# Patient Record
Sex: FEMALE | Race: WHITE | NOT HISPANIC OR LATINO | Employment: UNEMPLOYED | URBAN - NONMETROPOLITAN AREA
[De-identification: names, ages, dates, MRNs, and addresses within clinical notes are randomized per-mention and may not be internally consistent; named-entity substitution may affect disease eponyms.]

---

## 2017-02-14 ENCOUNTER — APPOINTMENT (OUTPATIENT)
Dept: LAB | Facility: HOSPITAL | Age: 42
End: 2017-02-14

## 2017-02-14 ENCOUNTER — TRANSCRIBE ORDERS (OUTPATIENT)
Dept: ADMINISTRATIVE | Facility: HOSPITAL | Age: 42
End: 2017-02-14

## 2017-02-14 DIAGNOSIS — E78.1 PURE HYPERGLYCERIDEMIA: ICD-10-CM

## 2017-02-14 DIAGNOSIS — E05.00 TOXIC DIFFUSE GOITER WITHOUT MENTION OF THYROTOXIC CRISIS OR STORM: Primary | ICD-10-CM

## 2017-02-14 DIAGNOSIS — R53.81 MALAISE AND FATIGUE: ICD-10-CM

## 2017-02-14 DIAGNOSIS — R53.83 MALAISE AND FATIGUE: ICD-10-CM

## 2017-02-14 DIAGNOSIS — E05.00 GRAVES DISEASE: ICD-10-CM

## 2017-02-14 LAB
ARTICHOKE IGE QN: 156 MG/DL (ref 0–99)
CHOLEST SERPL-MCNC: 226 MG/DL (ref 130–200)
HDLC SERPL-MCNC: 39 MG/DL
LDLC/HDLC SERPL: 4.21 {RATIO}
T3FREE SERPL-MCNC: 3.62 PG/ML (ref 2.77–5.27)
T4 FREE SERPL-MCNC: 1.18 NG/DL (ref 0.78–2.19)
TRIGL SERPL-MCNC: 114 MG/DL (ref 0–149)
TSH SERPL DL<=0.05 MIU/L-ACNC: 33.2 MIU/ML (ref 0.47–4.68)

## 2017-02-14 PROCEDURE — 84439 ASSAY OF FREE THYROXINE: CPT | Performed by: NURSE PRACTITIONER

## 2017-02-14 PROCEDURE — 36415 COLL VENOUS BLD VENIPUNCTURE: CPT | Performed by: NURSE PRACTITIONER

## 2017-02-14 PROCEDURE — 84481 FREE ASSAY (FT-3): CPT | Performed by: NURSE PRACTITIONER

## 2017-02-14 PROCEDURE — 80061 LIPID PANEL: CPT | Performed by: NURSE PRACTITIONER

## 2017-02-14 PROCEDURE — 84443 ASSAY THYROID STIM HORMONE: CPT | Performed by: NURSE PRACTITIONER

## 2017-03-14 ENCOUNTER — TRANSCRIBE ORDERS (OUTPATIENT)
Dept: ADMINISTRATIVE | Facility: HOSPITAL | Age: 42
End: 2017-03-14

## 2017-03-14 ENCOUNTER — APPOINTMENT (OUTPATIENT)
Dept: LAB | Facility: HOSPITAL | Age: 42
End: 2017-03-14

## 2017-03-14 DIAGNOSIS — R70.0 ELEVATED ERYTHROCYTE SEDIMENTATION RATE: ICD-10-CM

## 2017-03-14 DIAGNOSIS — R79.82 ELEVATED C-REACTIVE PROTEIN (CRP): Primary | ICD-10-CM

## 2017-03-14 LAB
CRP SERPL-MCNC: 1.05 MG/DL (ref 0–0.99)
ERYTHROCYTE [SEDIMENTATION RATE] IN BLOOD: 10 MM/HR (ref 0–20)

## 2017-03-14 PROCEDURE — 85651 RBC SED RATE NONAUTOMATED: CPT | Performed by: PSYCHIATRY & NEUROLOGY

## 2017-03-14 PROCEDURE — 86140 C-REACTIVE PROTEIN: CPT | Performed by: PSYCHIATRY & NEUROLOGY

## 2017-03-14 PROCEDURE — 36415 COLL VENOUS BLD VENIPUNCTURE: CPT | Performed by: PSYCHIATRY & NEUROLOGY

## 2017-04-06 ENCOUNTER — TRANSCRIBE ORDERS (OUTPATIENT)
Dept: ADMINISTRATIVE | Facility: HOSPITAL | Age: 42
End: 2017-04-06

## 2017-04-06 ENCOUNTER — HOSPITAL ENCOUNTER (OUTPATIENT)
Dept: GENERAL RADIOLOGY | Facility: HOSPITAL | Age: 42
Discharge: HOME | End: 2017-04-06
Admitting: NURSE PRACTITIONER

## 2017-04-06 DIAGNOSIS — M54.2 NECK PAIN: ICD-10-CM

## 2017-04-06 DIAGNOSIS — M54.5 LOW BACK PAIN, UNSPECIFIED BACK PAIN LATERALITY, UNSPECIFIED CHRONICITY, WITH SCIATICA PRESENCE UNSPECIFIED: ICD-10-CM

## 2017-04-06 DIAGNOSIS — M25.561 RIGHT KNEE PAIN, UNSPECIFIED CHRONICITY: ICD-10-CM

## 2017-04-06 DIAGNOSIS — M54.2 NECK PAIN: Primary | ICD-10-CM

## 2017-04-06 PROCEDURE — 73564 X-RAY EXAM KNEE 4 OR MORE: CPT

## 2017-04-06 PROCEDURE — 72050 X-RAY EXAM NECK SPINE 4/5VWS: CPT

## 2017-04-06 PROCEDURE — 72110 X-RAY EXAM L-2 SPINE 4/>VWS: CPT

## 2017-04-24 ENCOUNTER — TRANSCRIBE ORDERS (OUTPATIENT)
Dept: ADMINISTRATIVE | Facility: HOSPITAL | Age: 42
End: 2017-04-24

## 2017-04-24 ENCOUNTER — APPOINTMENT (OUTPATIENT)
Dept: LAB | Facility: HOSPITAL | Age: 42
End: 2017-04-24

## 2017-04-24 DIAGNOSIS — E55.9 UNSPECIFIED VITAMIN D DEFICIENCY: ICD-10-CM

## 2017-04-24 DIAGNOSIS — E78.1 HIGH TRIGLYCERIDES: Primary | ICD-10-CM

## 2017-04-24 DIAGNOSIS — E03.9 UNSPECIFIED HYPOTHYROIDISM: ICD-10-CM

## 2017-04-24 LAB
ALBUMIN SERPL-MCNC: 3.3 G/DL (ref 3.5–5)
ALBUMIN/GLOB SERPL: 1 G/DL (ref 1.1–2.5)
ALP SERPL-CCNC: 52 U/L (ref 24–120)
ALT SERPL W P-5'-P-CCNC: 24 U/L (ref 0–54)
ANION GAP SERPL CALCULATED.3IONS-SCNC: 8 MMOL/L (ref 4–13)
ARTICHOKE IGE QN: 96 MG/DL (ref 0–99)
AST SERPL-CCNC: 23 U/L (ref 7–45)
BASOPHILS # BLD AUTO: 0.03 10*3/MM3 (ref 0–0.2)
BASOPHILS NFR BLD AUTO: 0.3 % (ref 0–2)
BILIRUB SERPL-MCNC: 0.3 MG/DL (ref 0.1–1)
BUN BLD-MCNC: 17 MG/DL (ref 5–21)
BUN/CREAT SERPL: 21.3 (ref 7–25)
CALCIUM SPEC-SCNC: 8.5 MG/DL (ref 8.4–10.4)
CHLORIDE SERPL-SCNC: 102 MMOL/L (ref 98–110)
CHOLEST SERPL-MCNC: 158 MG/DL (ref 130–200)
CO2 SERPL-SCNC: 27 MMOL/L (ref 24–31)
CREAT BLD-MCNC: 0.8 MG/DL (ref 0.5–1.4)
DEPRECATED RDW RBC AUTO: 42.4 FL (ref 40–54)
EOSINOPHIL # BLD AUTO: 0.15 10*3/MM3 (ref 0–0.7)
EOSINOPHIL NFR BLD AUTO: 1.3 % (ref 0–4)
ERYTHROCYTE [DISTWIDTH] IN BLOOD BY AUTOMATED COUNT: 13.2 % (ref 12–15)
GFR SERPL CREATININE-BSD FRML MDRD: 79 ML/MIN/1.73
GLOBULIN UR ELPH-MCNC: 3.3 GM/DL
GLUCOSE BLD-MCNC: 87 MG/DL (ref 70–100)
HCT VFR BLD AUTO: 37.7 % (ref 37–47)
HDLC SERPL-MCNC: 38 MG/DL
HGB BLD-MCNC: 12.3 G/DL (ref 12–16)
IMM GRANULOCYTES # BLD: 0.02 10*3/MM3 (ref 0–0.03)
IMM GRANULOCYTES NFR BLD: 0.2 % (ref 0–5)
LDLC/HDLC SERPL: 2.19 {RATIO}
LYMPHOCYTES # BLD AUTO: 5.17 10*3/MM3 (ref 0.72–4.86)
LYMPHOCYTES NFR BLD AUTO: 45.9 % (ref 15–45)
MAGNESIUM SERPL-MCNC: 1.7 MG/DL (ref 1.4–2.2)
MCH RBC QN AUTO: 28.7 PG (ref 28–32)
MCHC RBC AUTO-ENTMCNC: 32.6 G/DL (ref 33–36)
MCV RBC AUTO: 87.9 FL (ref 82–98)
MONOCYTES # BLD AUTO: 1.19 10*3/MM3 (ref 0.19–1.3)
MONOCYTES NFR BLD AUTO: 10.6 % (ref 4–12)
NEUTROPHILS # BLD AUTO: 4.71 10*3/MM3 (ref 1.87–8.4)
NEUTROPHILS NFR BLD AUTO: 41.7 % (ref 39–78)
PLATELET # BLD AUTO: 212 10*3/MM3 (ref 130–400)
PMV BLD AUTO: 11 FL (ref 6–12)
POTASSIUM BLD-SCNC: 3.8 MMOL/L (ref 3.5–5.3)
PROT SERPL-MCNC: 6.6 G/DL (ref 6.3–8.7)
RBC # BLD AUTO: 4.29 10*6/MM3 (ref 4.2–5.4)
SODIUM BLD-SCNC: 137 MMOL/L (ref 135–145)
T3FREE SERPL-MCNC: 4.17 PG/ML (ref 2.77–5.27)
T4 FREE SERPL-MCNC: 2.54 NG/DL (ref 0.78–2.19)
TRIGL SERPL-MCNC: 184 MG/DL (ref 0–149)
TSH SERPL DL<=0.05 MIU/L-ACNC: 0.62 MIU/ML (ref 0.47–4.68)
WBC NRBC COR # BLD: 11.27 10*3/MM3 (ref 4.8–10.8)

## 2017-04-24 PROCEDURE — 83735 ASSAY OF MAGNESIUM: CPT | Performed by: NURSE PRACTITIONER

## 2017-04-24 PROCEDURE — 84439 ASSAY OF FREE THYROXINE: CPT | Performed by: NURSE PRACTITIONER

## 2017-04-24 PROCEDURE — 80050 GENERAL HEALTH PANEL: CPT | Performed by: NURSE PRACTITIONER

## 2017-04-24 PROCEDURE — 80061 LIPID PANEL: CPT | Performed by: NURSE PRACTITIONER

## 2017-04-24 PROCEDURE — 84481 FREE ASSAY (FT-3): CPT | Performed by: NURSE PRACTITIONER

## 2017-04-24 PROCEDURE — 36415 COLL VENOUS BLD VENIPUNCTURE: CPT | Performed by: NURSE PRACTITIONER

## 2017-06-27 ENCOUNTER — APPOINTMENT (OUTPATIENT)
Dept: LAB | Facility: HOSPITAL | Age: 42
End: 2017-06-27

## 2017-06-27 ENCOUNTER — TRANSCRIBE ORDERS (OUTPATIENT)
Dept: LAB | Facility: HOSPITAL | Age: 42
End: 2017-06-27

## 2017-06-27 DIAGNOSIS — E55.9 VITAMIN D DEFICIENCY, UNSPECIFIED: ICD-10-CM

## 2017-06-27 DIAGNOSIS — E03.9 UNSPECIFIED HYPOTHYROIDISM: Primary | ICD-10-CM

## 2017-06-27 DIAGNOSIS — E78.1 PURE HYPERGLYCERIDEMIA: ICD-10-CM

## 2017-06-27 LAB
ALBUMIN SERPL-MCNC: 4.2 G/DL (ref 3.5–5)
ALBUMIN/GLOB SERPL: 1.3 G/DL (ref 1.1–2.5)
ALP SERPL-CCNC: 70 U/L (ref 24–120)
ALT SERPL W P-5'-P-CCNC: 34 U/L (ref 0–54)
ANION GAP SERPL CALCULATED.3IONS-SCNC: 10 MMOL/L (ref 4–13)
ARTICHOKE IGE QN: 103 MG/DL (ref 0–99)
AST SERPL-CCNC: 18 U/L (ref 7–45)
BASOPHILS # BLD AUTO: 0.04 10*3/MM3 (ref 0–0.2)
BASOPHILS NFR BLD AUTO: 0.5 % (ref 0–2)
BILIRUB SERPL-MCNC: 0.4 MG/DL (ref 0.1–1)
BUN BLD-MCNC: 15 MG/DL (ref 5–21)
BUN/CREAT SERPL: 18.8 (ref 7–25)
CALCIUM SPEC-SCNC: 9.1 MG/DL (ref 8.4–10.4)
CHLORIDE SERPL-SCNC: 105 MMOL/L (ref 98–110)
CHOLEST SERPL-MCNC: 192 MG/DL (ref 130–200)
CO2 SERPL-SCNC: 23 MMOL/L (ref 24–31)
CREAT BLD-MCNC: 0.8 MG/DL (ref 0.5–1.4)
DEPRECATED RDW RBC AUTO: 42.2 FL (ref 40–54)
EOSINOPHIL # BLD AUTO: 0.1 10*3/MM3 (ref 0–0.7)
EOSINOPHIL NFR BLD AUTO: 1.2 % (ref 0–4)
ERYTHROCYTE [DISTWIDTH] IN BLOOD BY AUTOMATED COUNT: 13.2 % (ref 12–15)
GFR SERPL CREATININE-BSD FRML MDRD: 79 ML/MIN/1.73
GLOBULIN UR ELPH-MCNC: 3.3 GM/DL
GLUCOSE BLD-MCNC: 93 MG/DL (ref 70–100)
HCT VFR BLD AUTO: 41.5 % (ref 37–47)
HDLC SERPL-MCNC: 33 MG/DL
HGB BLD-MCNC: 13.9 G/DL (ref 12–16)
IMM GRANULOCYTES # BLD: 0.01 10*3/MM3 (ref 0–0.03)
IMM GRANULOCYTES NFR BLD: 0.1 % (ref 0–5)
LDLC/HDLC SERPL: 3.31 {RATIO}
LYMPHOCYTES # BLD AUTO: 3.26 10*3/MM3 (ref 0.72–4.86)
LYMPHOCYTES NFR BLD AUTO: 37.8 % (ref 15–45)
MCH RBC QN AUTO: 29.2 PG (ref 28–32)
MCHC RBC AUTO-ENTMCNC: 33.5 G/DL (ref 33–36)
MCV RBC AUTO: 87.2 FL (ref 82–98)
MONOCYTES # BLD AUTO: 0.96 10*3/MM3 (ref 0.19–1.3)
MONOCYTES NFR BLD AUTO: 11.1 % (ref 4–12)
NEUTROPHILS # BLD AUTO: 4.26 10*3/MM3 (ref 1.87–8.4)
NEUTROPHILS NFR BLD AUTO: 49.3 % (ref 39–78)
PLATELET # BLD AUTO: 237 10*3/MM3 (ref 130–400)
PMV BLD AUTO: 11.3 FL (ref 6–12)
POTASSIUM BLD-SCNC: 3.9 MMOL/L (ref 3.5–5.3)
PROT SERPL-MCNC: 7.5 G/DL (ref 6.3–8.7)
RBC # BLD AUTO: 4.76 10*6/MM3 (ref 4.2–5.4)
SODIUM BLD-SCNC: 138 MMOL/L (ref 135–145)
T3FREE SERPL-MCNC: 3.8 PG/ML (ref 2.77–5.27)
T4 FREE SERPL-MCNC: 1.61 NG/DL (ref 0.78–2.19)
TRIGL SERPL-MCNC: 249 MG/DL (ref 0–149)
TSH SERPL DL<=0.05 MIU/L-ACNC: 1.13 MIU/ML (ref 0.47–4.68)
WBC NRBC COR # BLD: 8.63 10*3/MM3 (ref 4.8–10.8)

## 2017-06-27 PROCEDURE — 80061 LIPID PANEL: CPT | Performed by: NURSE PRACTITIONER

## 2017-06-27 PROCEDURE — 84439 ASSAY OF FREE THYROXINE: CPT | Performed by: NURSE PRACTITIONER

## 2017-06-27 PROCEDURE — 84481 FREE ASSAY (FT-3): CPT | Performed by: NURSE PRACTITIONER

## 2017-06-27 PROCEDURE — 82652 VIT D 1 25-DIHYDROXY: CPT | Performed by: NURSE PRACTITIONER

## 2017-06-27 PROCEDURE — 36415 COLL VENOUS BLD VENIPUNCTURE: CPT | Performed by: NURSE PRACTITIONER

## 2017-06-27 PROCEDURE — 80050 GENERAL HEALTH PANEL: CPT | Performed by: NURSE PRACTITIONER

## 2017-06-29 LAB — 1,25(OH)2D3 SERPL-MCNC: 35.6 PG/ML (ref 19.9–79.3)

## 2017-08-29 ENCOUNTER — HOSPITAL ENCOUNTER (OUTPATIENT)
Dept: GENERAL RADIOLOGY | Age: 42
Discharge: HOME | End: 2017-08-29
Payer: MEDICAID

## 2017-08-29 ENCOUNTER — HOSPITAL ENCOUNTER (OUTPATIENT)
Age: 42
Setting detail: OUTPATIENT SURGERY
Discharge: HOME | End: 2017-08-29
Attending: PHYSICAL MEDICINE & REHABILITATION | Admitting: PHYSICAL MEDICINE & REHABILITATION

## 2017-08-29 VITALS
BODY MASS INDEX: 32.14 KG/M2 | RESPIRATION RATE: 18 BRPM | SYSTOLIC BLOOD PRESSURE: 110 MMHG | HEIGHT: 66 IN | WEIGHT: 200 LBS | OXYGEN SATURATION: 96 % | HEART RATE: 83 BPM | DIASTOLIC BLOOD PRESSURE: 77 MMHG

## 2017-08-29 DIAGNOSIS — R52 PAIN: ICD-10-CM

## 2017-08-29 PROCEDURE — G0260 INJ FOR SACROILIAC JT ANESTH: HCPCS

## 2017-08-29 PROCEDURE — G8918 PT W/O PREOP ORDER IV AB PRO: HCPCS

## 2017-08-29 PROCEDURE — G8907 PT DOC NO EVENTS ON DISCHARG: HCPCS

## 2017-08-29 PROCEDURE — 3209999900 FLUORO FOR SURGICAL PROCEDURES

## 2017-08-29 RX ORDER — 0.9 % SODIUM CHLORIDE 0.9 %
VIAL (ML) INJECTION PRN
Status: DISCONTINUED | OUTPATIENT
Start: 2017-08-29 | End: 2017-08-29 | Stop reason: HOSPADM

## 2017-08-29 RX ORDER — LIDOCAINE HYDROCHLORIDE 10 MG/ML
INJECTION, SOLUTION EPIDURAL; INFILTRATION; INTRACAUDAL; PERINEURAL PRN
Status: DISCONTINUED | OUTPATIENT
Start: 2017-08-29 | End: 2017-08-29 | Stop reason: HOSPADM

## 2017-09-19 ENCOUNTER — OFFICE VISIT (OUTPATIENT)
Dept: OBSTETRICS AND GYNECOLOGY | Facility: CLINIC | Age: 42
End: 2017-09-19

## 2017-09-19 VITALS
HEIGHT: 66 IN | DIASTOLIC BLOOD PRESSURE: 70 MMHG | SYSTOLIC BLOOD PRESSURE: 110 MMHG | WEIGHT: 194 LBS | BODY MASS INDEX: 31.18 KG/M2

## 2017-09-19 DIAGNOSIS — Z01.419 VISIT FOR GYNECOLOGIC EXAMINATION: Primary | ICD-10-CM

## 2017-09-19 DIAGNOSIS — F17.200 SMOKER: ICD-10-CM

## 2017-09-19 DIAGNOSIS — F32.A ANXIETY AND DEPRESSION: ICD-10-CM

## 2017-09-19 DIAGNOSIS — Z12.39 SCREENING FOR BREAST CANCER: ICD-10-CM

## 2017-09-19 DIAGNOSIS — F41.9 ANXIETY AND DEPRESSION: ICD-10-CM

## 2017-09-19 PROCEDURE — 99406 BEHAV CHNG SMOKING 3-10 MIN: CPT | Performed by: NURSE PRACTITIONER

## 2017-09-19 PROCEDURE — 99396 PREV VISIT EST AGE 40-64: CPT | Performed by: NURSE PRACTITIONER

## 2017-09-19 PROCEDURE — G0123 SCREEN CERV/VAG THIN LAYER: HCPCS | Performed by: NURSE PRACTITIONER

## 2017-09-19 RX ORDER — DULOXETIN HYDROCHLORIDE 60 MG/1
60 CAPSULE, DELAYED RELEASE ORAL 2 TIMES DAILY
COMMUNITY
End: 2019-02-20

## 2017-09-19 RX ORDER — IBUPROFEN 600 MG/1
800 TABLET ORAL NIGHTLY
COMMUNITY
End: 2020-06-29

## 2017-09-19 RX ORDER — ARIPIPRAZOLE 5 MG/1
1 TABLET ORAL DAILY
COMMUNITY
Start: 2017-09-15 | End: 2019-02-20

## 2017-09-19 RX ORDER — LEVOTHYROXINE SODIUM 0.2 MG/1
1 TABLET ORAL DAILY
COMMUNITY
Start: 2017-09-16 | End: 2020-06-29 | Stop reason: DRUGHIGH

## 2017-09-19 RX ORDER — DEXTROAMPHETAMINE SACCHARATE, AMPHETAMINE ASPARTATE, DEXTROAMPHETAMINE SULFATE AND AMPHETAMINE SULFATE 5; 5; 5; 5 MG/1; MG/1; MG/1; MG/1
5 TABLET ORAL
COMMUNITY
End: 2019-02-20

## 2017-09-19 RX ORDER — TIZANIDINE 4 MG/1
4 TABLET ORAL AS NEEDED
COMMUNITY
End: 2017-10-20

## 2017-09-19 NOTE — PATIENT INSTRUCTIONS
Exercising to Lose Weight  Exercising can help you to lose weight. In order to lose weight through exercise, you need to do vigorous-intensity exercise. You can tell that you are exercising with vigorous intensity if you are breathing very hard and fast and cannot hold a conversation while exercising.  Moderate-intensity exercise helps to maintain your current weight. You can tell that you are exercising at a moderate level if you have a higher heart rate and faster breathing, but you are still able to hold a conversation.  HOW OFTEN SHOULD I EXERCISE?  Choose an activity that you enjoy and set realistic goals. Your health care provider can help you to make an activity plan that works for you. Exercise regularly as directed by your health care provider. This may include:  · Doing resistance training twice each week, such as:    Push-ups.    Sit-ups.    Lifting weights.    Using resistance bands.  · Doing a given intensity of exercise for a given amount of time. Choose from these options:    150 minutes of moderate-intensity exercise every week.    75 minutes of vigorous-intensity exercise every week.    A mix of moderate-intensity and vigorous-intensity exercise every week.  Children, pregnant women, people who are out of shape, people who are overweight, and older adults may need to consult a health care provider for individual recommendations. If you have any sort of medical condition, be sure to consult your health care provider before starting a new exercise program.  WHAT ARE SOME ACTIVITIES THAT CAN HELP ME TO LOSE WEIGHT?   · Walking at a rate of at least 4.5 miles an hour.  · Jogging or running at a rate of 5 miles per hour.  · Biking at a rate of at least 10 miles per hour.  · Lap swimming.  · Roller-skating or in-line skating.  · Cross-country skiing.  · Vigorous competitive sports, such as football, basketball, and soccer.  · Jumping rope.  · Aerobic dancing.  HOW CAN I BE MORE ACTIVE IN MY DAY-TO-DAY  ACTIVITIES?  · Use the stairs instead of the elevator.  · Take a walk during your lunch break.  · If you drive, park your car farther away from work or school.  · If you take public transportation, get off one stop early and walk the rest of the way.  · Make all of your phone calls while standing up and walking around.  · Get up, stretch, and walk around every 30 minutes throughout the day.  WHAT GUIDELINES SHOULD I FOLLOW WHILE EXERCISING?  · Do not exercise so much that you hurt yourself, feel dizzy, or get very short of breath.  · Consult your health care provider prior to starting a new exercise program.  · Wear comfortable clothes and shoes with good support.  · Drink plenty of water while you exercise to prevent dehydration or heat stroke. Body water is lost during exercise and must be replaced.  · Work out until you breathe faster and your heart beats faster.     This information is not intended to replace advice given to you by your health care provider. Make sure you discuss any questions you have with your health care provider.     Document Released: 01/20/2012 Document Revised: 01/08/2016 Document Reviewed: 05/21/2015  [employer] Interactive Patient Education ©2017 [employer]    Calorie Counting for Weight Loss  Calories are energy you get from the things you eat and drink. Your body uses this energy to keep you going throughout the day. The number of calories you eat affects your weight. When you eat more calories than your body needs, your body stores the extra calories as fat. When you eat fewer calories than your body needs, your body burns fat to get the energy it needs.  Calorie counting means keeping track of how many calories you eat and drink each day. If you make sure to eat fewer calories than your body needs, you should lose weight. In order for calorie counting to work, you will need to eat the number of calories that are right for you in a day to lose a healthy amount of weight per week. A  healthy amount of weight to lose per week is usually 1-2 lb (0.5-0.9 kg). A dietitian can determine how many calories you need in a day and give you suggestions on how to reach your calorie goal.   WHAT IS MY MY PLAN?  My goal is to have __________ calories per day.   If I have this many calories per day, I should lose around __________ pounds per week.  WHAT DO I NEED TO KNOW ABOUT CALORIE COUNTING?  In order to meet your daily calorie goal, you will need to:  · Find out how many calories are in each food you would like to eat. Try to do this before you eat.  · Decide how much of the food you can eat.  · Write down what you ate and how many calories it had. Doing this is called keeping a food log.  WHERE DO I FIND CALORIE INFORMATION?  The number of calories in a food can be found on a Nutrition Facts label. Note that all the information on a label is based on a specific serving of the food. If a food does not have a Nutrition Facts label, try to look up the calories online or ask your dietitian for help.  HOW DO I DECIDE HOW MUCH TO EAT?  To decide how much of the food you can eat, you will need to consider both the number of calories in one serving and the size of one serving. This information can be found on the Nutrition Facts label. If a food does not have a Nutrition Facts label, look up the information online or ask your dietitian for help.  Remember that calories are listed per serving. If you choose to have more than one serving of a food, you will have to multiply the calories per serving by the amount of servings you plan to eat. For example, the label on a package of bread might say that a serving size is 1 slice and that there are 90 calories in a serving. If you eat 1 slice, you will have eaten 90 calories. If you eat 2 slices, you will have eaten 180 calories.  HOW DO I KEEP A FOOD LOG?  After each meal, record the following information in your food log:  · What you ate.  · How much of it you  ate.  · How many calories it had.  · Then, add up your calories.  Keep your food log near you, such as in a small notebook in your pocket. Another option is to use a mobile josh or website. Some programs will calculate calories for you and show you how many calories you have left each time you add an item to the log.  WHAT ARE SOME CALORIE COUNTING TIPS?  · Use your calories on foods and drinks that will fill you up and not leave you hungry. Some examples of this include foods like nuts and nut butters, vegetables, lean proteins, and high-fiber foods (more than 5 g fiber per serving).  · Eat nutritious foods and avoid empty calories. Empty calories are calories you get from foods or beverages that do not have many nutrients, such as candy and soda. It is better to have a nutritious high-calorie food (such as an avocado) than a food with few nutrients (such as a bag of chips).  · Know how many calories are in the foods you eat most often. This way, you do not have to look up how many calories they have each time you eat them.  · Look out for foods that may seem like low-calorie foods but are really high-calorie foods, such as baked goods, soda, and fat-free candy.  · Pay attention to calories in drinks. Drinks such as sodas, specialty coffee drinks, alcohol, and juices have a lot of calories yet do not fill you up. Choose low-calorie drinks like water and diet drinks.  · Focus your calorie counting efforts on higher calorie items. Logging the calories in a garden salad that contains only vegetables is less important than calculating the calories in a milk shake.  · Find a way of tracking calories that works for you. Get creative. Most people who are successful find ways to keep track of how much they eat in a day, even if they do not count every calorie.  WHAT ARE SOME PORTION CONTROL TIPS?  · Know how many calories are in a serving. This will help you know how many servings of a certain food you can have.  · Use a  measuring cup to measure serving sizes. This is helpful when you start out. With time, you will be able to estimate serving sizes for some foods.  · Take some time to put servings of different foods on your favorite plates, bowls, and cups so you know what a serving looks like.  · Try not to eat straight from a bag or box. Doing this can lead to overeating. Put the amount you would like to eat in a cup or on a plate to make sure you are eating the right portion.  · Use smaller plates, glasses, and bowls to prevent overeating. This is a quick and easy way to practice portion control. If your plate is smaller, less food can fit on it.  · Try not to multitask while eating, such as watching TV or using your computer. If it is time to eat, sit down at a table and enjoy your food. Doing this will help you to start recognizing when you are full. It will also make you more aware of what and how much you are eating.  HOW CAN I CALORIE COUNT WHEN EATING OUT?  · Ask for smaller portion sizes or child-sized portions.  · Consider sharing an entree and sides instead of getting your own entree.  · If you get your own entree, eat only half. Ask for a box at the beginning of your meal and put the rest of your entree in it so you are not tempted to eat it.  · Look for the calories on the menu. If calories are listed, choose the lower calorie options.  · Choose dishes that include vegetables, fruits, whole grains, low-fat dairy products, and lean protein. Focusing on smart food choices from each of the 5 food groups can help you stay on track at restaurants.  · Choose items that are boiled, broiled, grilled, or steamed.  · Choose water, milk, unsweetened iced tea, or other drinks without added sugars. If you want an alcoholic beverage, choose a lower calorie option. For example, a regular austin can have up to 700 calories and a glass of wine has around 150.  · Stay away from items that are buttered, battered, fried, or served with  "cream sauce. Items labeled \"crispy\" are usually fried, unless stated otherwise.  · Ask for dressings, sauces, and syrups on the side. These are usually very high in calories, so do not eat much of them.  · Watch out for salads. Many people think salads are a healthy option, but this is often not the case. Many salads come with santana, fried chicken, lots of cheese, fried chips, and dressing. All of these items have a lot of calories. If you want a salad, choose a garden salad and ask for grilled meats or steak. Ask for the dressing on the side, or ask for olive oil and vinegar or lemon to use as dressing.  · Estimate how many servings of a food you are given. For example, a serving of cooked rice is ½ cup or about the size of half a tennis ball or one cupcake wrapper. Knowing serving sizes will help you be aware of how much food you are eating at restaurants. The list below tells you how big or small some common portion sizes are based on everyday objects.    1 oz--4 stacked dice.    3 oz--1 deck of cards.    1 tsp--1 dice.    1 Tbsp--½ a Ping-Pong ball.    2 Tbsp--1 Ping-Pong ball.    ½ cup--1 tennis ball or 1 cupcake wrapper.    1 cup--1 baseball.     This information is not intended to replace advice given to you by your health care provider. Make sure you discuss any questions you have with your health care provider.     Document Released: 12/18/2006 Document Revised: 01/08/2016 Document Reviewed: 10/23/2014  [employer] Interactive Patient Education ©2017 [employer]    "

## 2017-09-19 NOTE — PROGRESS NOTES
Subjective   Kimberly Morgan is a 42 y.o. female is here today as a self referral.    HPI Comments: I'm here for pap and physical. I also need orders for a mammogram.     Gynecologic Exam   The patient's pertinent negatives include no pelvic pain or vaginal discharge. Pertinent negatives include no abdominal pain, back pain, chills, constipation, diarrhea, flank pain, headaches, nausea, rash, sore throat or vomiting.       The following portions of the patient's history were reviewed and updated as appropriate: allergies, current medications, past family history, past social history, past surgical history and problem list.    Review of Systems   Constitutional: Positive for unexpected weight change (weight gain 65 lbs in a year). Negative for activity change, appetite change, chills and fatigue.   HENT: Negative for congestion, dental problem, ear pain, hearing loss, nosebleeds, rhinorrhea, sneezing, sore throat and voice change.    Eyes: Negative for discharge, redness, itching and visual disturbance.   Respiratory: Negative for apnea, cough, choking, shortness of breath and wheezing.    Cardiovascular: Negative for chest pain and palpitations.   Gastrointestinal: Negative for abdominal distention, abdominal pain, constipation, diarrhea, nausea and vomiting.   Endocrine: Negative for cold intolerance, heat intolerance and polyuria.   Genitourinary: Negative for difficulty urinating, dyspareunia, flank pain, genital sores, menstrual problem, pelvic pain, vaginal bleeding and vaginal discharge.   Musculoskeletal: Negative for arthralgias, back pain, myalgias and neck pain.   Skin: Negative for pallor and rash.   Allergic/Immunologic: Negative for environmental allergies and food allergies.   Neurological: Negative for dizziness, tremors, seizures, numbness and headaches.   Hematological: Negative for adenopathy. Does not bruise/bleed easily.   Psychiatric/Behavioral: Negative for agitation, decreased concentration,  sleep disturbance and suicidal ideas. The patient is not nervous/anxious.        Objective   Physical Exam   Constitutional: She is oriented to person, place, and time. She appears well-developed and well-nourished. No distress.   HENT:   Head: Normocephalic and atraumatic.   Right Ear: External ear normal.   Left Ear: External ear normal.   Nose: Nose normal.   Mouth/Throat: Oropharynx is clear and moist.   Eyes: EOM are normal. Pupils are equal, round, and reactive to light.   Neck: Normal range of motion. No thyromegaly present.   Cardiovascular: Normal rate, regular rhythm and normal heart sounds.    No murmur heard.  Pulmonary/Chest: Effort normal and breath sounds normal. No respiratory distress. She has no wheezes. Right breast exhibits no inverted nipple and no mass. Left breast exhibits no inverted nipple and no mass.   Abdominal: Soft. Bowel sounds are normal. There is no tenderness.   Genitourinary: Vagina normal and uterus normal. No breast tenderness. Pelvic exam was performed with patient supine. There is no rash or tenderness on the right labia. There is no rash or tenderness on the left labia. Cervix exhibits no motion tenderness. Right adnexum displays no mass and no tenderness. Left adnexum displays no mass and no tenderness. No bleeding in the vagina. No vaginal discharge found.   Genitourinary Comments: Bilateral tubes surgically removed  Urethra and urethral meatus normal  Bladder normal no prolapse  Perineum and rectum examined and intact without lesions   Musculoskeletal: Normal range of motion.   Lymphadenopathy:        Right: No inguinal adenopathy present.        Left: No inguinal adenopathy present.   Neurological: She is alert and oriented to person, place, and time. She has normal reflexes.   Skin: Skin is warm and dry.   Psychiatric: She has a normal mood and affect. Her behavior is normal. Judgment and thought content normal.   Nursing note and vitals reviewed.        Assessment/Plan    Kimberly was seen today for gynecologic exam.    Diagnoses and all orders for this visit:    Visit for gynecologic examination    Normal gyn exam. Betsey BURNS is her PCP. Laina BURNS follows her for her fibromyalgia.   -     Liquid-based Pap Smear, Screening    Screening for breast cancer  -     Mammo Screening Digital Tomosynthesis Bilateral With CAD; Future    Smoker   Pt states she has cut back on smoking but not quit yet.  The patient is counseled regarding smoking cessation. She is given information on the consequences of smoking and her health. She is instructed about the smoking cessation class offered by [facility]. We discussed this face to face for 3-5 minutes and she is given a handout regarding the class at [facility].    Anxiety and depression   [facility] manages her depression and anxiety    BMI 31.0-31.9,adult   Pt states she has gained 65 lbs in 1 year. She states she walks 3 times weekly and has started to eat gluten free for the second week now. Encouraged her to continue.

## 2017-09-22 LAB
GEN CATEG CVX/VAG CYTO-IMP: ABNORMAL
LAB AP CASE REPORT: ABNORMAL
LAB AP GYN ADDITIONAL INFORMATION: ABNORMAL
LAB AP GYN OTHER FINDINGS: ABNORMAL
Lab: ABNORMAL
PATH INTERP SPEC-IMP: ABNORMAL
STAT OF ADQ CVX/VAG CYTO-IMP: ABNORMAL

## 2017-09-25 ENCOUNTER — TELEPHONE (OUTPATIENT)
Dept: OBSTETRICS AND GYNECOLOGY | Facility: CLINIC | Age: 42
End: 2017-09-25

## 2017-09-28 ENCOUNTER — APPOINTMENT (OUTPATIENT)
Dept: MAMMOGRAPHY | Facility: HOSPITAL | Age: 42
End: 2017-09-28

## 2017-10-20 ENCOUNTER — HOSPITAL ENCOUNTER (OUTPATIENT)
Dept: MAMMOGRAPHY | Facility: HOSPITAL | Age: 42
Discharge: HOME | End: 2017-10-20
Admitting: NURSE PRACTITIONER

## 2017-10-20 ENCOUNTER — OFFICE VISIT (OUTPATIENT)
Dept: OBSTETRICS AND GYNECOLOGY | Facility: CLINIC | Age: 42
End: 2017-10-20

## 2017-10-20 VITALS
WEIGHT: 193 LBS | DIASTOLIC BLOOD PRESSURE: 82 MMHG | SYSTOLIC BLOOD PRESSURE: 128 MMHG | BODY MASS INDEX: 31.02 KG/M2 | HEIGHT: 66 IN

## 2017-10-20 DIAGNOSIS — N87.0 MILD DYSPLASIA OF CERVIX: Primary | ICD-10-CM

## 2017-10-20 DIAGNOSIS — Z12.39 SCREENING FOR BREAST CANCER: ICD-10-CM

## 2017-10-20 DIAGNOSIS — Z78.9 NON-SMOKER: ICD-10-CM

## 2017-10-20 LAB
B-HCG UR QL: NEGATIVE
INTERNAL NEGATIVE CONTROL: NEGATIVE
INTERNAL POSITIVE CONTROL: NEGATIVE
Lab: NORMAL

## 2017-10-20 PROCEDURE — 57455 BIOPSY OF CERVIX W/SCOPE: CPT | Performed by: OBSTETRICS & GYNECOLOGY

## 2017-10-20 PROCEDURE — 77063 BREAST TOMOSYNTHESIS BI: CPT

## 2017-10-20 PROCEDURE — 88305 TISSUE EXAM BY PATHOLOGIST: CPT | Performed by: OBSTETRICS & GYNECOLOGY

## 2017-10-20 PROCEDURE — G0202 SCR MAMMO BI INCL CAD: HCPCS

## 2017-10-20 RX ORDER — DEXTROAMPHETAMINE SACCHARATE, AMPHETAMINE ASPARTATE, DEXTROAMPHETAMINE SULFATE AND AMPHETAMINE SULFATE 2.5; 2.5; 2.5; 2.5 MG/1; MG/1; MG/1; MG/1
TABLET ORAL
COMMUNITY
Start: 2017-09-28 | End: 2017-10-20 | Stop reason: DRUGHIGH

## 2017-10-20 NOTE — PROGRESS NOTES
Colposcopy    Date of procedure:  10/20/2017    Risks and benefits discussed? yes  All questions answered? yes  Consents given by the patient  Written consent obtained? yes    Local anesthesia used:  Hurricaine spray    Pre-op indication: LGSIL - mild dysplasia  Procedure documentation:    The cervix was initially viewed colposcopically through a green filter.  The cervix was next bathed in acetic acid.   The findings were as follows:      The transformation zone was able to be seen adequately.    Acetowhite noted at 12 o'clock    Ectocervical biopsies were taken from  12 o'clock hemostasis achieved with  silver nitrate    An ECC was not performed.      Colposcopic Impression: 1. Adequate colposcopy  2. JAH I  3. 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  Specimens labelled and sent to pathology.      This note was electronically signed.    Ji Calles MD  October 20, 2017

## 2017-10-23 LAB
CYTO UR: NORMAL
LAB AP CASE REPORT: NORMAL
LAB AP CLINICAL INFORMATION: NORMAL
Lab: NORMAL
PATH REPORT.FINAL DX SPEC: NORMAL
PATH REPORT.GROSS SPEC: NORMAL

## 2017-10-24 ENCOUNTER — TELEPHONE (OUTPATIENT)
Dept: OBSTETRICS AND GYNECOLOGY | Facility: CLINIC | Age: 42
End: 2017-10-24

## 2017-10-24 NOTE — TELEPHONE ENCOUNTER
Spoke with pt. She was given pathology results that are the same as her pap smear results therefore all she needs to do is repeat her pap smear in one year.

## 2018-03-29 ENCOUNTER — TRANSCRIBE ORDERS (OUTPATIENT)
Dept: ADMINISTRATIVE | Facility: HOSPITAL | Age: 43
End: 2018-03-29

## 2018-03-29 DIAGNOSIS — M51.36 DEGENERATION, INTERVERTEBRAL DISC, LUMBAR: Primary | ICD-10-CM

## 2018-03-29 DIAGNOSIS — M51.37 DEGENERATION OF LUMBAR OR LUMBOSACRAL INTERVERTEBRAL DISC: ICD-10-CM

## 2018-04-12 ENCOUNTER — APPOINTMENT (OUTPATIENT)
Dept: MRI IMAGING | Facility: HOSPITAL | Age: 43
End: 2018-04-12
Attending: PAIN MEDICINE

## 2018-04-24 ENCOUNTER — HOSPITAL ENCOUNTER (OUTPATIENT)
Dept: MRI IMAGING | Facility: HOSPITAL | Age: 43
Discharge: HOME | End: 2018-04-24
Attending: PAIN MEDICINE | Admitting: PAIN MEDICINE

## 2018-04-24 ENCOUNTER — HOSPITAL ENCOUNTER (OUTPATIENT)
Dept: GENERAL RADIOLOGY | Facility: HOSPITAL | Age: 43
Discharge: HOME | End: 2018-04-24
Attending: PAIN MEDICINE

## 2018-04-24 ENCOUNTER — TRANSCRIBE ORDERS (OUTPATIENT)
Dept: ADMINISTRATIVE | Facility: HOSPITAL | Age: 43
End: 2018-04-24

## 2018-04-24 DIAGNOSIS — M51.36 DISC DEGENERATION, LUMBAR: Primary | ICD-10-CM

## 2018-04-24 DIAGNOSIS — M51.37 DEGENERATION OF LUMBAR OR LUMBOSACRAL INTERVERTEBRAL DISC: ICD-10-CM

## 2018-04-24 DIAGNOSIS — M51.36 DEGENERATION, INTERVERTEBRAL DISC, LUMBAR: ICD-10-CM

## 2018-04-24 PROCEDURE — 72110 X-RAY EXAM L-2 SPINE 4/>VWS: CPT

## 2018-04-24 PROCEDURE — 72148 MRI LUMBAR SPINE W/O DYE: CPT

## 2018-07-03 ENCOUNTER — TRANSCRIBE ORDERS (OUTPATIENT)
Dept: LAB | Facility: HOSPITAL | Age: 43
End: 2018-07-03

## 2018-07-03 ENCOUNTER — APPOINTMENT (OUTPATIENT)
Dept: LAB | Facility: HOSPITAL | Age: 43
End: 2018-07-03

## 2018-07-03 DIAGNOSIS — R53.83 MALAISE AND FATIGUE: Primary | ICD-10-CM

## 2018-07-03 DIAGNOSIS — R53.81 MALAISE AND FATIGUE: Primary | ICD-10-CM

## 2018-07-03 LAB
T3FREE SERPL-MCNC: 5.67 PG/ML (ref 2.77–5.27)
T4 FREE SERPL-MCNC: 2.61 NG/DL (ref 0.78–2.19)
TSH SERPL DL<=0.05 MIU/L-ACNC: 0.04 MIU/ML (ref 0.47–4.68)

## 2018-07-03 PROCEDURE — 84439 ASSAY OF FREE THYROXINE: CPT | Performed by: OTOLARYNGOLOGY

## 2018-07-03 PROCEDURE — 36415 COLL VENOUS BLD VENIPUNCTURE: CPT

## 2018-07-03 PROCEDURE — 84443 ASSAY THYROID STIM HORMONE: CPT | Performed by: OTOLARYNGOLOGY

## 2018-07-03 PROCEDURE — 84481 FREE ASSAY (FT-3): CPT | Performed by: OTOLARYNGOLOGY

## 2018-09-30 ENCOUNTER — OFFICE VISIT (OUTPATIENT)
Dept: URGENT CARE | Age: 43
End: 2018-09-30
Payer: MEDICAID

## 2018-09-30 VITALS
BODY MASS INDEX: 31.99 KG/M2 | HEIGHT: 65 IN | HEART RATE: 73 BPM | WEIGHT: 192 LBS | SYSTOLIC BLOOD PRESSURE: 102 MMHG | RESPIRATION RATE: 16 BRPM | OXYGEN SATURATION: 97 % | TEMPERATURE: 98.2 F | DIASTOLIC BLOOD PRESSURE: 67 MMHG

## 2018-09-30 DIAGNOSIS — K08.89 TOOTH ACHE: Primary | ICD-10-CM

## 2018-09-30 PROCEDURE — 4004F PT TOBACCO SCREEN RCVD TLK: CPT | Performed by: NURSE PRACTITIONER

## 2018-09-30 PROCEDURE — G8417 CALC BMI ABV UP PARAM F/U: HCPCS | Performed by: NURSE PRACTITIONER

## 2018-09-30 PROCEDURE — G8427 DOCREV CUR MEDS BY ELIG CLIN: HCPCS | Performed by: NURSE PRACTITIONER

## 2018-09-30 PROCEDURE — 99213 OFFICE O/P EST LOW 20 MIN: CPT | Performed by: NURSE PRACTITIONER

## 2018-09-30 RX ORDER — IBUPROFEN 200 MG
200 TABLET ORAL EVERY 6 HOURS PRN
COMMUNITY
End: 2020-08-18 | Stop reason: ALTCHOICE

## 2018-09-30 RX ORDER — AMOXICILLIN AND CLAVULANATE POTASSIUM 875; 125 MG/1; MG/1
1 TABLET, FILM COATED ORAL 2 TIMES DAILY
Qty: 20 TABLET | Refills: 0 | Status: SHIPPED | OUTPATIENT
Start: 2018-09-30 | End: 2018-10-10

## 2018-09-30 RX ORDER — LEVOTHYROXINE SODIUM 175 UG/1
TABLET ORAL
Refills: 6 | COMMUNITY
Start: 2018-09-10 | End: 2020-08-27 | Stop reason: ALTCHOICE

## 2018-09-30 RX ORDER — LEVOTHYROXINE SODIUM 0.2 MG/1
1 TABLET ORAL
COMMUNITY
Start: 2017-09-16 | End: 2018-09-30 | Stop reason: DRUGHIGH

## 2018-09-30 ASSESSMENT — ENCOUNTER SYMPTOMS
BLOOD IN STOOL: 0
EYE DISCHARGE: 0
CONSTIPATION: 0
SHORTNESS OF BREATH: 0
WHEEZING: 0
NAUSEA: 0
COUGH: 0
ABDOMINAL PAIN: 0
VOMITING: 0
EYE PAIN: 0
DIARRHEA: 0
SORE THROAT: 0
PHOTOPHOBIA: 0

## 2019-01-03 ENCOUNTER — HOSPITAL ENCOUNTER (OUTPATIENT)
Dept: PHYSICAL THERAPY | Facility: HOSPITAL | Age: 44
Discharge: HOME | End: 2019-01-03

## 2019-01-03 PROCEDURE — 97799 UNLISTED PHYSCL MED/REHAB PX: CPT

## 2019-02-19 DIAGNOSIS — E05.00 GRAVES' DISEASE: ICD-10-CM

## 2019-02-19 DIAGNOSIS — Z13.6 SCREENING, HEART DISEASE, ISCHEMIC: Primary | ICD-10-CM

## 2019-02-20 ENCOUNTER — OFFICE VISIT (OUTPATIENT)
Dept: FAMILY MEDICINE CLINIC | Facility: CLINIC | Age: 44
End: 2019-02-20

## 2019-02-20 ENCOUNTER — RESULTS ENCOUNTER (OUTPATIENT)
Dept: FAMILY MEDICINE CLINIC | Facility: CLINIC | Age: 44
End: 2019-02-20

## 2019-02-20 VITALS
TEMPERATURE: 98.6 F | RESPIRATION RATE: 18 BRPM | HEIGHT: 66 IN | HEART RATE: 93 BPM | DIASTOLIC BLOOD PRESSURE: 64 MMHG | WEIGHT: 193 LBS | BODY MASS INDEX: 31.02 KG/M2 | OXYGEN SATURATION: 99 % | SYSTOLIC BLOOD PRESSURE: 120 MMHG

## 2019-02-20 DIAGNOSIS — F41.9 ANXIETY: ICD-10-CM

## 2019-02-20 DIAGNOSIS — M79.7 FIBROMYALGIA, PRIMARY: ICD-10-CM

## 2019-02-20 DIAGNOSIS — E05.00 GRAVES' DISEASE: ICD-10-CM

## 2019-02-20 DIAGNOSIS — E89.0 POSTABLATIVE HYPOTHYROIDISM: ICD-10-CM

## 2019-02-20 DIAGNOSIS — Z13.6 SCREENING, HEART DISEASE, ISCHEMIC: ICD-10-CM

## 2019-02-20 PROBLEM — Z00.00 WELLNESS EXAMINATION: Status: ACTIVE | Noted: 2019-02-20

## 2019-02-20 PROBLEM — F98.8 ADD (ATTENTION DEFICIT DISORDER): Status: ACTIVE | Noted: 2019-02-20

## 2019-02-20 PROBLEM — Z72.0 TOBACCO USE: Status: ACTIVE | Noted: 2019-02-20

## 2019-02-20 PROCEDURE — 99203 OFFICE O/P NEW LOW 30 MIN: CPT | Performed by: FAMILY MEDICINE

## 2019-02-20 RX ORDER — LIOTHYRONINE SODIUM 5 UG/1
5 TABLET ORAL DAILY
COMMUNITY
End: 2020-12-23

## 2019-02-20 NOTE — PROGRESS NOTES
Subjective   Kimberly Morgan is a 43 y.o. female presenting with chief complaint of:   Chief Complaint   Patient presents with   • Fibromyalgia     New patient exam.   • Graves' Disease       History of Present Illness :  Alone.  Here for primarily an acute issue today; to establish care.   Has multiple chronic problems to consider that might have a bearing on today's issues; somewhat an interval appointment.       Chronic/acute problems reviewed today:   1. Anxiety: Chronic/variable:  On/off anxiety tolerated somewhat.  Rx helps and not interested in Rx change. Stress ongoing.      2. Postablative hypothyroidism: History of ablation tx for Graves diease/hyperthyroidism.  Humberto TAN. Working here now; would like to come here for labs/etc.     3. Fibromyalgia, primary      Has an/another acute issue today: none.    The following portions of the patient's history were reviewed and updated as appropriate: allergies, current medications, past family history, past medical history, past social history, past surgical history and problem list.      Current Outpatient Medications:   •  ibuprofen (ADVIL,MOTRIN) 600 MG tablet, Take 800 mg by mouth Every Night., Disp: , Rfl:   •  levothyroxine (SYNTHROID, LEVOTHROID) 200 MCG tablet, Take 1 tablet by mouth Daily., Disp: , Rfl:   •  liothyronine (CYTOMEL) 5 MCG tablet, Take 5 mcg by mouth Daily., Disp: , Rfl:     No problems with medications.  Refills if needed done    No Known Allergies    Review of Systems  GENERAL:  Active home/work. Sleep is ok. No fever now.  SKIN: No rash/skin lesion of concern:   ENDO:  No syncope, near or diaphoretic sweaty spells.  HEENT: No recent head injury; occ/usual headache.  No vision change.  No hearing loss.  Ears without pain/drainage.  No sore throat.  No nasal/sinus congestion/drainage. No epistaxis.  CHEST: No chest wall tenderness or mass. No significant cough,  without wheeze.  No SOB; no hemoptysis.  CV: No exertional chest pain,  "palpitations, ankle edema.  GI: No heartburn, dysphagia.  No abdominal pain, diarrhea, constipation.  No rectal bleeding, or melena.    :  Voids without dysuria, or  incontinence to completion.  ORTHO: No painful/swollen joints but various on /off sore.  No sore neck or back.  No acute neck or back pain without recent injury.  NEURO: No dizziness, weakness of extremities.  No numbness/paresthesias.   PSYCH: No memory loss.  Mood good; occ anxious, depressed but/and not suicidal.  Tries to tolerate stress .   Screening:  [place]:   Mammogram: 10.20.17  Bone density: NA  Low dose CT chest: NA  GI: NA  Prostate: NA  Usual lab order  ?     Lab Results:  Results for orders placed or performed in visit on 07/03/18   T3, Free   Result Value Ref Range    T3, Free 5.67 (H) 2.77 - 5.27 pg/mL   T4, Free   Result Value Ref Range    Free T4 2.61 (H) 0.78 - 2.19 ng/dL   TSH   Result Value Ref Range    TSH 0.039 (L) 0.470 - 4.680 mIU/mL     THYROID:  Lab Results - Last 18 Months   Lab Units 07/03/18  1322   TSH mIU/mL 0.039*   T3 FREE pg/mL 5.67*       Objective   /64 (BP Location: Left arm, Patient Position: Sitting, Cuff Size: Adult)   Pulse 93   Temp 98.6 °F (37 °C) (Oral)   Resp 18   Ht 167.6 cm (66\")   Wt 87.5 kg (193 lb)   SpO2 99%   BMI 31.15 kg/m²   Body mass index is 31.15 kg/m².    Physical Exam  GENERAL:  Well nourished/developed in no acute distress.   SKIN: Turgor excellent, without wound, rash, lesion.  HEENT: Normal cephalic without trauma.  Pupils equal round reactive to light. Extraocular motions full without nystagmus.   External canals nonobstructive nontender without reddness. Tymphatic membranes simeon with brooklynn structures intact.   Oral cavity without growths, exudates, and moist.  Posterior pharynx without mass, obstruction, redness.  No thyromegaly, mass, tenderness, lymphadenopathy and supple.  CV: Regular rhythm.  No murmur, gallop,  edema. Posterior pulses intact.  No carotid bruits.  CHEST: " No chest wall tenderness or mass.   LUNGS: Symmetric motion with clear to auscultation.   ABD: Soft, nontender without mass.   ORTHO: Symmetric extremities without swelling/point tenderness.  Full gross range of motion.  NEURO: CN 2-12 grossly intact.  Symmetric facies and UE/LE. 4/5 strength throughout. 2/4 x bicep knee equal reflexes.  Nonfocal use extremities. Speech clear.  Intact light touch with monofilament, vibratory sensation with tuning fork; equal toes/distal feet    PSYCH: Oriented x 3.  Pleasant calm, well kept.  Purposeful/directed conservation with intact short/long gross memory.     Assessment/Plan     1. Anxiety    2. Postablative hypothyroidism    3. Fibromyalgia, primary        Rx: reviewed/changes:  No orders of the defined types were placed in this encounter.      LAB/Testing/Referrals: reviewed/orders:   Today:   No orders of the defined types were placed in this encounter.    Chronic/recurrent labs above or change to:   above     Discussions:   Thyroid adjustment to Rx  Smoking cessation  Body mass index is 31.15 kg/m².  Patient's Body mass index is 31.15 kg/m². BMI is above normal parameters. Recommendations include: exercise counseling, nutrition counseling and as able.    Tobacco use reviewed:    Smoker (uses cigrettes) I advised Kimberly of the risks of continuing to use tobacco, and I provided her with tobacco cessation educational materials in the After Visit Summary.     During this visit, I spent 5 minutes counseling the patient regarding tobacco cessation; usual handout.     Patient Instructions   Steps to Quit tobacco     Smoking or other ways of using tobacco can be harmful to your health and can affect almost every organ in your body.  Tobacco puts you, and those around you, at risk for developing many serious chronic diseases. Quitting tobacco is difficult, but it is one of the best things that you can do for your health. It is never too late to quit.    WHAT ARE THE BENEFITS OF  "QUITTING TOBACCO?  When you quit smoking, you lower your risk of developing serious diseases and conditions, such as:  · Lung cancer or lung disease, such as COPD.  · Heart disease.  · Stroke.  · Heart attack.  · Infertility.  · Osteoporosis and bone fractures.  · Cancer of the mouth  Additionally, symptoms such as coughing, wheezing, and shortness of breath may get better when you quit. You may also find that you get sick less often because your body is stronger at fighting off colds and infections. If you are female/pregnant, quitting smoking can help to reduce your chances of having a baby of low birth weight.    HOW DO I GET READY TO QUIT?  When you decide to quit tobacco, create a plan to make sure that you are successful. Before you quit:  · Pick a date to start the process. Set a date within the next two weeks to give you time to prepare.  · Write down the reasons why you are quitting. Keep this list in places where you will see it often, such as on your bathroom mirror or in your car or wallet.  · Identify the people, places, things, and activities that make you want to smoke (triggers) and avoid them.  ·  If you decide to quit \"cold turkey\" make sure to take these actions:  ¨ Throw away all cigarettes/tobacco at home, at work, and in your car.  ¨ Throw away smoking accessories, such as ashtrays and lighters.  ¨ Clean your car and make sure to empty the ashtray.  ¨ Clean your home, including curtains and carpets.  · If you decide to quit by smoking while using nicotene products  ¨ Get your nicotene patches, gum (or alternative) together and ready for the process.   ¨ As you start the patch; 3-4 days later start slowly weaning the cigarette; every time you want a cigarette chew nicotene gum instead.    ¨ Slowly over several weeks wean the cigarettes away; then begin to wean away the nicotene patch which starts a 21 mg, and then comes in 14 mg, and 7 mg strengths.   ¨ Every time you want a cigarette; chew a " "nicotene gum.   ¨ If this takes 6m; dont stop.   · Tell your family, friends, and coworkers that you are quitting. Support from your loved ones can make quitting easier.  · Call the [place] TO[place]AC[place] QUITLINE 3-310-IVAS-YES (1-756.978.2618) or go to their web site www.idph.[facility].[place]/smokefree/sf_quit htm  · We stand ready to help you with medications that you might be able to take that could make your quitting a lot easier and more likely successful.   · Consider a return/repeat visit to go over all of this and be successful at quitting.     WHAT STRATEGIES CAN I USE TO QUIT TOBACCO?   Talk with us about different strategies to quit tobacco. Some strategies include:  · Quitting tobacco altogether instead of gradually lessening how much you smoke over a period of time. Research shows that quitting \"cold turkey\" is more successful than gradually quitting.  But if you chose the slower approach above while smoking; that is ok.   · Attending in-person counseling to help you build problem-solving skills. You are more likely to have success in quitting if you attend several counseling sessions. Even short sessions of 10 minutes can be effective.  · Finding resources and support systems that can help you to quit tobacco and remain smoke-free after you quit. These resources are most helpful when you use them often. They can include:  ¨ Online chats with a counselor.  ¨ Telephone quitlines.  ¨ Printed self-help materials.  ¨ Support groups or group counseling.  ¨ Text messaging programs.  ¨ Mobile phone applications.  · Taking medicines to help you quit tobacco. (If you are pregnant or breastfeeding, talk with your health care provider first.) Some medicines contain nicotine and some do not. Both types of medicines help with cravings, but the medicines that include nicotine help to relieve withdrawal symptoms. Your health care provider may recommend:  ¨ Nicotine patches, gum, or lozenges.  ¨ Nicotine inhalers or " sprays.  ¨ Non-nicotine medicine that is taken by mouth.  Talk with us about combining strategies, such as taking medicines while you are also receiving in-person counseling. Using these two strategies together makes you more likely to succeed in quitting than if you used either strategy on its own.  If you are pregnant or breastfeeding, talk with us about finding counseling or other support strategies to quit smoking. Do not take medicine to help you quit smoking unless told to do so by us or another health care provider.     WHAT THINGS CAN I DO TO MAKE IT EASIER TO QUIT?  Quitting tobacco might feel overwhelming at first, but there is a lot that you can do to make it easier. Take these important actions:  · Reach out to your family and friends and ask that they support and encourage you during this time. Call telephone quitlines, reach out to support groups, or work with a counselor for support.  · Ask people who use tobacco to avoid using it around you.  · Avoid places that trigger you to use tobacco, such as bars, parties, or smoke-break areas at work.  · Spend time around people who do not use tobacco.  · Lessen stress in your life, because stress can be a tobacco trigger for some people. To lessen stress, try:  ¨ Exercising regularly.  ¨ Deep-breathing exercises.  ¨ Yoga.  ¨ Meditating.  ¨ Performing a body scan. This involves closing your eyes, scanning your body from head to toe, and noticing which parts of your body are particularly tense. Purposefully relax the muscles in those areas.  · Download or purchase mobile phone or tablet apps (applications) that can help you stick to your quit plan by providing reminders, tips, and encouragement. There are many free apps, such as QuitGuide from the CDC (Centers for Disease Control and Prevention). You can find other support for quitting tobacco (tobacco cessation) through smokefree.gov and other websites.    HOW WILL I FEEL WHEN I QUIT  TOBACCO?  Within the  first 24 hours of quitting tobacco, you may start to feel some withdrawal symptoms. These symptoms are usually most noticeable 2-3 days after quitting, but they usually do not last beyond 2-3 weeks. Changes or symptoms that you might experience include:  · Mood swings.  · Restlessness, anxiety, or irritation.  · Difficulty concentrating.  · Dizziness.  · Strong cravings for sugary foods in addition to nicotine.  · Mild weight gain.  · Constipation.  · Nausea.  · Coughing or a sore throat.  · Changes in how your medicines work in your body.  · A depressed mood.  · Difficulty sleeping (insomnia).  After the first 2-3 weeks of quitting, you may start to notice more positive results, such as:  · Improved sense of smell and taste.  · Decreased coughing and sore throat.  · Slower heart rate.  · Lower blood pressure.  · Clearer skin.  · The ability to breathe more easily.  · Fewer sick days.  Quitting tobacco is very challenging for most people. Do not get discouraged if you are not successful the first time. Some people need to make many attempts to quit before they achieve long-term success. Do your best to stick to your quit plan, and talk with us if you have questions, problems, or concerns.                Follow up: Return for Dr Mcdowell-, 6 m;.  No future appointments.

## 2019-02-20 NOTE — PATIENT INSTRUCTIONS
"Steps to Quit tobacco     Smoking or other ways of using tobacco can be harmful to your health and can affect almost every organ in your body.  Tobacco puts you, and those around you, at risk for developing many serious chronic diseases. Quitting tobacco is difficult, but it is one of the best things that you can do for your health. It is never too late to quit.    WHAT ARE THE BENEFITS OF QUITTING TOBACCO?  When you quit smoking, you lower your risk of developing serious diseases and conditions, such as:  · Lung cancer or lung disease, such as COPD.  · Heart disease.  · Stroke.  · Heart attack.  · Infertility.  · Osteoporosis and bone fractures.  · Cancer of the mouth  Additionally, symptoms such as coughing, wheezing, and shortness of breath may get better when you quit. You may also find that you get sick less often because your body is stronger at fighting off colds and infections. If you are female/pregnant, quitting smoking can help to reduce your chances of having a baby of low birth weight.    HOW DO I GET READY TO QUIT?  When you decide to quit tobacco, create a plan to make sure that you are successful. Before you quit:  · Pick a date to start the process. Set a date within the next two weeks to give you time to prepare.  · Write down the reasons why you are quitting. Keep this list in places where you will see it often, such as on your bathroom mirror or in your car or wallet.  · Identify the people, places, things, and activities that make you want to smoke (triggers) and avoid them.  ·  If you decide to quit \"cold turkey\" make sure to take these actions:  ¨ Throw away all cigarettes/tobacco at home, at work, and in your car.  ¨ Throw away smoking accessories, such as ashtrays and lighters.  ¨ Clean your car and make sure to empty the ashtray.  ¨ Clean your home, including curtains and carpets.  · If you decide to quit by smoking while using nicotene products  ¨ Get your nicotene patches, gum (or " "alternative) together and ready for the process.   ¨ As you start the patch; 3-4 days later start slowly weaning the cigarette; every time you want a cigarette chew nicotene gum instead.    ¨ Slowly over several weeks wean the cigarettes away; then begin to wean away the nicotene patch which starts a 21 mg, and then comes in 14 mg, and 7 mg strengths.   ¨ Every time you want a cigarette; chew a nicotene gum.   ¨ If this takes 6m; dont stop.   · Tell your family, friends, and coworkers that you are quitting. Support from your loved ones can make quitting easier.  · Call the [place] QU[place] 9-929-VZRR-YES (1-709.439.2386) or go to their web site www.idph.[facility].[email or web address]/smokefree/sf_quit htm  · We stand ready to help you with medications that you might be able to take that could make your quitting a lot easier and more likely successful.   · Consider a return/repeat visit to go over all of this and be successful at quitting.     WHAT STRATEGIES CAN I USE TO QUIT TOBACCO?   Talk with us about different strategies to quit tobacco. Some strategies include:  · Quitting tobacco altogether instead of gradually lessening how much you smoke over a period of time. Research shows that quitting \"cold turkey\" is more successful than gradually quitting.  But if you chose the slower approach above while smoking; that is ok.   · Attending in-person counseling to help you build problem-solving skills. You are more likely to have success in quitting if you attend several counseling sessions. Even short sessions of 10 minutes can be effective.  · Finding resources and support systems that can help you to quit tobacco and remain smoke-free after you quit. These resources are most helpful when you use them often. They can include:  ¨ Online chats with a counselor.  ¨ Telephone quitlines.  ¨ Printed self-help materials.  ¨ Support groups or group counseling.  ¨ Text messaging programs.  ¨ Mobile phone applications.  · Taking " medicines to help you quit tobacco. (If you are pregnant or breastfeeding, talk with your health care provider first.) Some medicines contain nicotine and some do not. Both types of medicines help with cravings, but the medicines that include nicotine help to relieve withdrawal symptoms. Your health care provider may recommend:  ¨ Nicotine patches, gum, or lozenges.  ¨ Nicotine inhalers or sprays.  ¨ Non-nicotine medicine that is taken by mouth.  Talk with us about combining strategies, such as taking medicines while you are also receiving in-person counseling. Using these two strategies together makes you more likely to succeed in quitting than if you used either strategy on its own.  If you are pregnant or breastfeeding, talk with us about finding counseling or other support strategies to quit smoking. Do not take medicine to help you quit smoking unless told to do so by us or another health care provider.     WHAT THINGS CAN I DO TO MAKE IT EASIER TO QUIT?  Quitting tobacco might feel overwhelming at first, but there is a lot that you can do to make it easier. Take these important actions:  · Reach out to your family and friends and ask that they support and encourage you during this time. Call telephone quitlines, reach out to support groups, or work with a counselor for support.  · Ask people who use tobacco to avoid using it around you.  · Avoid places that trigger you to use tobacco, such as bars, parties, or smoke-break areas at work.  · Spend time around people who do not use tobacco.  · Lessen stress in your life, because stress can be a tobacco trigger for some people. To lessen stress, try:  ¨ Exercising regularly.  ¨ Deep-breathing exercises.  ¨ Yoga.  ¨ Meditating.  ¨ Performing a body scan. This involves closing your eyes, scanning your body from head to toe, and noticing which parts of your body are particularly tense. Purposefully relax the muscles in those areas.  · Download or purchase mobile  phone or tablet apps (applications) that can help you stick to your quit plan by providing reminders, tips, and encouragement. There are many free apps, such as QuitGuide from the CDC (Centers for Disease Control and Prevention). You can find other support for quitting tobacco (tobacco cessation) through smokefree.gov and other websites.    HOW WILL I FEEL WHEN I QUIT  TOBACCO?  Within the first 24 hours of quitting tobacco, you may start to feel some withdrawal symptoms. These symptoms are usually most noticeable 2-3 days after quitting, but they usually do not last beyond 2-3 weeks. Changes or symptoms that you might experience include:  · Mood swings.  · Restlessness, anxiety, or irritation.  · Difficulty concentrating.  · Dizziness.  · Strong cravings for sugary foods in addition to nicotine.  · Mild weight gain.  · Constipation.  · Nausea.  · Coughing or a sore throat.  · Changes in how your medicines work in your body.  · A depressed mood.  · Difficulty sleeping (insomnia).  After the first 2-3 weeks of quitting, you may start to notice more positive results, such as:  · Improved sense of smell and taste.  · Decreased coughing and sore throat.  · Slower heart rate.  · Lower blood pressure.  · Clearer skin.  · The ability to breathe more easily.  · Fewer sick days.  Quitting tobacco is very challenging for most people. Do not get discouraged if you are not successful the first time. Some people need to make many attempts to quit before they achieve long-term success. Do your best to stick to your quit plan, and talk with us if you have questions, problems, or concerns.

## 2019-02-21 PROBLEM — Z01.89 LABORATORY TEST: Status: ACTIVE | Noted: 2019-02-21

## 2019-02-21 LAB
ALBUMIN SERPL-MCNC: 3.7 G/DL (ref 3.5–5)
ALBUMIN/GLOB SERPL: 1.3 G/DL (ref 1.1–2.5)
ALP SERPL-CCNC: 68 U/L (ref 24–120)
ALT SERPL-CCNC: 27 U/L (ref 0–54)
AST SERPL-CCNC: 16 U/L (ref 7–45)
BASOPHILS # BLD AUTO: 0.04 10*3/MM3 (ref 0–0.2)
BASOPHILS NFR BLD AUTO: 0.6 % (ref 0–2)
BILIRUB SERPL-MCNC: 0.3 MG/DL (ref 0.1–1)
BUN SERPL-MCNC: 17 MG/DL (ref 5–21)
BUN/CREAT SERPL: 27.4 (ref 7–25)
CALCIUM SERPL-MCNC: 8.9 MG/DL (ref 8.4–10.4)
CHLORIDE SERPL-SCNC: 103 MMOL/L (ref 98–110)
CHOLEST SERPL-MCNC: 150 MG/DL (ref 130–200)
CHOLEST/HDLC SERPL: 4.69 {RATIO}
CO2 SERPL-SCNC: 26 MMOL/L (ref 24–31)
CREAT SERPL-MCNC: 0.62 MG/DL (ref 0.5–1.4)
EOSINOPHIL # BLD AUTO: 0.08 10*3/MM3 (ref 0–0.7)
EOSINOPHIL NFR BLD AUTO: 1.2 % (ref 0–4)
ERYTHROCYTE [DISTWIDTH] IN BLOOD BY AUTOMATED COUNT: 13 % (ref 12–15)
GLOBULIN SER CALC-MCNC: 2.9 GM/DL
GLUCOSE SERPL-MCNC: 91 MG/DL (ref 70–100)
HCT VFR BLD AUTO: 40.7 % (ref 37–47)
HDLC SERPL-MCNC: 32 MG/DL
HGB BLD-MCNC: 12.9 G/DL (ref 12–16)
IMM GRANULOCYTES # BLD AUTO: 0.01 10*3/MM3 (ref 0–0.05)
IMM GRANULOCYTES NFR BLD AUTO: 0.2 % (ref 0–5)
LDLC SERPL CALC-MCNC: 94 MG/DL (ref 0–99)
LYMPHOCYTES # BLD AUTO: 3 10*3/MM3 (ref 0.72–4.86)
LYMPHOCYTES NFR BLD AUTO: 46.7 % (ref 15–45)
MCH RBC QN AUTO: 29.1 PG (ref 28–32)
MCHC RBC AUTO-ENTMCNC: 31.7 G/DL (ref 33–36)
MCV RBC AUTO: 91.7 FL (ref 82–98)
MONOCYTES # BLD AUTO: 0.58 10*3/MM3 (ref 0.19–1.3)
MONOCYTES NFR BLD AUTO: 9 % (ref 4–12)
NEUTROPHILS # BLD AUTO: 2.71 10*3/MM3 (ref 1.87–8.4)
NEUTROPHILS NFR BLD AUTO: 42.3 % (ref 39–78)
NRBC BLD AUTO-RTO: 0 /100 WBC (ref 0–0)
PLATELET # BLD AUTO: 212 10*3/MM3 (ref 130–400)
POTASSIUM SERPL-SCNC: 3.9 MMOL/L (ref 3.5–5.3)
PROT SERPL-MCNC: 6.6 G/DL (ref 6.3–8.7)
RBC # BLD AUTO: 4.44 10*6/MM3 (ref 4.2–5.4)
SODIUM SERPL-SCNC: 137 MMOL/L (ref 135–145)
T3FREE SERPL-MCNC: 4.1 PG/ML (ref 2–4.4)
T4 FREE SERPL-MCNC: 2.5 NG/DL (ref 0.78–2.19)
TRIGL SERPL-MCNC: 122 MG/DL (ref 0–149)
TSH SERPL DL<=0.005 MIU/L-ACNC: 0.08 MIU/ML (ref 0.47–4.68)
VLDLC SERPL CALC-MCNC: 24.4 MG/DL
WBC # BLD AUTO: 6.42 10*3/MM3 (ref 4.8–10.8)

## 2019-06-25 NOTE — PROGRESS NOTES
Subjective:      Patient ID: Yoselyn Carpenter is a 37 y.o. female. HPI   Patient comes to clinic with chief complaint of toothache   Review of Systems   Constitutional: Negative. Negative for activity change, appetite change, chills, fatigue and fever. HENT: Positive for dental problem. Negative for congestion, ear pain and sore throat. Eyes: Negative for photophobia, pain, discharge and visual disturbance. Respiratory: Negative for cough, shortness of breath and wheezing. Cardiovascular: Negative for chest pain, palpitations and leg swelling. Gastrointestinal: Negative for abdominal pain, blood in stool, constipation, diarrhea, nausea and vomiting. Genitourinary: Negative for dysuria. Musculoskeletal: Negative. Negative for arthralgias and myalgias. Skin: Negative for rash. Neurological: Negative for dizziness, syncope, weakness, numbness and headaches. Psychiatric/Behavioral: Negative. Objective:   Physical Exam   Constitutional: She is oriented to person, place, and time. She appears well-developed. HENT:   asesment of oral mucosa reveals some white patches above second to last molar, no obvious redness or edema, pain with any touching of teeth with tongue blade   Pulmonary/Chest: Effort normal.   Musculoskeletal: Normal range of motion. Neurological: She is alert and oriented to person, place, and time. Assessment:        Diagnosis Orders   1.  Tooth ache  amoxicillin-clavulanate (AUGMENTIN) 875-125 MG per tablet           Plan:      Current Outpatient Rx   Medication Sig Dispense Refill    levothyroxine (SYNTHROID) 175 MCG tablet TAKE 1 TABLET ON AN EMPTY STOMACH IN THE MORNING FOR 30 DAYS  6    ibuprofen (ADVIL;MOTRIN) 200 MG tablet Take 200 mg by mouth every 6 hours as needed for Pain      amoxicillin-clavulanate (AUGMENTIN) 875-125 MG per tablet Take 1 tablet by mouth 2 times daily for 10 days 20 tablet 0    DULoxetine (CYMBALTA) 60 MG capsule Take 60 mg by mouth 2
.

## 2019-09-03 ENCOUNTER — TRANSCRIBE ORDERS (OUTPATIENT)
Dept: ADMINISTRATIVE | Facility: HOSPITAL | Age: 44
End: 2019-09-03

## 2019-09-03 DIAGNOSIS — M54.5 LOW BACK PAIN, UNSPECIFIED BACK PAIN LATERALITY, UNSPECIFIED CHRONICITY, WITH SCIATICA PRESENCE UNSPECIFIED: Primary | ICD-10-CM

## 2019-09-06 ENCOUNTER — HOSPITAL ENCOUNTER (OUTPATIENT)
Dept: MRI IMAGING | Facility: HOSPITAL | Age: 44
Discharge: HOME | End: 2019-09-06
Admitting: NURSE PRACTITIONER

## 2019-09-06 DIAGNOSIS — M54.5 LOW BACK PAIN, UNSPECIFIED BACK PAIN LATERALITY, UNSPECIFIED CHRONICITY, WITH SCIATICA PRESENCE UNSPECIFIED: ICD-10-CM

## 2019-09-06 PROCEDURE — 72148 MRI LUMBAR SPINE W/O DYE: CPT

## 2019-09-17 ENCOUNTER — HOSPITAL ENCOUNTER (OUTPATIENT)
Dept: GENERAL RADIOLOGY | Age: 44
Discharge: HOME | End: 2019-09-17
Payer: MEDICAID

## 2019-09-17 ENCOUNTER — HOSPITAL ENCOUNTER (OUTPATIENT)
Age: 44
Setting detail: OUTPATIENT SURGERY
Discharge: HOME | End: 2019-09-17
Attending: PHYSICAL MEDICINE & REHABILITATION | Admitting: PHYSICAL MEDICINE & REHABILITATION

## 2019-09-17 VITALS
DIASTOLIC BLOOD PRESSURE: 78 MMHG | BODY MASS INDEX: 30.99 KG/M2 | SYSTOLIC BLOOD PRESSURE: 117 MMHG | HEART RATE: 81 BPM | RESPIRATION RATE: 16 BRPM | HEIGHT: 65 IN | OXYGEN SATURATION: 98 % | WEIGHT: 186 LBS

## 2019-09-17 DIAGNOSIS — R52 PAIN: ICD-10-CM

## 2019-09-17 PROCEDURE — G8907 PT DOC NO EVENTS ON DISCHARG: HCPCS

## 2019-09-17 PROCEDURE — G0260 INJ FOR SACROILIAC JT ANESTH: HCPCS

## 2019-09-17 PROCEDURE — G8918 PT W/O PREOP ORDER IV AB PRO: HCPCS

## 2019-09-17 PROCEDURE — 3209999900 FLUORO FOR SURGICAL PROCEDURES

## 2019-09-17 RX ORDER — LIDOCAINE HYDROCHLORIDE 10 MG/ML
INJECTION, SOLUTION INFILTRATION; PERINEURAL PRN
Status: DISCONTINUED | OUTPATIENT
Start: 2019-09-17 | End: 2019-09-17 | Stop reason: ALTCHOICE

## 2019-09-17 RX ORDER — 0.9 % SODIUM CHLORIDE 0.9 %
VIAL (ML) INJECTION PRN
Status: DISCONTINUED | OUTPATIENT
Start: 2019-09-17 | End: 2019-09-17 | Stop reason: ALTCHOICE

## 2019-09-17 ASSESSMENT — PAIN SCALES - GENERAL: PAINLEVEL_OUTOF10: 0

## 2019-10-12 ENCOUNTER — OFFICE VISIT (OUTPATIENT)
Dept: RETAIL CLINIC | Facility: CLINIC | Age: 44
End: 2019-10-12

## 2019-10-12 VITALS
RESPIRATION RATE: 18 BRPM | BODY MASS INDEX: 29.21 KG/M2 | TEMPERATURE: 98.5 F | DIASTOLIC BLOOD PRESSURE: 64 MMHG | HEART RATE: 93 BPM | OXYGEN SATURATION: 98 % | SYSTOLIC BLOOD PRESSURE: 104 MMHG | WEIGHT: 181 LBS

## 2019-10-12 DIAGNOSIS — R30.0 DYSURIA: ICD-10-CM

## 2019-10-12 DIAGNOSIS — N30.01 ACUTE CYSTITIS WITH HEMATURIA: Primary | ICD-10-CM

## 2019-10-12 LAB
BILIRUB BLD-MCNC: NEGATIVE MG/DL
CLARITY, POC: ABNORMAL
COLOR UR: ABNORMAL
GLUCOSE UR STRIP-MCNC: NEGATIVE MG/DL
KETONES UR QL: NEGATIVE
LEUKOCYTE EST, POC: ABNORMAL
NITRITE UR-MCNC: POSITIVE MG/ML
PH UR: 6 [PH] (ref 5–8)
PROT UR STRIP-MCNC: NEGATIVE MG/DL
RBC # UR STRIP: ABNORMAL /UL
SP GR UR: 1.02 (ref 1–1.03)
UROBILINOGEN UR QL: NORMAL

## 2019-10-12 PROCEDURE — 99213 OFFICE O/P EST LOW 20 MIN: CPT | Performed by: NURSE PRACTITIONER

## 2019-10-12 RX ORDER — PHENAZOPYRIDINE HYDROCHLORIDE 200 MG/1
200 TABLET, FILM COATED ORAL 3 TIMES DAILY PRN
Qty: 6 TABLET | Refills: 0 | Status: SHIPPED | OUTPATIENT
Start: 2019-10-12 | End: 2020-09-04

## 2019-10-12 RX ORDER — SULFAMETHOXAZOLE AND TRIMETHOPRIM 800; 160 MG/1; MG/1
1 TABLET ORAL 2 TIMES DAILY
Qty: 10 TABLET | Refills: 0 | Status: SHIPPED | OUTPATIENT
Start: 2019-10-12 | End: 2019-10-17

## 2019-10-12 NOTE — PROGRESS NOTES
Chief Complaint   Patient presents with   • Urinary Tract Infection     Subjective   Kimberly Morgan is a 44 y.o. female who presents to the clinic today with complaints of:  Urinary Tract Infection    This is a new problem. Episode onset: two weeks ago. The problem has been waxing and waning. The quality of the pain is described as burning. Maximum temperature: low grade a few days ago. The fever has been present for less than 1 day. Associated symptoms include frequency and urgency. Pertinent negatives include no chills, discharge, flank pain, hematuria, nausea or vomiting. She has tried increased fluids for the symptoms. Her past medical history is significant for kidney stones and a urological procedure. There is no history of a single kidney or urinary stasis.     Current Outpatient Medications:   •  ibuprofen (ADVIL,MOTRIN) 600 MG tablet, Take 800 mg by mouth Every Night., Disp: , Rfl:   •  levothyroxine (SYNTHROID, LEVOTHROID) 200 MCG tablet, Take 1 tablet by mouth Daily., Disp: , Rfl:   •  liothyronine (CYTOMEL) 5 MCG tablet, Take 5 mcg by mouth Daily., Disp: , Rfl:     Allergies:  Patient has no known allergies.    Past Medical History:   Diagnosis Date   • ADD (attention deficit disorder)    • Anxiety    • Depression    • Disease of thyroid gland    • Fibromyalgia    • Fibromyalgia, primary      Past Surgical History:   Procedure Laterality Date   • ADENOIDECTOMY     • CARPAL TUNNEL RELEASE Left    •  SECTION     • CHOLECYSTECTOMY     • DILATATION AND CURETTAGE     • KIDNEY STONE SURGERY     • SALPINGECTOMY Bilateral    • TONSILLECTOMY     • WISDOM TOOTH EXTRACTION       Family History   Problem Relation Age of Onset   • Stroke Father    • Thyroid disease Father    • Hypertension Brother    • Ovarian cancer Maternal Grandmother    • Colon cancer Maternal Grandmother    • Deep vein thrombosis Maternal Grandmother    • Diabetes Maternal Grandmother    • Breast cancer Paternal Aunt    • Thyroid  disease Mother    • Uterine cancer Neg Hx      Social History     Tobacco Use   • Smoking status: Current Every Day Smoker     Packs/day: 0.25     Types: Cigarettes   • Smokeless tobacco: Never Used   Substance Use Topics   • Alcohol use: Yes     Comment: rarely   • Drug use: No       Review of Systems  Review of Systems   Constitutional: Positive for fever (low grade a few days ago, none since). Negative for chills.   Gastrointestinal: Negative for abdominal pain, diarrhea, nausea and vomiting.   Genitourinary: Positive for dysuria, frequency and urgency. Negative for flank pain, genital sores, hematuria, vaginal bleeding, vaginal discharge and vaginal pain.   Musculoskeletal: Positive for arthralgias (history of fibromyalgia) and myalgias (history of fibromyalgia).     Objective   /64   Pulse 93   Temp 98.5 °F (36.9 °C)   Resp 18   Wt 82.1 kg (181 lb)   LMP 09/24/2019 (Within Days)   SpO2 98%   BMI 29.21 kg/m²     Physical Exam   Constitutional: She is oriented to person, place, and time. She appears well-developed and well-nourished. No distress.   Cardiovascular: Normal rate, regular rhythm and normal heart sounds.   Pulmonary/Chest: Effort normal and breath sounds normal. No respiratory distress.   Abdominal: Soft. Normal appearance and bowel sounds are normal. There is tenderness in the suprapubic area. There is no rigidity, no rebound, no guarding and no CVA tenderness.   Neurological: She is alert and oriented to person, place, and time.   Skin: Skin is warm and dry.   Psychiatric: She has a normal mood and affect. Her behavior is normal.   Vitals reviewed.      Assessment/Plan     Kimberly was seen today for urinary tract infection.    Diagnoses and all orders for this visit:    Acute cystitis with hematuria  -     Urine Culture - Urine, Urine, Clean Catch    Dysuria  -     POC Urinalysis Dipstick, Automated    Other orders  -     sulfamethoxazole-trimethoprim (BACTRIM DS,SEPTRA DS) 800-160 MG  per tablet; Take 1 tablet by mouth 2 (Two) Times a Day for 5 days.  -     phenazopyridine (PYRIDIUM) 200 MG tablet; Take 1 tablet by mouth 3 (Three) Times a Day As Needed for bladder spasms.    POC Urinalysis Dipstick, Automated   Order: [id number]   Status:  Final result   Visible to patient:  No (Not Released)   Next appt:  None   Dx:  Dysuria   Component  Ref Range & Units 16:03   Color  Yellow, Straw, Dark Yellow, Kyung Dark Yellow    Clarity, UA  Clear Cloudy Abnormal     Specific Gravity   1.005 - 1.030 1.025    pH, Urine  5.0 - 8.0 6.0    Leukocytes  Negative Moderate (2+) Abnormal     Nitrite, UA  Negative Positive Abnormal     Protein, POC  Negative mg/dL Negative    Glucose, UA  Negative, 1000 mg/dL (3+) mg/dL Negative    Ketones, UA  Negative Negative    Urobilinogen, UA  Normal Normal    Bilirubin  Negative Negative    Blood, UA  Negative Small Abnormal     Resulting Agency [facility]         Specimen Collected: 10/12/19 16:03 Last Resulted: 10/12/19 16:07             A urine culture is being completed. We will call you with results in a few days.  If you have not heard from us after three days please call for results.     Please plan to have repeat urinalysis completed by your primary care provider after completion of antibiotic.     You have been diagnosed with a UTI or bladder infection.You have been counseled with the results of your urinalysis.  An antibiotic is prescribed for you today that needs to be taken until gone, whether or not you feel better. It is recommended you take a probiotic when taking an antibiotic. Probiotics are found over the counter in pill form and in some yogurt brands, both are recommended.    Increase fluid intake such as water and low sugar cranberry juice. Avoid caffeine as it irritates the bladder wall. Take pyridium as prescribed if needed for bladder pain. This medication will turn your urine orange or red.  Take Tylenol for fever/pain.     For  recurrent infections it is best to avoid bathing in a tub, always wipe front to back, urinate before and after intercourse, avoid tight fitting pants, and wear cotton underwear.    Go to your primary care provider, urgent care center, or emergency room if no improvement or you have increase in symptoms such as fever, nausea, vomiting, flank pain or other concerns.

## 2019-10-12 NOTE — PATIENT INSTRUCTIONS
A urine culture is being completed. We will call you with results in a few days.  If you have not heard from us after three days please call for results.     Increase fluid intake.     Please plan to have repeat urinalysis completed by your primary care provider after completion of antibiotic.     You have been diagnosed with a UTI or bladder infection.You have been counseled with the results of your urinalysis.  An antibiotic is prescribed for you today that needs to be taken until gone, whether or not you feel better. It is recommended you take a probiotic when taking an antibiotic. Probiotics are found over the counter in pill form and in some yogurt brands, both are recommended.    Increase fluid intake such as water and low sugar cranberry juice. Avoid caffeine as it irritates the bladder wall. Take pyridium as prescribed if needed for bladder pain. This medication will turn your urine orange or red.  Take Tylenol for fever/pain.     For recurrent infections it is best to avoid bathing in a tub, always wipe front to back, urinate before and after intercourse, avoid tight fitting pants, and wear cotton underwear.    Go to your primary care provider, urgent care center, or emergency room if no improvement or you have increase in symptoms such as fever, nausea, vomiting, flank pain or other concerns.

## 2019-10-17 ENCOUNTER — TELEPHONE (OUTPATIENT)
Dept: RETAIL CLINIC | Facility: CLINIC | Age: 44
End: 2019-10-17

## 2019-10-17 LAB
BACTERIA UR CULT: ABNORMAL
BACTERIA UR CULT: ABNORMAL
OTHER ANTIBIOTIC SUSC ISLT: ABNORMAL

## 2019-10-18 NOTE — TELEPHONE ENCOUNTER
Patient notified of culture results. She completed her antibiotic. She reports her symptoms have resolved. I have encouraged her to drink plenty of fluids, always wipe from front to back and to try to void before and after intercourse. Follow up with PCP with any new concerns. She verbalized understanding and denied having questions.

## 2020-06-29 ENCOUNTER — APPOINTMENT (OUTPATIENT)
Dept: CT IMAGING | Facility: HOSPITAL | Age: 45
End: 2020-06-29

## 2020-06-29 ENCOUNTER — HOSPITAL ENCOUNTER (EMERGENCY)
Facility: HOSPITAL | Age: 45
Discharge: HOME | End: 2020-06-29
Admitting: EMERGENCY MEDICINE

## 2020-06-29 ENCOUNTER — OFFICE VISIT (OUTPATIENT)
Dept: OBSTETRICS AND GYNECOLOGY | Facility: CLINIC | Age: 45
End: 2020-06-29

## 2020-06-29 VITALS
BODY MASS INDEX: 29.16 KG/M2 | RESPIRATION RATE: 14 BRPM | SYSTOLIC BLOOD PRESSURE: 124 MMHG | HEIGHT: 65 IN | WEIGHT: 175 LBS | OXYGEN SATURATION: 98 % | HEART RATE: 68 BPM | TEMPERATURE: 98.4 F | DIASTOLIC BLOOD PRESSURE: 74 MMHG

## 2020-06-29 VITALS
SYSTOLIC BLOOD PRESSURE: 108 MMHG | WEIGHT: 173 LBS | BODY MASS INDEX: 28.82 KG/M2 | HEIGHT: 65 IN | DIASTOLIC BLOOD PRESSURE: 74 MMHG

## 2020-06-29 DIAGNOSIS — K59.00 CONSTIPATION, UNSPECIFIED CONSTIPATION TYPE: ICD-10-CM

## 2020-06-29 DIAGNOSIS — N83.202 CYST OF LEFT OVARY: ICD-10-CM

## 2020-06-29 DIAGNOSIS — R10.9 FLANK PAIN: Primary | ICD-10-CM

## 2020-06-29 DIAGNOSIS — N39.0 URINARY TRACT INFECTION WITH HEMATURIA, SITE UNSPECIFIED: Primary | ICD-10-CM

## 2020-06-29 DIAGNOSIS — R31.9 URINARY TRACT INFECTION WITH HEMATURIA, SITE UNSPECIFIED: Primary | ICD-10-CM

## 2020-06-29 LAB
ALBUMIN SERPL-MCNC: 4.1 G/DL (ref 3.5–5.2)
ALBUMIN/GLOB SERPL: 1.2 G/DL
ALP SERPL-CCNC: 82 U/L (ref 39–117)
ALT SERPL W P-5'-P-CCNC: 17 U/L (ref 1–33)
ANION GAP SERPL CALCULATED.3IONS-SCNC: 13 MMOL/L (ref 5–15)
APTT PPP: 28.6 SECONDS (ref 24.1–35)
AST SERPL-CCNC: 19 U/L (ref 1–32)
B-HCG UR QL: NEGATIVE
BACTERIA UR QL AUTO: ABNORMAL /HPF
BASOPHILS # BLD AUTO: 0.04 10*3/MM3 (ref 0–0.2)
BASOPHILS NFR BLD AUTO: 0.5 % (ref 0–1.5)
BILIRUB BLD-MCNC: ABNORMAL MG/DL
BILIRUB SERPL-MCNC: <0.2 MG/DL (ref 0.2–1.2)
BILIRUB UR QL STRIP: ABNORMAL
BUN SERPL-MCNC: 19 MG/DL (ref 6–20)
BUN/CREAT SERPL: 22.4 (ref 7–25)
CALCIUM SPEC-SCNC: 9.6 MG/DL (ref 8.6–10.5)
CHLORIDE SERPL-SCNC: 102 MMOL/L (ref 98–107)
CLARITY UR: ABNORMAL
CLARITY, POC: ABNORMAL
CO2 SERPL-SCNC: 24 MMOL/L (ref 22–29)
COLOR UR: ABNORMAL
COLOR UR: ABNORMAL
CREAT SERPL-MCNC: 0.85 MG/DL (ref 0.57–1)
DEPRECATED RDW RBC AUTO: 41.1 FL (ref 37–54)
EOSINOPHIL # BLD AUTO: 0.13 10*3/MM3 (ref 0–0.4)
EOSINOPHIL NFR BLD AUTO: 1.5 % (ref 0.3–6.2)
ERYTHROCYTE [DISTWIDTH] IN BLOOD BY AUTOMATED COUNT: 12.9 % (ref 12.3–15.4)
GFR SERPL CREATININE-BSD FRML MDRD: 73 ML/MIN/1.73
GLOBULIN UR ELPH-MCNC: 3.4 GM/DL
GLUCOSE SERPL-MCNC: 143 MG/DL (ref 65–99)
GLUCOSE UR STRIP-MCNC: NEGATIVE MG/DL
GLUCOSE UR STRIP-MCNC: NEGATIVE MG/DL
HCT VFR BLD AUTO: 45.7 % (ref 34–46.6)
HGB BLD-MCNC: 14.9 G/DL (ref 12–15.9)
HGB UR QL STRIP.AUTO: ABNORMAL
HYALINE CASTS UR QL AUTO: ABNORMAL /LPF
IMM GRANULOCYTES # BLD AUTO: 0.02 10*3/MM3 (ref 0–0.05)
IMM GRANULOCYTES NFR BLD AUTO: 0.2 % (ref 0–0.5)
INR PPP: 0.9 (ref 0.91–1.09)
INTERNAL NEGATIVE CONTROL: NEGATIVE
INTERNAL POSITIVE CONTROL: POSITIVE
KETONES UR QL STRIP: ABNORMAL
KETONES UR QL: ABNORMAL
LEUKOCYTE EST, POC: NEGATIVE
LEUKOCYTE ESTERASE UR QL STRIP.AUTO: ABNORMAL
LIPASE SERPL-CCNC: 31 U/L (ref 13–60)
LYMPHOCYTES # BLD AUTO: 4.18 10*3/MM3 (ref 0.7–3.1)
LYMPHOCYTES NFR BLD AUTO: 47.1 % (ref 19.6–45.3)
Lab: NORMAL
MCH RBC QN AUTO: 28.5 PG (ref 26.6–33)
MCHC RBC AUTO-ENTMCNC: 32.6 G/DL (ref 31.5–35.7)
MCV RBC AUTO: 87.5 FL (ref 79–97)
MONOCYTES # BLD AUTO: 0.54 10*3/MM3 (ref 0.1–0.9)
MONOCYTES NFR BLD AUTO: 6.1 % (ref 5–12)
NEUTROPHILS NFR BLD AUTO: 3.97 10*3/MM3 (ref 1.7–7)
NEUTROPHILS NFR BLD AUTO: 44.6 % (ref 42.7–76)
NITRITE UR QL STRIP: POSITIVE
NITRITE UR-MCNC: NEGATIVE MG/ML
NRBC BLD AUTO-RTO: 0 /100 WBC (ref 0–0.2)
PH UR STRIP.AUTO: <=5 [PH] (ref 5–8)
PH UR: 5 [PH] (ref 5–8)
PLATELET # BLD AUTO: 255 10*3/MM3 (ref 140–450)
PMV BLD AUTO: 11.2 FL (ref 6–12)
POTASSIUM SERPL-SCNC: 3.4 MMOL/L (ref 3.5–5.2)
PROT SERPL-MCNC: 7.5 G/DL (ref 6–8.5)
PROT UR QL STRIP: ABNORMAL
PROT UR STRIP-MCNC: ABNORMAL MG/DL
PROTHROMBIN TIME: 11.8 SECONDS (ref 11.9–14.6)
RBC # BLD AUTO: 5.22 10*6/MM3 (ref 3.77–5.28)
RBC # UR STRIP: ABNORMAL /UL
RBC # UR: ABNORMAL /HPF
REF LAB TEST METHOD: ABNORMAL
SODIUM SERPL-SCNC: 139 MMOL/L (ref 136–145)
SP GR UR STRIP: >1.03 (ref 1–1.03)
SP GR UR: 1.01 (ref 1–1.03)
SQUAMOUS #/AREA URNS HPF: ABNORMAL /HPF
UROBILINOGEN UR QL STRIP: ABNORMAL
UROBILINOGEN UR QL: NORMAL
WBC # BLD AUTO: 8.88 10*3/MM3 (ref 3.4–10.8)
WBC UR QL AUTO: ABNORMAL /HPF

## 2020-06-29 PROCEDURE — 85610 PROTHROMBIN TIME: CPT | Performed by: NURSE PRACTITIONER

## 2020-06-29 PROCEDURE — 99284 EMERGENCY DEPT VISIT MOD MDM: CPT

## 2020-06-29 PROCEDURE — 81025 URINE PREGNANCY TEST: CPT | Performed by: NURSE PRACTITIONER

## 2020-06-29 PROCEDURE — 25010000002 HYDROMORPHONE PER 4 MG: Performed by: NURSE PRACTITIONER

## 2020-06-29 PROCEDURE — 25010000002 ONDANSETRON PER 1 MG: Performed by: NURSE PRACTITIONER

## 2020-06-29 PROCEDURE — 80053 COMPREHEN METABOLIC PANEL: CPT | Performed by: NURSE PRACTITIONER

## 2020-06-29 PROCEDURE — 87186 SC STD MICRODIL/AGAR DIL: CPT | Performed by: NURSE PRACTITIONER

## 2020-06-29 PROCEDURE — 81001 URINALYSIS AUTO W/SCOPE: CPT | Performed by: NURSE PRACTITIONER

## 2020-06-29 PROCEDURE — 96374 THER/PROPH/DIAG INJ IV PUSH: CPT

## 2020-06-29 PROCEDURE — 85025 COMPLETE CBC W/AUTO DIFF WBC: CPT | Performed by: NURSE PRACTITIONER

## 2020-06-29 PROCEDURE — 85730 THROMBOPLASTIN TIME PARTIAL: CPT | Performed by: NURSE PRACTITIONER

## 2020-06-29 PROCEDURE — 87077 CULTURE AEROBIC IDENTIFY: CPT | Performed by: NURSE PRACTITIONER

## 2020-06-29 PROCEDURE — 74176 CT ABD & PELVIS W/O CONTRAST: CPT

## 2020-06-29 PROCEDURE — 99213 OFFICE O/P EST LOW 20 MIN: CPT | Performed by: OBSTETRICS & GYNECOLOGY

## 2020-06-29 PROCEDURE — 83690 ASSAY OF LIPASE: CPT | Performed by: NURSE PRACTITIONER

## 2020-06-29 PROCEDURE — 96375 TX/PRO/DX INJ NEW DRUG ADDON: CPT

## 2020-06-29 PROCEDURE — 87086 URINE CULTURE/COLONY COUNT: CPT | Performed by: NURSE PRACTITIONER

## 2020-06-29 RX ORDER — ATOMOXETINE 100 MG/1
CAPSULE ORAL
COMMUNITY
Start: 2020-06-01 | End: 2020-11-04

## 2020-06-29 RX ORDER — PAROXETINE HYDROCHLORIDE 20 MG/1
TABLET, FILM COATED ORAL
COMMUNITY
Start: 2020-06-24 | End: 2020-11-04

## 2020-06-29 RX ORDER — OMEPRAZOLE 20 MG/1
TABLET, DELAYED RELEASE ORAL
COMMUNITY
End: 2020-11-04 | Stop reason: SDUPTHER

## 2020-06-29 RX ORDER — SODIUM CHLORIDE 0.9 % (FLUSH) 0.9 %
10 SYRINGE (ML) INJECTION AS NEEDED
Status: DISCONTINUED | OUTPATIENT
Start: 2020-06-29 | End: 2020-06-29 | Stop reason: HOSPADM

## 2020-06-29 RX ORDER — LEVOTHYROXINE SODIUM 175 UG/1
TABLET ORAL
COMMUNITY
End: 2020-11-04

## 2020-06-29 RX ORDER — KETOROLAC TROMETHAMINE 10 MG/1
TABLET, FILM COATED ORAL
COMMUNITY
Start: 2020-06-08 | End: 2020-11-04

## 2020-06-29 RX ORDER — ONDANSETRON 2 MG/ML
4 INJECTION INTRAMUSCULAR; INTRAVENOUS ONCE
Status: COMPLETED | OUTPATIENT
Start: 2020-06-29 | End: 2020-06-29

## 2020-06-29 RX ORDER — PREGABALIN 50 MG/1
50 CAPSULE ORAL 3 TIMES DAILY
COMMUNITY
Start: 2020-06-23 | End: 2020-11-04

## 2020-06-29 RX ORDER — NITROFURANTOIN 25; 75 MG/1; MG/1
100 CAPSULE ORAL 2 TIMES DAILY
Qty: 14 CAPSULE | Refills: 0 | Status: SHIPPED | OUTPATIENT
Start: 2020-06-29 | End: 2020-07-06

## 2020-06-29 RX ORDER — TIZANIDINE 4 MG/1
TABLET ORAL
COMMUNITY
Start: 2020-06-15 | End: 2020-11-04

## 2020-06-29 RX ORDER — METHOCARBAMOL 750 MG/1
TABLET ORAL
COMMUNITY
Start: 2020-05-24 | End: 2020-11-04 | Stop reason: SDUPTHER

## 2020-06-29 RX ORDER — BUPROPION HYDROCHLORIDE 150 MG/1
TABLET ORAL
COMMUNITY
Start: 2020-04-25 | End: 2020-11-04

## 2020-06-29 RX ORDER — CLONAZEPAM 0.5 MG/1
0.5 TABLET ORAL 2 TIMES DAILY PRN
COMMUNITY
Start: 2020-05-05 | End: 2020-11-04 | Stop reason: SDUPTHER

## 2020-06-29 RX ORDER — HYDROMORPHONE HYDROCHLORIDE 1 MG/ML
0.5 INJECTION, SOLUTION INTRAMUSCULAR; INTRAVENOUS; SUBCUTANEOUS ONCE
Status: COMPLETED | OUTPATIENT
Start: 2020-06-29 | End: 2020-06-29

## 2020-06-29 RX ADMIN — ONDANSETRON HYDROCHLORIDE 4 MG: 2 SOLUTION INTRAMUSCULAR; INTRAVENOUS at 16:35

## 2020-06-29 RX ADMIN — SODIUM CHLORIDE 1000 ML: 9 INJECTION, SOLUTION INTRAVENOUS at 16:38

## 2020-06-29 RX ADMIN — HYDROMORPHONE HYDROCHLORIDE 0.5 MG: 1 INJECTION, SOLUTION INTRAMUSCULAR; INTRAVENOUS; SUBCUTANEOUS at 16:35

## 2020-06-29 NOTE — PROGRESS NOTES
"Kimberly Morgan is a 44 y.o. female worked into the office today for evaluation of flank pain.  Last night she had the acute onset of right mid back pain that has been slowly progressing around the right flank.  Her pain is worsening today and associated with urinary frequency.  She denies fevers or chills, but has had difficulty with urination over the past couple of weeks..  She was seen in the emergency room in [place] overnight and has brought records with her.  There she was given some pain medicine which provided temporary mild relief.    Visit Vitals  /74 (BP Location: Left arm, Patient Position: Sitting)   Ht 165.1 cm (65\")   Wt 78.5 kg (173 lb)   LMP 06/01/2020 (Approximate)   BMI 28.79 kg/m²     Pleasant female appears to be in moderate pain  Mood and affect normal  Breathing unlabored  There is some right flank tenderness but no lower abdominal tenderness to palpation    I have reviewed records from the emergency room last night.  White blood cell count is 13 and hemoglobin is normal.  Metabolic panel and urinalysis were normal.  A CT scan showed no acute findings and no pelvic abnormalities.    A dipstick urinalysis in the office today shows 5-10 RBCs, negative leukocytes, and negative nitrites    Assessment: Right flank pain    I suspect that she has a kidney stone although she has a history of pyelonephritis.  I have offered a trial of outpatient management, but she feels that her pain level is too severe.  Therefore I referred her to the emergency room for further evaluation.      "

## 2020-07-01 LAB — BACTERIA SPEC AEROBE CULT: ABNORMAL

## 2020-08-17 PROBLEM — E89.0 POSTABLATIVE HYPOTHYROIDISM: Status: ACTIVE | Noted: 2020-08-17

## 2020-08-17 PROBLEM — F41.9 ANXIETY: Status: ACTIVE | Noted: 2020-08-17

## 2020-08-17 PROBLEM — Z72.0 TOBACCO ABUSE: Status: ACTIVE | Noted: 2020-08-17

## 2020-08-18 ENCOUNTER — OFFICE VISIT (OUTPATIENT)
Dept: INTERNAL MEDICINE | Age: 45
End: 2020-08-18
Payer: MEDICAID

## 2020-08-18 VITALS
BODY MASS INDEX: 28.76 KG/M2 | WEIGHT: 172.6 LBS | HEART RATE: 104 BPM | DIASTOLIC BLOOD PRESSURE: 60 MMHG | HEIGHT: 65 IN | SYSTOLIC BLOOD PRESSURE: 106 MMHG | OXYGEN SATURATION: 96 %

## 2020-08-18 PROCEDURE — G8427 DOCREV CUR MEDS BY ELIG CLIN: HCPCS | Performed by: INTERNAL MEDICINE

## 2020-08-18 PROCEDURE — 99213 OFFICE O/P EST LOW 20 MIN: CPT | Performed by: INTERNAL MEDICINE

## 2020-08-18 PROCEDURE — 96160 PT-FOCUSED HLTH RISK ASSMT: CPT | Performed by: INTERNAL MEDICINE

## 2020-08-18 PROCEDURE — G8431 POS CLIN DEPRES SCRN F/U DOC: HCPCS | Performed by: INTERNAL MEDICINE

## 2020-08-18 PROCEDURE — G8419 CALC BMI OUT NRM PARAM NOF/U: HCPCS | Performed by: INTERNAL MEDICINE

## 2020-08-18 PROCEDURE — 4004F PT TOBACCO SCREEN RCVD TLK: CPT | Performed by: INTERNAL MEDICINE

## 2020-08-18 RX ORDER — CLONAZEPAM 0.5 MG/1
0.5 TABLET ORAL NIGHTLY
COMMUNITY
End: 2020-09-03 | Stop reason: SDUPTHER

## 2020-08-18 RX ORDER — HYDROXYZINE PAMOATE 25 MG/1
25 CAPSULE ORAL 3 TIMES DAILY PRN
Qty: 30 CAPSULE | Refills: 3 | Status: SHIPPED | OUTPATIENT
Start: 2020-08-18 | End: 2020-09-17

## 2020-08-18 RX ORDER — ATOMOXETINE 100 MG/1
100 CAPSULE ORAL DAILY
COMMUNITY

## 2020-08-18 RX ORDER — LIOTHYRONINE SODIUM 5 UG/1
5 TABLET ORAL DAILY
COMMUNITY

## 2020-08-18 RX ORDER — PREGABALIN 75 MG/1
75 CAPSULE ORAL 3 TIMES DAILY
COMMUNITY

## 2020-08-18 RX ORDER — PAROXETINE HYDROCHLORIDE 20 MG/1
10 TABLET, FILM COATED ORAL EVERY MORNING
COMMUNITY
End: 2020-08-19 | Stop reason: SDUPTHER

## 2020-08-18 RX ORDER — CYCLOBENZAPRINE HCL 10 MG
10 TABLET ORAL 3 TIMES DAILY PRN
COMMUNITY

## 2020-08-18 SDOH — HEALTH STABILITY: MENTAL HEALTH: HOW OFTEN DO YOU HAVE A DRINK CONTAINING ALCOHOL?: MONTHLY OR LESS

## 2020-08-18 ASSESSMENT — PATIENT HEALTH QUESTIONNAIRE - PHQ9
SUM OF ALL RESPONSES TO PHQ QUESTIONS 1-9: 21
7. TROUBLE CONCENTRATING ON THINGS, SUCH AS READING THE NEWSPAPER OR WATCHING TELEVISION: 3
SUM OF ALL RESPONSES TO PHQ9 QUESTIONS 1 & 2: 3
10. IF YOU CHECKED OFF ANY PROBLEMS, HOW DIFFICULT HAVE THESE PROBLEMS MADE IT FOR YOU TO DO YOUR WORK, TAKE CARE OF THINGS AT HOME, OR GET ALONG WITH OTHER PEOPLE: 0
SUM OF ALL RESPONSES TO PHQ9 QUESTIONS 1 & 2: 6
6. FEELING BAD ABOUT YOURSELF - OR THAT YOU ARE A FAILURE OR HAVE LET YOURSELF OR YOUR FAMILY DOWN: 3
1. LITTLE INTEREST OR PLEASURE IN DOING THINGS: 3
7. TROUBLE CONCENTRATING ON THINGS, SUCH AS READING THE NEWSPAPER OR WATCHING TELEVISION: 1
3. TROUBLE FALLING OR STAYING ASLEEP: 3
8. MOVING OR SPEAKING SO SLOWLY THAT OTHER PEOPLE COULD HAVE NOTICED. OR THE OPPOSITE, BEING SO FIGETY OR RESTLESS THAT YOU HAVE BEEN MOVING AROUND A LOT MORE THAN USUAL: 0
4. FEELING TIRED OR HAVING LITTLE ENERGY: 3
3. TROUBLE FALLING OR STAYING ASLEEP: 0
SUM OF ALL RESPONSES TO PHQ QUESTIONS 1-9: 11
9. THOUGHTS THAT YOU WOULD BE BETTER OFF DEAD, OR OF HURTING YOURSELF: 0
2. FEELING DOWN, DEPRESSED OR HOPELESS: 1
6. FEELING BAD ABOUT YOURSELF - OR THAT YOU ARE A FAILURE OR HAVE LET YOURSELF OR YOUR FAMILY DOWN: 3
10. IF YOU CHECKED OFF ANY PROBLEMS, HOW DIFFICULT HAVE THESE PROBLEMS MADE IT FOR YOU TO DO YOUR WORK, TAKE CARE OF THINGS AT HOME, OR GET ALONG WITH OTHER PEOPLE: 1
1. LITTLE INTEREST OR PLEASURE IN DOING THINGS: 2
8. MOVING OR SPEAKING SO SLOWLY THAT OTHER PEOPLE COULD HAVE NOTICED. OR THE OPPOSITE, BEING SO FIGETY OR RESTLESS THAT YOU HAVE BEEN MOVING AROUND A LOT MORE THAN USUAL: 0
4. FEELING TIRED OR HAVING LITTLE ENERGY: 1
9. THOUGHTS THAT YOU WOULD BE BETTER OFF DEAD, OR OF HURTING YOURSELF: 0
2. FEELING DOWN, DEPRESSED OR HOPELESS: 3
5. POOR APPETITE OR OVEREATING: 3
SUM OF ALL RESPONSES TO PHQ QUESTIONS 1-9: 11
5. POOR APPETITE OR OVEREATING: 3
SUM OF ALL RESPONSES TO PHQ QUESTIONS 1-9: 21

## 2020-08-18 ASSESSMENT — ENCOUNTER SYMPTOMS
DIARRHEA: 0
CONSTIPATION: 0
ABDOMINAL PAIN: 0
SINUS PAIN: 0
CHEST TIGHTNESS: 0
SHORTNESS OF BREATH: 0
WHEEZING: 0
VOMITING: 0
BLOOD IN STOOL: 0
COUGH: 0
TROUBLE SWALLOWING: 0
BACK PAIN: 0
RHINORRHEA: 0

## 2020-08-18 NOTE — PROGRESS NOTES
On the basis of positive PHQ-9 screening (PHQ-9 Total Score: 21), the following plan was implemented: medication prescribed: Paxil- 10 - patient will call for any significant medication side effects or worsening symptoms of depression. Patient will follow-up in 3 month(s) with PCP.      [facility]  [address] [place] [address], Juancho Blackman  [address] [address]  Phone: [phone]  Fax: [phone]    Visit Date:  2020    SUBJECTIVE:     Alisha Hillman is a 39 y.o. female presents today in the office for:    Chief Complaint   Patient presents with    [facility]    Fatigue       HPI  71-year-old female presents as a new patient visit  Long history of ADHD was followed by a nurse practitioner in [facility] purchase is switching to  she was previously on methylphenidate and she is currently on Strattera for ADHD  Patient has a long history of fibromyalgia managed by chronic pain  Anxiety and depression, she is a single mother with girls very petrified about her current pandemic and she worries a lot  She is intolerant to the facemask and she has lost her job at [employer]  She offers complaints of fatigue denies any myalgias has sleep problems but is able to sleep with her Klonopin she denies any chest pain shortness of breath moves her bowels once a week gets her normal always has been she denies any blood in her stools no weaknesses of her arms or legs no headaches or blurred vision        Past Medical History:   Diagnosis Date    Depression     Fibromyalgia     Hx of Graves' disease     Thyroid disease        Past Surgical History:   Procedure Laterality Date    CARPAL TUNNEL RELEASE       SECTION      CHOLECYSTECTOMY      DILATION AND CURETTAGE OF UTERUS      x 2    HC INJECTION PROCEDURE FOR SACROILIAC JOINT Right 2017    SACROILIAC JOINT INJECTION performed by Alaina Saleh at [address] [place] [address] Right 2019    SACROILIAC JOINT INJECTION performed by Abhinav Garibay at [address]  tube removal    TONSILLECTOMY      WISDOM TOOTH EXTRACTION         Social History     Socioeconomic History    Marital status:      Spouse name: Not on file    Number of children: Not on file    Years of education: Not on file    Highest education level: Not on file   Occupational History    Not on file   Social Needs    Financial resource strain: Not on file    Food insecurity     Worry: Not on file     Inability: Not on file    Transportation needs     Medical: Not on file     Non-medical: Not on file   Tobacco Use    Smoking status: Current Every Day Smoker     Packs/day: 0.50     Types: Cigarettes     Start date: 1992    Smokeless tobacco: Never Used   Substance and Sexual Activity    Alcohol use: Yes     Frequency: Monthly or less     Binge frequency: Less than monthly     Comment: rarely    Drug use: No    Sexual activity: Not on file   Lifestyle    Physical activity     Days per week: Not on file     Minutes per session: Not on file    Stress: Not on file   Relationships    Social connections     Talks on phone: Not on file     Gets together: Not on file     Attends Jainism service: Not on file     Active member of club or organization: Not on file     Attends meetings of clubs or organizations: Not on file     Relationship status: Not on file    Intimate partner violence     Fear of current or ex partner: Not on file     Emotionally abused: Not on file     Physically abused: Not on file     Forced sexual activity: Not on file   Other Topics Concern    Not on file   Social History Narrative    Not on file       Family History   Problem Relation Age of Onset    Other Father         Heart stents       No Known Allergies    Current Outpatient Medications   Medication Sig Dispense Refill    atomoxetine (STRATTERA) 100 MG capsule Take 100 mg by mouth daily      Naproxen Sodium (ALEVE PO) Take by mouth      liothyronine (CYTOMEL) 5 MCG tablet Take 5 mcg by mouth daily      PARoxetine (PAXIL) 20 MG tablet Take 10 mg by mouth every morning      pregabalin (LYRICA) 75 MG capsule Take 75 mg by mouth 3 times daily.  cyclobenzaprine (FLEXERIL) 10 MG tablet Take 10 mg by mouth 3 times daily as needed      clonazePAM (KLONOPIN) 0.5 MG tablet Take 0.5 mg by mouth nightly.  hydrOXYzine (VISTARIL) 25 MG capsule Take 1 capsule by mouth 3 times daily as needed for Anxiety 30 capsule 3    levothyroxine (SYNTHROID) 175 MCG tablet TAKE 1 TABLET ON AN EMPTY STOMACH IN THE MORNING FOR 30 DAYS  6     No current facility-administered medications for this visit. Patient Active Problem List   Diagnosis    Postablative hypothyroidism    Anxiety    Tobacco abuse             Review of Systems:   Review of Systems   Constitutional: Positive for fatigue. Negative for activity change, chills and fever. HENT: Negative for hearing loss, postnasal drip, rhinorrhea, sinus pain and trouble swallowing. Eyes: Negative for visual disturbance. Respiratory: Negative for cough, chest tightness, shortness of breath and wheezing. Cardiovascular: Negative for chest pain, palpitations and leg swelling. Gastrointestinal: Negative for abdominal pain, blood in stool, constipation, diarrhea and vomiting. Endocrine: Negative for cold intolerance. Genitourinary: Negative for frequency and urgency. Musculoskeletal: Negative for arthralgias, back pain and myalgias. Allergic/Immunologic: Negative for environmental allergies. Neurological: Negative for light-headedness, numbness and headaches. Psychiatric/Behavioral: Positive for decreased concentration and sleep disturbance. The patient is hyperactive. OBJECTIVE:     Physical Exam:    Physical Exam  Vitals signs and nursing note reviewed. Constitutional:       General: She is not in acute distress.      Appearance: She is obese.   HENT:      Head: Normocephalic. Right Ear: External ear normal.      Left Ear: External ear normal.      Nose: Nose normal.   Eyes:      General: No scleral icterus. Conjunctiva/sclera: Conjunctivae normal.      Pupils: Pupils are equal, round, and reactive to light. Neck:      Musculoskeletal: Normal range of motion and neck supple. Thyroid: No thyromegaly. Vascular: No JVD. Cardiovascular:      Rate and Rhythm: Normal rate and regular rhythm. Pulses: Normal pulses. Heart sounds: Normal heart sounds, S1 normal and S2 normal. No murmur. Pulmonary:      Effort: Pulmonary effort is normal. No respiratory distress. Breath sounds: Normal breath sounds and air entry. No wheezing or rales. Abdominal:      General: Bowel sounds are normal. There is no distension. Palpations: Abdomen is soft. Tenderness: There is no abdominal tenderness. Musculoskeletal: Normal range of motion. General: No deformity. Right lower leg: No edema. Left lower leg: No edema. Lymphadenopathy:      Cervical: No cervical adenopathy. Skin:     General: Skin is warm and dry. Findings: No rash. Neurological:      General: No focal deficit present. Mental Status: She is alert and oriented to person, place, and time. Cranial Nerves: Cranial nerves are intact. No cranial nerve deficit. Sensory: Sensation is intact. Motor: Motor function is intact. Coordination: Coordination is intact. Gait: Gait is intact. Deep Tendon Reflexes: Reflexes normal.   Psychiatric:         Attention and Perception: Attention normal.         Mood and Affect: Mood is anxious. Speech: Speech normal.         Behavior: Behavior normal.         Thought Content:  Thought content normal.         Judgment: Judgment normal.       Hospital Outpatient Visit on 10/20/2014   Component Date Value Ref Range Status    WBC 10/20/2014 6.94  4.8 - 10.8 TH/MM3 Final  RBC 10/20/2014 4.28  4.2 - 5.4 MIL/uL Final    Hemoglobin 10/20/2014 13.4  12.0 - 16.0 G/DL Final    Hematocrit 10/20/2014 39.8  37 - 47 % Final    MCV 10/20/2014 93.0  81 - 99 FL Final    MCH 10/20/2014 31.3* 27 - 31 PG Final    MCHC 10/20/2014 33.7  33 - 37 G/DL Final    RDW 10/20/2014 12.8  11.0 - 14.0 % Final    Platelets 97/35/2134 196  130 - 400 TH/MM3 Final    MPV 10/20/2014 10.9  10.2 - 13.2 FL Final    Neutrophils Absolute 10/20/2014 2.81  1.5 - 7.5 TH/MM3 Final    Lymphocytes 10/20/2014 3.32  1.1 - 4.5 TH/MM3 Final    Monocytes 10/20/2014 0.67  0.0 - 0.9 TH/MM3 Final    Eosinophils % 10/20/2014 0.10  0.0 - 0.60 TH/MM3 Final    Basophils 10/20/2014 0.04  0.0 - 0.20 TH/MM3 Final    Neutrophils % 10/20/2014 40.5* 50 - 70 % Final    Lymphocytes 10/20/2014 47.8* 20 - 40 % Final    Monocytes % 10/20/2014 9.7  0 - 10 % Final    Eosinophils % 10/20/2014 1.4  1 - 5 % Final    Basophils % 10/20/2014 0.6  0 - 1 % Final    Protime 10/20/2014 12.1  11.6 - 14.2 SEC Final    INR 10/20/2014 0.92  0.87 - 1.14 Final    Comment: INR  < or = 1.3  Normal  INR = 2.0 - 3.0  Therapeutic  INR = 2.5 - 3.5  Therapeutic for patients with mechanical                   prosthetic heart valve  MI prophylaxis  & MI prophylaxisINR  > or = 3.5  Abnormal/Elevated  INR  > or = 5.0  Critical (requires immediate physician                   notification)      PTT 10/20/2014 29.2  20.4 - 37.1 SEC Final    D-Dimer, Quant 10/20/2014 0.28  0 - 0.48 UG/FEUs Final    Comment: Interpretation:     D-DIMER < 0.48 UG/FEUs     Thrombosis or Disseminated                             Intravascular Coagulation is                             unlikely. D-DIMER >OR = 0.48 UG/FEUs Thrombosis or Disseminated                             Intravascular Coagulation cannot                             be ruled out.       Calcium 10/20/2014 9.4  8.6 - 10.0 MG/DL Final    Glucose 10/20/2014 93  74 - 109 MG/DL Final    Comment: NO REFERENCE interaction performed with straight Lyrica Wellbutrin and paroxetine, SSRIs increase levels of Strattera which could be worsening her anxiety disorder since patient needs Strattera for concentration no dose adjustments will be made however dose of Paxil will be reduced and Wellbutrin will not be restarted she will continue to obtain her Lyrica from her pain management we will be prescribing her Strattera and Klonopin expectations were set very early about fatigue and fibromyalgia patient assumed that Strattera would reduce to reduce her fatigue however it is part of her the spectrum of chronic fatigue syndrome/fibromyalgia    Patient was advised to increase physical activity, consume a low calorie diet attempt to lose weight   For anxiety disorder she was advised psychotherapy states that she has a friend she talks to and she would be comfortable with that advised that she needs cognitive behavioral therapy she will think about this generalized anxiety disorder  No flowsheet data found.   Interpretation of ELIESER-7 score: 14 = moderate anxiety    PLAN:        Medications Ordered:  Orders Placed This Encounter   Medications    hydrOXYzine (VISTARIL) 25 MG capsule     Sig: Take 1 capsule by mouth 3 times daily as needed for Anxiety     Dispense:  30 capsule     Refill:  3       Other Orders Placed:  Orders Placed This Encounter   Procedures    T4, Free     Standing Status:   Future     Standing Expiration Date:   11/18/2020    TSH without Reflex     Standing Status:   Future     Standing Expiration Date:   11/18/2020    Vitamin D 25 Hydroxy     Standing Status:   Future     Standing Expiration Date:   11/18/2020    Comprehensive Metabolic Panel     Standing Status:   Future     Standing Expiration Date:   11/18/2020    Lipid Panel     Standing Status:   Future     Standing Expiration Date:   11/18/2020     Order Specific Question:   Is Patient Fasting?/# of Hours     Answer:   yes    CBC Auto Differential Standing Status:   Future     Standing Expiration Date:   11/18/2020    Hepatitis C Antibody     Standing Status:   Future     Standing Expiration Date:   11/18/2020    HIV Screen     Standing Status:   Future     Standing Expiration Date:   11/18/2020   [address] [facility], [facility]al [facility], [place]     Standing Status:   Future     Standing Expiration Date:   11/18/2020     Referral Priority:   Routine     Referral Type:   Eval and Treat     Referral Reason:   Specialty Services Required     Referred to Provider:   Lillian Mccall     Requested Specialty:   Licensed Clinical      Number of Visits Requested:   1       Return in about 3 months (around 11/18/2020).            Electronically signed by Abrahan Beaver MD on 8/18/2020 at 9:03 AM

## 2020-08-19 ENCOUNTER — TELEPHONE (OUTPATIENT)
Dept: INTERNAL MEDICINE | Age: 45
End: 2020-08-19

## 2020-08-19 RX ORDER — PAROXETINE 10 MG/1
10 TABLET, FILM COATED ORAL EVERY MORNING
Qty: 30 TABLET | Refills: 5 | Status: SHIPPED | OUTPATIENT
Start: 2020-08-19 | End: 2020-09-18

## 2020-08-19 NOTE — TELEPHONE ENCOUNTER
Called the pt and let her know that the 10mg Paxil has been sent in and that she can use the Vistaril as needed. She voice understood.

## 2020-08-19 NOTE — TELEPHONE ENCOUNTER
Patient called she said that she is confused about her medications. She thought that you where going to change her Paxil, but when she went to the pharmacy the hydroxyzine is what was changed.

## 2020-08-20 ENCOUNTER — TELEPHONE (OUTPATIENT)
Dept: INTERNAL MEDICINE | Age: 45
End: 2020-08-20

## 2020-08-20 RX ORDER — OMEPRAZOLE 10 MG/1
10 CAPSULE, DELAYED RELEASE ORAL DAILY
Qty: 30 CAPSULE | Refills: 3 | Status: SHIPPED | OUTPATIENT
Start: 2020-08-20

## 2020-08-20 NOTE — TELEPHONE ENCOUNTER
Pt called she wanted to know what she can take for acid reflux she said that she gets burning all in her chest and she belches up the taste of medication. She has tried alkaosesor and it is not helping.

## 2020-08-20 NOTE — TELEPHONE ENCOUNTER
Sent in the prilosec to the pharmacy, pt wants to know if there is one of her medications that can cause the acid reflux she said she I afraid to take them that the acid reflux will happen again.

## 2020-08-25 PROBLEM — N39.0 UTI (URINARY TRACT INFECTION): Status: ACTIVE | Noted: 2020-08-25

## 2020-08-25 NOTE — PROGRESS NOTES
[facility] INTERNAL MEDICINE  [address] [facility] [address]  [address] [address]  Phone: [phone]  Fax: [phone]    Visit Date:  2020    SUBJECTIVE:     Lulú Unger is a 39 y.o. female presents today in the office for:    Chief Complaint   Patient presents with    Urinary Tract Infection     also wants to change ADHD med       HPI  79-year-old female presents as UCV  Called yesterday states she has dysuria, arrived today stating she would like her ADHD medications adjusted  Long history of ADHD was followed by a nurse practitioner in [facility] purchase is switching to us she was previously on methylphenidate and she is currently on Strattera for ADHD, want to switch to Vyvance  Patient has a long history of fibromyalgia managed by chronic pain  Anxiety and depression, she is a single mother with girls very petrified about her current pandemic and she worries a lot  She is intolerant to the facemask and she has lost her job at [employer]  She offers complaints of fatigue denies any myalgias has sleep problems but is able to sleep with her Klonopin she denies any chest pain shortness of breath moves her bowels once a week gets her normal always has been she denies any blood in her stools no weaknesses of her arms or legs no headaches or blurred vision     Past Medical History:   Diagnosis Date    Depression     Fibromyalgia     Hx of Graves' disease     Thyroid disease        Past Surgical History:   Procedure Laterality Date    CARPAL TUNNEL RELEASE       SECTION      CHOLECYSTECTOMY      DILATION AND CURETTAGE OF UTERUS      x 2    HC INJECTION PROCEDURE FOR SACROILIAC JOINT Right 2017    SACROILIAC JOINT INJECTION performed by Jarocho Mendosa at [address] Right 2019    SACROILIAC JOINT INJECTION performed by Berl Sever at [address] tube removal    TONSILLECTOMY  WISDOM TOOTH EXTRACTION         Social History     Socioeconomic History    Marital status:      Spouse name: Not on file    Number of children: Not on file    Years of education: Not on file    Highest education level: Not on file   Occupational History    Not on file   Social Needs    Financial resource strain: Not on file    Food insecurity     Worry: Not on file     Inability: Not on file    Transportation needs     Medical: Not on file     Non-medical: Not on file   Tobacco Use    Smoking status: Current Every Day Smoker     Packs/day: 0.50     Types: Cigarettes     Start date: 12    Smokeless tobacco: Never Used   Substance and Sexual Activity    Alcohol use: Yes     Frequency: Monthly or less     Binge frequency: Less than monthly     Comment: rarely    Drug use: No    Sexual activity: Not on file   Lifestyle    Physical activity     Days per week: Not on file     Minutes per session: Not on file    Stress: Not on file   Relationships    Social connections     Talks on phone: Not on file     Gets together: Not on file     Attends Oriental orthodox service: Not on file     Active member of club or organization: Not on file     Attends meetings of clubs or organizations: Not on file     Relationship status: Not on file    Intimate partner violence     Fear of current or ex partner: Not on file     Emotionally abused: Not on file     Physically abused: Not on file     Forced sexual activity: Not on file   Other Topics Concern    Not on file   Social History Narrative    Not on file       Family History   Problem Relation Age of Onset    Other Father         Heart stents       No Known Allergies    Current Outpatient Medications   Medication Sig Dispense Refill    amphetamine-dextroamphetamine (ADDERALL, 10MG,) 10 MG tablet Take 1 tablet by mouth daily for 30 days.  30 tablet 0    omeprazole (PRILOSEC) 10 MG delayed release capsule Take 1 capsule by mouth daily 30 capsule 3    PARoxetine (PAXIL) 10 MG tablet Take 1 tablet by mouth every morning 30 tablet 5    atomoxetine (STRATTERA) 100 MG capsule Take 100 mg by mouth daily      Naproxen Sodium (ALEVE PO) Take by mouth      liothyronine (CYTOMEL) 5 MCG tablet Take 5 mcg by mouth daily      pregabalin (LYRICA) 75 MG capsule Take 75 mg by mouth 3 times daily.  cyclobenzaprine (FLEXERIL) 10 MG tablet Take 10 mg by mouth 3 times daily as needed      clonazePAM (KLONOPIN) 0.5 MG tablet Take 0.5 mg by mouth nightly.  hydrOXYzine (VISTARIL) 25 MG capsule Take 1 capsule by mouth 3 times daily as needed for Anxiety 30 capsule 3    levothyroxine (SYNTHROID) 175 MCG tablet TAKE 1 TABLET ON AN EMPTY STOMACH IN THE MORNING FOR 30 DAYS  6     No current facility-administered medications for this visit. Patient Active Problem List   Diagnosis    Postablative hypothyroidism    Anxiety    Tobacco abuse    UTI (urinary tract infection)    Attention deficit hyperactivity disorder (ADHD)             Review of Systems:   Review of Systems   Genitourinary: Positive for dysuria. Psychiatric/Behavioral: Positive for decreased concentration. OBJECTIVE:     Physical Exam:    Physical Exam  Constitutional:       General: She is not in acute distress. Eyes:      Conjunctiva/sclera: Conjunctivae normal.      Pupils: Pupils are equal, round, and reactive to light. Cardiovascular:      Rate and Rhythm: Normal rate and regular rhythm. Heart sounds: Normal heart sounds. Pulmonary:      Effort: Pulmonary effort is normal.      Breath sounds: Normal breath sounds. Abdominal:      General: Abdomen is flat. Bowel sounds are normal.      Palpations: Abdomen is soft. Neurological:      General: No focal deficit present. Mental Status: She is alert.        Hospital Outpatient Visit on 10/20/2014   Component Date Value Ref Range Status    WBC 10/20/2014 6.94  4.8 - 10.8 TH/MM3 Final    RBC 10/20/2014 4.28  4.2 - 5.4 MIL/uL Final    Hemoglobin 10/20/2014 13.4  12.0 - 16.0 G/DL Final    Hematocrit 10/20/2014 39.8  37 - 47 % Final    MCV 10/20/2014 93.0  81 - 99 FL Final    MCH 10/20/2014 31.3* 27 - 31 PG Final    MCHC 10/20/2014 33.7  33 - 37 G/DL Final    RDW 10/20/2014 12.8  11.0 - 14.0 % Final    Platelets 99/03/0876 196  130 - 400 TH/MM3 Final    MPV 10/20/2014 10.9  10.2 - 13.2 FL Final    Neutrophils Absolute 10/20/2014 2.81  1.5 - 7.5 TH/MM3 Final    Lymphocytes 10/20/2014 3.32  1.1 - 4.5 TH/MM3 Final    Monocytes 10/20/2014 0.67  0.0 - 0.9 TH/MM3 Final    Eosinophils % 10/20/2014 0.10  0.0 - 0.60 TH/MM3 Final    Basophils 10/20/2014 0.04  0.0 - 0.20 TH/MM3 Final    Neutrophils % 10/20/2014 40.5* 50 - 70 % Final    Lymphocytes 10/20/2014 47.8* 20 - 40 % Final    Monocytes % 10/20/2014 9.7  0 - 10 % Final    Eosinophils % 10/20/2014 1.4  1 - 5 % Final    Basophils % 10/20/2014 0.6  0 - 1 % Final    Protime 10/20/2014 12.1  11.6 - 14.2 SEC Final    INR 10/20/2014 0.92  0.87 - 1.14 Final    Comment: INR  < or = 1.3  Normal  INR = 2.0 - 3.0  Therapeutic  INR = 2.5 - 3.5  Therapeutic for patients with mechanical                   prosthetic heart valve  MI prophylaxis  & MI prophylaxisINR  > or = 3.5  Abnormal/Elevated  INR  > or = 5.0  Critical (requires immediate physician                   notification)      PTT 10/20/2014 29.2  20.4 - 37.1 SEC Final    D-Dimer, Quant 10/20/2014 0.28  0 - 0.48 UG/FEUs Final    Comment: Interpretation:     D-DIMER < 0.48 UG/FEUs     Thrombosis or Disseminated                             Intravascular Coagulation is                             unlikely. D-DIMER >OR = 0.48 UG/FEUs Thrombosis or Disseminated                             Intravascular Coagulation cannot                             be ruled out.  Calcium 10/20/2014 9.4  8.6 - 10.0 MG/DL Final    Glucose 10/20/2014 93  74 - 109 MG/DL Final    Comment: NO REFERENCE RANGE CAN BE GIVEN.   THE RESULT OF A RANDOM  PLASMA/SERUM GLUCOSE DEPENDS ON THE TIMING AND CONTENTS OF  THE LAST FOOD INTAKE.  CREATININE 10/20/2014 0.6  0.50 - 0.90 MG/DL Final    Gfr Calculated 10/20/2014 118  59 - 300 mL/min Final    BUN 10/20/2014 11  6 - 20 MG/DL Final    Total Protein 10/20/2014 7.5  6.6 - 8.7 G/DL Final    Alb 10/20/2014 4.4  3.5 - 5.2 G/DL Final    Total Bilirubin 10/20/2014 0.3  0.3 - 1.2 MG/DL Final    Alkaline Phosphatase 10/20/2014 70  35 - 104 U/L Final    AST 10/20/2014 20  5 - 32 U/L Final    Sodium 10/20/2014 138  136 - 145 mmol/L Final    Potassium 10/20/2014 4.3  3.5 - 5.0 mmol/L Final    Chloride 10/20/2014 98  98 - 111 mmol/L Final    CO2 10/20/2014 28  22 - 29 mmol/L Final    ALT 10/20/2014 16  5 - 33 U/L Final    Anion Gap 10/20/2014 12  7 - 19 mmol/L Final    POC Troponin I 10/20/2014 0.00* 0.000 - 0.08 NG/ML Final    Comment: Due to methodology differences, Troponin iSTAT results must  not be compared to Troponin T results.  POC Troponin I 10/20/2014 0.00* 0.000 - 0.08 NG/ML Final    Comment: Due to methodology differences, Troponin iSTAT results must  not be compared to Troponin T results.  HCG(Serum) Pregnancy Test 10/20/2014 NEGATIVE   Final       ASSESSMENT:      Diagnosis Orders   1. Attention deficit hyperactivity disorder (ADHD), unspecified ADHD type   She will be started on Ritalin  D/c straterra amphetamine-dextroamphetamine (ADDERALL, 10MG,) 10 MG tablet   2. Urinary tract infection without hematuria, site unspecified   Advised to submiturine        PLAN:        Medications Ordered:  Orders Placed This Encounter   Medications    amphetamine-dextroamphetamine (ADDERALL, 10MG,) 10 MG tablet     Sig: Take 1 tablet by mouth daily for 30 days. Dispense:  30 tablet     Refill:  0       Other Orders Placed:  No orders of the defined types were placed in this encounter. Return in about 3 months (around 11/26/2020).            Electronically signed by Iza Singh MD on 8/26/2020 at 9:21 AM

## 2020-08-26 ENCOUNTER — OFFICE VISIT (OUTPATIENT)
Dept: INTERNAL MEDICINE | Age: 45
End: 2020-08-26
Payer: MEDICAID

## 2020-08-26 VITALS
BODY MASS INDEX: 28.82 KG/M2 | SYSTOLIC BLOOD PRESSURE: 138 MMHG | WEIGHT: 173 LBS | OXYGEN SATURATION: 97 % | HEART RATE: 85 BPM | DIASTOLIC BLOOD PRESSURE: 80 MMHG | HEIGHT: 65 IN

## 2020-08-26 DIAGNOSIS — M79.7 FIBROMYALGIA: ICD-10-CM

## 2020-08-26 DIAGNOSIS — Z13.220 SCREENING FOR LIPID DISORDERS: ICD-10-CM

## 2020-08-26 DIAGNOSIS — Z11.4 SCREENING FOR HIV WITHOUT PRESENCE OF RISK FACTORS: ICD-10-CM

## 2020-08-26 DIAGNOSIS — N39.0 URINARY TRACT INFECTION WITHOUT HEMATURIA, SITE UNSPECIFIED: ICD-10-CM

## 2020-08-26 DIAGNOSIS — E55.9 HYPOVITAMINOSIS D: ICD-10-CM

## 2020-08-26 DIAGNOSIS — E89.0 POSTABLATIVE HYPOTHYROIDISM: ICD-10-CM

## 2020-08-26 DIAGNOSIS — Z11.59 ENCOUNTER FOR HEPATITIS C SCREENING TEST FOR LOW RISK PATIENT: ICD-10-CM

## 2020-08-26 DIAGNOSIS — F41.9 ANXIETY: ICD-10-CM

## 2020-08-26 PROBLEM — F90.9 ATTENTION DEFICIT HYPERACTIVITY DISORDER (ADHD): Status: ACTIVE | Noted: 2020-08-26

## 2020-08-26 LAB
ALBUMIN SERPL-MCNC: 4.1 G/DL (ref 3.5–5.2)
ALP BLD-CCNC: 72 U/L (ref 35–104)
ALT SERPL-CCNC: 10 U/L (ref 5–33)
ANION GAP SERPL CALCULATED.3IONS-SCNC: 15 MMOL/L (ref 7–19)
AST SERPL-CCNC: 11 U/L (ref 5–32)
BASOPHILS ABSOLUTE: 0.1 K/UL (ref 0–0.2)
BASOPHILS RELATIVE PERCENT: 0.5 % (ref 0–1)
BILIRUB SERPL-MCNC: 0.3 MG/DL (ref 0.2–1.2)
BILIRUBIN URINE: NEGATIVE
BLOOD, URINE: ABNORMAL
BUN BLDV-MCNC: 13 MG/DL (ref 6–20)
CALCIUM SERPL-MCNC: 9.3 MG/DL (ref 8.6–10)
CHLORIDE BLD-SCNC: 103 MMOL/L (ref 98–111)
CHOLESTEROL, TOTAL: 211 MG/DL (ref 160–199)
CLARITY: CLEAR
CO2: 22 MMOL/L (ref 22–29)
COLOR: YELLOW
CREAT SERPL-MCNC: 0.6 MG/DL (ref 0.5–0.9)
EOSINOPHILS ABSOLUTE: 0.1 K/UL (ref 0–0.6)
EOSINOPHILS RELATIVE PERCENT: 1 % (ref 0–5)
GFR AFRICAN AMERICAN: >59
GFR NON-AFRICAN AMERICAN: >60
GLUCOSE BLD-MCNC: 97 MG/DL (ref 74–109)
GLUCOSE URINE: NEGATIVE MG/DL
HCT VFR BLD CALC: 43.4 % (ref 37–47)
HDLC SERPL-MCNC: 36 MG/DL (ref 65–121)
HEMOGLOBIN: 14.2 G/DL (ref 12–16)
HEPATITIS C ANTIBODY INTERPRETATION: NORMAL
HIV-1 P24 AG: NORMAL
IMMATURE GRANULOCYTES #: 0 K/UL
KETONES, URINE: NEGATIVE MG/DL
LDL CHOLESTEROL CALCULATED: 117 MG/DL
LEUKOCYTE ESTERASE, URINE: NEGATIVE
LYMPHOCYTES ABSOLUTE: 3.5 K/UL (ref 1.1–4.5)
LYMPHOCYTES RELATIVE PERCENT: 36.3 % (ref 20–40)
MCH RBC QN AUTO: 29.5 PG (ref 27–31)
MCHC RBC AUTO-ENTMCNC: 32.7 G/DL (ref 33–37)
MCV RBC AUTO: 90 FL (ref 81–99)
MONOCYTES ABSOLUTE: 0.9 K/UL (ref 0–0.9)
MONOCYTES RELATIVE PERCENT: 9.3 % (ref 0–10)
NEUTROPHILS ABSOLUTE: 5.1 K/UL (ref 1.5–7.5)
NEUTROPHILS RELATIVE PERCENT: 52.7 % (ref 50–65)
NITRITE, URINE: NEGATIVE
PDW BLD-RTO: 13 % (ref 11.5–14.5)
PH UA: 6 (ref 5–8)
PLATELET # BLD: 243 K/UL (ref 130–400)
PMV BLD AUTO: 11.4 FL (ref 9.4–12.3)
POTASSIUM SERPL-SCNC: 3.8 MMOL/L (ref 3.5–5)
PROTEIN UA: NEGATIVE MG/DL
RAPID HIV 1&2: NORMAL
RBC # BLD: 4.82 M/UL (ref 4.2–5.4)
SODIUM BLD-SCNC: 140 MMOL/L (ref 136–145)
SPECIFIC GRAVITY UA: 1.02 (ref 1–1.03)
T4 FREE: 1.37 NG/DL (ref 0.93–1.7)
TOTAL PROTEIN: 7.4 G/DL (ref 6.6–8.7)
TRIGL SERPL-MCNC: 289 MG/DL (ref 0–149)
TSH SERPL DL<=0.05 MIU/L-ACNC: 0.05 UIU/ML (ref 0.27–4.2)
UROBILINOGEN, URINE: 0.2 E.U./DL
VITAMIN D 25-HYDROXY: 95.8 NG/ML
WBC # BLD: 9.6 K/UL (ref 4.8–10.8)

## 2020-08-26 PROCEDURE — 99213 OFFICE O/P EST LOW 20 MIN: CPT | Performed by: INTERNAL MEDICINE

## 2020-08-26 RX ORDER — DEXTROAMPHETAMINE SACCHARATE, AMPHETAMINE ASPARTATE, DEXTROAMPHETAMINE SULFATE AND AMPHETAMINE SULFATE 2.5; 2.5; 2.5; 2.5 MG/1; MG/1; MG/1; MG/1
10 TABLET ORAL DAILY
Qty: 30 TABLET | Refills: 0 | Status: SHIPPED | OUTPATIENT
Start: 2020-08-26 | End: 2020-08-26 | Stop reason: ALTCHOICE

## 2020-08-27 ENCOUNTER — TELEPHONE (OUTPATIENT)
Dept: INTERNAL MEDICINE | Age: 45
End: 2020-08-27

## 2020-08-27 RX ORDER — LEVOTHYROXINE SODIUM 137 UG/1
137 TABLET ORAL DAILY
Qty: 90 TABLET | Refills: 1 | Status: SHIPPED | OUTPATIENT
Start: 2020-08-27 | End: 2020-11-25

## 2020-08-27 NOTE — TELEPHONE ENCOUNTER
Pt called back she is agreeable to the medication change, she did ask on her urine what might be the cause of the sudden urgency and when she needs to go she has to force to urinate. I asked her if she had seen a urologist before and she said she had but not for this it was for her kidney stones.

## 2020-08-28 LAB — URINE CULTURE, ROUTINE: NORMAL

## 2020-09-03 RX ORDER — CLONAZEPAM 0.5 MG/1
0.5 TABLET ORAL NIGHTLY PRN
Qty: 30 TABLET | Refills: 0 | Status: SHIPPED | OUTPATIENT
Start: 2020-09-03 | End: 2020-10-08 | Stop reason: SDUPTHER

## 2020-09-03 NOTE — TELEPHONE ENCOUNTER
Yeni Eckert called to request a refill on her medication. Last office visit : 8/26/2020   Next office visit : 11/17/2020     Last UDS: No results found for: Alyssa Wilson, LABBENZ, BUPRENUR, COCAIMETSCRU, GABAPENTIN, MDMA, METAMPU, OPIATESCREENURINE, OXTCOSU, PHENCYCLIDINESCREENURINE, PROPOXYPHENE, THCSCREENUR, TRICYUR    Last Fito: 08/26/2020  Medication Contract: 08/18/2020     Requested Prescriptions     Pending Prescriptions Disp Refills    clonazePAM (KLONOPIN) 0.5 MG tablet 60 tablet 0     Sig: Take 1 tablet by mouth 2 times daily for 30 days. Please approve or refuse this medication.    Radha Zaragoza

## 2020-09-04 ENCOUNTER — HOSPITAL ENCOUNTER (EMERGENCY)
Facility: HOSPITAL | Age: 45
Discharge: HOME | End: 2020-09-04
Admitting: INTERNAL MEDICINE

## 2020-09-04 VITALS
HEIGHT: 65 IN | OXYGEN SATURATION: 98 % | BODY MASS INDEX: 29.32 KG/M2 | TEMPERATURE: 97.8 F | RESPIRATION RATE: 18 BRPM | SYSTOLIC BLOOD PRESSURE: 111 MMHG | DIASTOLIC BLOOD PRESSURE: 64 MMHG | WEIGHT: 176 LBS | HEART RATE: 86 BPM

## 2020-09-04 DIAGNOSIS — L03.115 CELLULITIS OF RIGHT LOWER EXTREMITY: Primary | ICD-10-CM

## 2020-09-04 PROCEDURE — 96372 THER/PROPH/DIAG INJ SC/IM: CPT

## 2020-09-04 PROCEDURE — 99283 EMERGENCY DEPT VISIT LOW MDM: CPT

## 2020-09-04 RX ORDER — CLINDAMYCIN PHOSPHATE 150 MG/ML
600 INJECTION, SOLUTION INTRAVENOUS ONCE
Status: COMPLETED | OUTPATIENT
Start: 2020-09-04 | End: 2020-09-04

## 2020-09-04 RX ADMIN — CLINDAMYCIN PHOSPHATE 600 MG: 150 INJECTION, SOLUTION INTRAVENOUS at 22:46

## 2020-09-05 NOTE — ED PROVIDER NOTES
Subjective   45 yof presents with c/o red raised area to the right upper inner thigh.  She states she believes it is either a spider bite or an infected hair. She denies fever.  She states she was started on bactrim two days ago.  She is concerned the area may not be improving. Small red raised area is present to the right upper inner thigh.  There is a small amount of redness around the area.  There is no fluctuance or drainage.           Review of Systems   Constitutional: Negative for activity change, appetite change, fatigue and fever.   HENT: Negative for congestion, ear pain, facial swelling and sore throat.    Eyes: Negative for discharge and visual disturbance.   Respiratory: Negative for apnea, chest tightness, shortness of breath, wheezing and stridor.    Cardiovascular: Negative for chest pain and palpitations.   Gastrointestinal: Negative for abdominal distention, abdominal pain, diarrhea, nausea and vomiting.   Genitourinary: Negative for difficulty urinating and dysuria.   Musculoskeletal: Negative for arthralgias and myalgias.   Skin: Negative for rash and wound.   Neurological: Negative for dizziness and seizures.   Psychiatric/Behavioral: Negative for agitation and confusion.       Past Medical History:   Diagnosis Date   • ADD (attention deficit disorder)    • Anxiety    • Depression    • Disease of thyroid gland    • Fibromyalgia    • Fibromyalgia, primary        No Known Allergies    Past Surgical History:   Procedure Laterality Date   • ADENOIDECTOMY     • CARPAL TUNNEL RELEASE Bilateral    •  SECTION     • CHOLECYSTECTOMY     • DILATATION AND CURETTAGE     • KIDNEY STONE SURGERY     • SALPINGECTOMY Bilateral    • TONSILLECTOMY     • WISDOM TOOTH EXTRACTION         Family History   Problem Relation Age of Onset   • Stroke Father    • Thyroid disease Father    • Hypertension Brother    • Ovarian cancer Maternal Grandmother    • Colon cancer Maternal Grandmother    • Deep vein thrombosis  Maternal Grandmother    • Diabetes Maternal Grandmother    • Breast cancer Paternal Aunt    • Thyroid disease Mother    • Uterine cancer Neg Hx        Social History     Socioeconomic History   • Marital status: Single     Spouse name: Not on file   • Number of children: Not on file   • Years of education: Not on file   • Highest education level: Not on file   Tobacco Use   • Smoking status: Current Every Day Smoker     Packs/day: 0.25     Types: Cigarettes   • Smokeless tobacco: Never Used   Substance and Sexual Activity   • Alcohol use: Yes     Comment: rarely   • Drug use: No   • Sexual activity: Yes     Partners: Male     Birth control/protection: Surgical           Objective   Physical Exam   Constitutional: She is oriented to person, place, and time. She appears well-developed.   HENT:   Head: Normocephalic.   Eyes: Pupils are equal, round, and reactive to light. EOM are normal.   Neck: Normal range of motion. Neck supple.   Cardiovascular: Normal rate and regular rhythm.   No murmur heard.  Pulmonary/Chest: Effort normal and breath sounds normal.   Abdominal: Soft. Bowel sounds are normal.   Musculoskeletal: Normal range of motion.   Neurological: She is alert and oriented to person, place, and time.   Skin: Skin is warm and dry.        Small red raised area present to the right upper inner thigh.  There is a small amount of redness around the area.  There is no fluctuance or drainage.    Psychiatric: She has a normal mood and affect.   Nursing note and vitals reviewed.      Procedures           ED Course  ED Course as of Sep 05 0004   Fri Sep 04, 2020   2239 I discussed the patient's discharge instructions with her. She voiced understanding of instructions.     [KS]      ED Course User Index  [KS] Dewayne Hansen APRN                                           [facility]  Number of Diagnoses or Management Options  Cellulitis of right lower extremity: minor  Risk of Complications, Morbidity, and/or  Mortality  Presenting problems: minimal  Diagnostic procedures: minimal  Management options: minimal    Patient Progress  Patient progress: stable      Final diagnoses:   Cellulitis of right lower extremity            Shoulders, Dewayne Posey, APRN  09/05/20 0004

## 2020-09-05 NOTE — DISCHARGE INSTRUCTIONS
Drink plenty of fluid.  Tylenol or motrin as needed for pain/fever. Continue bactrim. Follow up with PCP -call for appointment. Return to ED if condition does not improve or worsens

## 2020-09-14 ENCOUNTER — VIRTUAL VISIT (OUTPATIENT)
Dept: PSYCHOLOGY | Age: 45
End: 2020-09-14
Payer: MEDICAID

## 2020-09-14 PROCEDURE — 98968 PH1 ASSMT&MGMT NQHP 21-30: CPT | Performed by: SOCIAL WORKER

## 2020-09-15 ASSESSMENT — ANXIETY QUESTIONNAIRES
4. TROUBLE RELAXING: 3-NEARLY EVERY DAY
2. NOT BEING ABLE TO STOP OR CONTROL WORRYING: 3-NEARLY EVERY DAY
6. BECOMING EASILY ANNOYED OR IRRITABLE: 1-SEVERAL DAYS
5. BEING SO RESTLESS THAT IT IS HARD TO SIT STILL: 3-NEARLY EVERY DAY
3. WORRYING TOO MUCH ABOUT DIFFERENT THINGS: 3-NEARLY EVERY DAY
1. FEELING NERVOUS, ANXIOUS, OR ON EDGE: 3-NEARLY EVERY DAY
7. FEELING AFRAID AS IF SOMETHING AWFUL MIGHT HAPPEN: 3-NEARLY EVERY DAY
GAD7 TOTAL SCORE: 19

## 2020-09-15 ASSESSMENT — PATIENT HEALTH QUESTIONNAIRE - PHQ9
1. LITTLE INTEREST OR PLEASURE IN DOING THINGS: 0
3. TROUBLE FALLING OR STAYING ASLEEP: 0
7. TROUBLE CONCENTRATING ON THINGS, SUCH AS READING THE NEWSPAPER OR WATCHING TELEVISION: 0
9. THOUGHTS THAT YOU WOULD BE BETTER OFF DEAD, OR OF HURTING YOURSELF: 0
2. FEELING DOWN, DEPRESSED OR HOPELESS: 1
4. FEELING TIRED OR HAVING LITTLE ENERGY: 3
SUM OF ALL RESPONSES TO PHQ9 QUESTIONS 1 & 2: 1
SUM OF ALL RESPONSES TO PHQ QUESTIONS 1-9: 11
8. MOVING OR SPEAKING SO SLOWLY THAT OTHER PEOPLE COULD HAVE NOTICED. OR THE OPPOSITE, BEING SO FIGETY OR RESTLESS THAT YOU HAVE BEEN MOVING AROUND A LOT MORE THAN USUAL: 1
SUM OF ALL RESPONSES TO PHQ QUESTIONS 1-9: 11
10. IF YOU CHECKED OFF ANY PROBLEMS, HOW DIFFICULT HAVE THESE PROBLEMS MADE IT FOR YOU TO DO YOUR WORK, TAKE CARE OF THINGS AT HOME, OR GET ALONG WITH OTHER PEOPLE: 2
5. POOR APPETITE OR OVEREATING: 3
6. FEELING BAD ABOUT YOURSELF - OR THAT YOU ARE A FAILURE OR HAVE LET YOURSELF OR YOUR FAMILY DOWN: 3

## 2020-09-15 ASSESSMENT — COLUMBIA-SUICIDE SEVERITY RATING SCALE - C-SSRS
6. HAVE YOU EVER DONE ANYTHING, STARTED TO DO ANYTHING, OR PREPARED TO DO ANYTHING TO END YOUR LIFE?: NO
1. WITHIN THE PAST MONTH, HAVE YOU WISHED YOU WERE DEAD OR WISHED YOU COULD GO TO SLEEP AND NOT WAKE UP?: NO
2. HAVE YOU ACTUALLY HAD ANY THOUGHTS OF KILLING YOURSELF?: NO

## 2020-09-15 NOTE — PATIENT INSTRUCTIONS
Recommendations to patient:      1. Practice new coping, stress management, relaxation skills at least                   two times a day for at least 10-30 minutes. 2. Find at least one positive outlet per day that makes you feel better. 3. Talk things over with a good friend. Practice letting things go. 4. Stop, breathe, reset. \"I am ok. \"     Scheduled follow up appointment. Call for a sooner appointment if needed or if you need to change or cancel you appointment. Janell [phone]      call crisis line [phone] after hours if needed or go to closest ED                                                Depression Cycle         Thoughts        -There is no point in doing anything        -I dont have the energy        -I dont feel like it        -I will probably fail        -I need to rest more        -Ill do it later                          Depressive Symptoms     Behaviors  -Tired/Overwhelmed      -Stay in Bed  -Bored        -Avoid People  -Depressed       -Avoid Fun Activities  -Feeling Worthless      -Avoid Work  -Apathetic/Discouraged     -Guilty                                          Consequences of Behaviors      -Isolated from friends and family      -Decreased number of rewarding experiences      -Decreased sense of accomplishment and pleasure      -Discouraged      -Sink deeper and deeper into a state of paralysis      -Decreased productivity convinces you that you are inadequate      The Stress Response and How It Can Affect You   The stress response, or fight or flight response is the emergency reaction system of the body. It is there to keep you safe in emergencies. The stress response includes physical and thought responses to your perception of various situations. When the stress response is turned on, your body may release substances like adrenaline and cortisol.  Your organs are programmed to respond in certain ways to situations that are viewed as challenging or threatening. The stress response can work against you. You can turn it on when you dont really need it and, as a result, perceive something as an emergency when its really not. It can turn on when you are just thinking about past or future events. Harmless, chronic conditions can be intensified by the stress response activating too often, with too much intensity, or for too long. Stress responses can be different for different individuals. Below is a list of some common stress related responses people have. ([place] the responses you have had in the last 2 weeks.)     Physical Responses   Muscle aches   Insomnia   ? Heart rate   Headache   Weight gain   Nausea   Constipation   Dry mouth   Muscle twitching  Weight loss   Low energy   Weakness   Tight chest   Diarrhea   Dizziness   Trembling   Stomach cramps  Chills    Hot flashes   Sweating   Pounding heart  Choking feeling  Chest pain   Leg cramps   Numb hands/feet Dry throat   Appetite change  Face flushing   ? Blood pressure  Light-headedness  Feeling faint       Troubleswallowing   Rash ? Urination   Neck pain     Tingling hands/feet     Emotional and Thought Responses   Restlessness   Agitation   Insecurity            Worthlessness   Anxiety   Stress   Depression            Hopelessness   Guilt    Defensiveness  Anger           Racing thoughts   Nightmares   Intense thinking  Sensitivity          Expecting the worst   Numbness   Lack of motivation  Mood swings             Forgetfulness   ? Concentration  Rigidity              Preoccupation  Intolerance     Behavioral Responses   Avoidance   Withdrawal   Neglect   ? Alcohol use    Smoking   ? Eating   Arguing       Poor appearance   ? Spending   Poor hygiene   ? Eating  Seeking reassurance   Nail biting   Skin picking   ? Talking        ? Body checking   Sexual problems  Foot tapping  Fidgeting Rapid walking    ? Exercise   Teeth clenching           Multitasking  Aggressive speaking       ?  Fun activities ? Sleeping      ? Relaxing activities     Seeking information     The parasympathetic nervous system in your body is designed to turn on your bodys relaxation response. Your behaviors and thinking can keep your bodys natural relaxation response from operating at its best.   Getting your body to relax on a daily basis for at least brief periods can help decrease unpleasant stress responses. Learning to relax your body, through specific breathing and relaxation exercises as well as by minimizing stressful thinking, can help your bodys natural relaxation system be more effective. STRESS MANAGEMENT STRATEGIES    1. Recognize Stress:  Learning to recognize when your body is reacting to stress and identifying our stressors are the first steps in managing stress. 2. Take a Break:  A change of pace, no matter how short, gives us a new outlook on old problems. Take a vacation 20 minutes a day - enjoy a change from the daily routine. 3. Learn to Relax:  Under stress, the muscles in our bodies stay tight. One of the most effective ways to combat tensions is deep muscle relaxation. Other techniques that produce muscle and mental relaxation are yoga, prayer, and deep breathing. 4. Be Nutritionally Aware:  Good nutrition is vital to optimum health, and is especially critical when we are under unusual stress, or going through a major life change. 5. Exercise Regularly:  Just like nutrition, exercise is imperative for maintaining good fitness. Whatever you enjoy - swimming, walking, jogging, aerobic exercise - will help you let off steam and work out stress. 6. Plan your Work:  Tension and anxiety really build up when our work seems endless. Plan your work to use time and energy more efficiently. Take one thing at a time. 7. Talk it Over: This may be the most important thing you can do for yourself if you cant get a handle on things. Find a good listener.   Just as a pressure relief valve allows steam to flow out of a pressure cooker and keeps it from blowing up, so talking allows stress to flow out of the body and keeps us from blowing up. 8. Accept What You Cannot Change:  If the problem is beyond your control at this time, try your best to accept it until you can change it. It beats spinning your wheels and getting nowhere. 9. Evaluate Your Perceptions:  What we think is sometimes what we feel. If we constantly think unrealistic or alarming thoughts about ourselves or other folks, then our stress level is increased. 10. Relax Unrealistic Standards:  When we set unrealistic standards for ourselves, we usually can never reach them. If we do, we burn out quickly. Set reasonable goals and standards. 11. Reward Yourself:  Find ways to reward yourself when youve completed a minor or major task. We cannot always depend on others to recognize us, so we must develop our own reward system. 12. Become Assertive: Take steps to solve problems instead of feeling helpless. Distinguishing assertiveness (respecting others rights and your rights) from aggressiveness and passivity can do much to resolve internal stress. 13. Rediscover Humor:  Learn to laugh at yourself and your situation! 14. Increase Pleasurable Activities:  Take time to participate in fun, pleasurable, activities on a regular basis.

## 2020-09-15 NOTE — PROGRESS NOTES
Behavioral Health Consultation  Ivory Arshad, [address] [place] [address] Consultant  9/15/2020  6:59 AM      TELEHEALTH EVALUATION -- Audio (During CFQWV-11 public health emergency)    Patient being evaluated by a phone visit encounter to address concerns as mentioned above. A caregiver was present when appropriate. Due to this being a TeleHealth encounter (During DRVYS-41 public health emergency), evaluation of the following organ systems was limited: Vitals/Constitutional/EENT/Resp/CV/GI//MS/Neuro/Skin/Heme-Lymph-Imm. Pursuant to the emergency declaration under the [facility], [facility] authority and the [employer] Act, this Virtual Visit was conducted with patient's (and/or legal guardian's) consent, to reduce the patient's risk of exposure to COVID-19 and provide necessary medical care. The patient (and/or legal guardian) has also been advised to contact this office for worsening conditions or problems, and seek emergency medical treatment and/or call 911 if deemed necessary. Patient identification was verified at the start of the visit: Yes     Services were provided through phone to substitute for in-person clinic visit. Patient and provider were located at their individual homes/office. Note is for 9/14/2020       . Did not have access to Epic at the time of service. 7:30am-8:19am    Time spent with Patient:  45 minutes  This is patient's first  [facility] appointment.     Reason for Consult:    Chief Complaint   Patient presents with    Anxiety    Depression    Stress     Referring Provider: Juliana Rob MD  [id number] [address]    Pt provided informed consent for the behavioral health program. Discussed with patient model of service to include the limits of confidentiality (i.e. abuse reporting, suicide intervention, etc.) and short-term intervention focused approach. Advised patient/parent to guard AVS and file at home to protect private information. Advised patient/parent to only hand in excuse if needed and not AVS.  Pt indicated understanding. Feedback given to PCP. S:  Patient reports problems with feeling anxious all the time. On a scale from 0-100, always off the charts, my hands are always doing something and I am pacing a lot. I always worry, doubt myself all the time, always worry that something will happen, I am scared to lose everything and I have always been made fun of since I was little, that was and is hard on me. I have two children that I am raising myself, own a trailer, and work at a coffee shop. I struggled with the mask, because I am claustrophobic, but I am managing. I have a new boyfriend and he just moved in. He has two kids and they come visit which is good for my daughter with all this COVID. 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 I finished school and even went to college but because of the disorder I was not able to get my associates in nursing and had to give that up. It makes me depressed. I saw two counselors and a Psychiatrist a couple of years ago. Was diagnosed with ADHD at age 12, anxiety and depression. The ADHD diagnoses is questionable in view of the processing disorder and anxiety causing related symptoms. Is taking Adderall. She wonders if this should be upped. Advised that a stimulant can exacerbate anxiety. O:  MSE:    Mood    Anxious  Depressed  Worthless  Low self-esteem  Irritability  [place]  Panic attacks  Affect    labile affect  Appetite Fair  Sleep disturbance No, good with medication  Fatigue Yes, Thyroid medication  Loss of pleasure Yes  Attention/Concentration    Fair  Morbid ideation No  Suicide Assessment    No current or recent Si. Never had thought of harming others. Assessed.    Patient has recurrent, passive SI, no plan or intent, easily dismissed. The last time several months and a few years ago. Patient agreed to access services should thoughts return or worsen. Agreed to call crisis line [phone] after hours if needed or go to closest ED. History:    Medications:   Current Outpatient Medications   Medication Sig Dispense Refill    clonazePAM (KLONOPIN) 0.5 MG tablet Take 1 tablet by mouth nightly as needed for Anxiety for up to 30 days. 30 tablet 0    levothyroxine (SYNTHROID) 137 MCG tablet Take 1 tablet by mouth daily 90 tablet 1    omeprazole (PRILOSEC) 10 MG delayed release capsule Take 1 capsule by mouth daily 30 capsule 3    PARoxetine (PAXIL) 10 MG tablet Take 1 tablet by mouth every morning 30 tablet 5    atomoxetine (STRATTERA) 100 MG capsule Take 100 mg by mouth daily      Naproxen Sodium (ALEVE PO) Take by mouth      liothyronine (CYTOMEL) 5 MCG tablet Take 5 mcg by mouth daily      pregabalin (LYRICA) 75 MG capsule Take 75 mg by mouth 3 times daily.  cyclobenzaprine (FLEXERIL) 10 MG tablet Take 10 mg by mouth 3 times daily as needed      hydrOXYzine (VISTARIL) 25 MG capsule Take 1 capsule by mouth 3 times daily as needed for Anxiety 30 capsule 3     No current facility-administered medications for this visit. Social History:   Social History     Socioeconomic History    Marital status:      Spouse name: Not on file    Number of children: Not on file    Years of education: Not on file    Highest education level: Not on file   Occupational History    Not on file   Social Needs    Financial resource strain: Not on file    Food insecurity     Worry: Not on file     Inability: Not on file   [employer] needs     Medical: Not on file     Non-medical: Not on file   Tobacco Use    Smoking status: Current Every Day Smoker     Packs/day: 0.50     Types: Cigarettes     Start date: 1992    Smokeless tobacco: Never Used   Substance and Sexual Activity    Alcohol use:  Yes Frequency: Monthly or less     Binge frequency: Less than monthly     Comment: rarely    Drug use: No    Sexual activity: Not on file   Lifestyle    Physical activity     Days per week: Not on file     Minutes per session: Not on file    Stress: Not on file   Relationships    Social connections     Talks on phone: Not on file     Gets together: Not on file     Attends Yazdanism service: Not on file     Active member of club or organization: Not on file     Attends meetings of clubs or organizations: Not on file     Relationship status: Not on file    Intimate partner violence     Fear of current or ex partner: Not on file     Emotionally abused: Not on file     Physically abused: Not on file     Forced sexual activity: Not on file   Other Topics Concern    Not on file   Social History Narrative    Not on file       TOBACCO:   reports that she has been smoking cigarettes. She started smoking about 28 years ago. She has been smoking about 0.50 packs per day. She has never used smokeless tobacco.  ETOH:   reports current alcohol use. Family History:   Family History   Problem Relation Age of Onset    Other Father         Heart stents         A:  Patient presents for consult due to problems with depression, anxiety, multiple stressors, chronic health issues, Thyroid medication, audio processing sensory disorder. Continued consultation is clinically/medically necessary to support in learning new skills and build confidence to deal better with these issues. Patient response to consults, finds new strategies helpful.      PHQ Scores 9/15/2020 8/18/2020 8/18/2020   PHQ2 Score 1 3 6   PHQ9 Score 11 11 21     Interpretation of Total Score Depression Severity: 1-4 = Minimal depression, 5-9 = Mild depression, 10-14 = Moderate depression, 15-19 = Moderately severe depression, 20-27 = Severe depression    ELIESER-7  Feeling nervous, anxious, or on edge: 3-Nearly every day  Not able to stop or control worrying: 3-Nearly every day  Worrying too much about different things: 3-Nearly every day  Trouble relaxing: 3-Nearly every day  Being so restless that it's hard to sit still: 3-Nearly every day  Becoming easily annoyed or irritable: 1-Several days  Feeling afraid as if something awful might happen: 3-Nearly every day  ELIESER-7 Total Score: 19    ELIESER-7 score interpretation:  0-4 Subclinical, 5-9 Mild, 10-14 Moderate, 15-21 Severe    Diagnosis:    1. ELIESER (generalized anxiety disorder)    2. Dysthymia    3. Situational stress          Diagnosis Date    Depression     Fibromyalgia     Hx of Graves' disease     Thyroid disease          Plan:  Pt interventions:  Discussed and set plan for behavioral activation, Trained in relaxation strategies, Discussed self-care (sleep, nutrition, rewarding activities, social support, exercise) and [place]-setting to identify pt's primary goals for [facility] visit / overall health. Affirmation and Normalization. Education about effects of these kind of stressors. Grief support. Pt Behavioral Change Plan:    See patient instructions.

## 2020-09-24 PROBLEM — N39.0 UTI (URINARY TRACT INFECTION): Status: RESOLVED | Noted: 2020-08-25 | Resolved: 2020-09-24

## 2020-10-06 RX ORDER — DEXTROAMPHETAMINE SACCHARATE, AMPHETAMINE ASPARTATE, DEXTROAMPHETAMINE SULFATE AND AMPHETAMINE SULFATE 2.5; 2.5; 2.5; 2.5 MG/1; MG/1; MG/1; MG/1
10 TABLET ORAL DAILY
Qty: 30 TABLET | Refills: 0 | Status: SHIPPED | OUTPATIENT
Start: 2020-10-06 | End: 2020-11-02

## 2020-10-06 NOTE — TELEPHONE ENCOUNTER
Ale Flores called to request a refill on her medication. Last office visit : 8/26/2020   Next office visit : 11/17/2020     Last UDS: No results found for: Pop Chi, LABBENZ, BUPRENUR, COCAIMETSCRU, GABAPENTIN, MDMA, METAMPU, OPIATESCREENURINE, OXTCOSU, PHENCYCLIDINESCREENURINE, PROPOXYPHENE, THCSCREENUR, TRICYUR    Last Fito: 08/26/2020  Medication Contract: 08/18/2020     Requested Prescriptions     Pending Prescriptions Disp Refills    amphetamine-dextroamphetamine (ADDERALL, 10MG,) 10 MG tablet 30 tablet 0     Sig: Take 1 tablet by mouth daily for 30 days. Please approve or refuse this medication.    Karen Quintana

## 2020-10-08 RX ORDER — CLONAZEPAM 0.5 MG/1
0.5 TABLET ORAL NIGHTLY PRN
Qty: 30 TABLET | Refills: 0 | Status: SHIPPED | OUTPATIENT
Start: 2020-10-08 | End: 2020-11-07

## 2020-10-08 NOTE — TELEPHONE ENCOUNTER
Ruth [place] called to request a refill on her medication. Last office visit : 8/26/2020   Next office visit : 11/17/2020     Last UDS: No results found for: Terryl Bety, LABBENZ, BUPRENUR, COCAIMETSCRU, GABAPENTIN, MDMA, METAMPU, OPIATESCREENURINE, OXTCOSU, PHENCYCLIDINESCREENURINE, PROPOXYPHENE, THCSCREENUR, TRICYUR    Last Fito: 08/26/2020  Medication Contract: 08/18/2020     Requested Prescriptions     Pending Prescriptions Disp Refills    clonazePAM (KLONOPIN) 0.5 MG tablet 30 tablet 0     Sig: Take 1 tablet by mouth nightly as needed for Anxiety for up to 30 days. Please approve or refuse this medication.    Diandra Magaña

## 2020-11-02 RX ORDER — DEXTROAMPHETAMINE SACCHARATE, AMPHETAMINE ASPARTATE, DEXTROAMPHETAMINE SULFATE AND AMPHETAMINE SULFATE 2.5; 2.5; 2.5; 2.5 MG/1; MG/1; MG/1; MG/1
10 TABLET ORAL DAILY
Qty: 30 TABLET | Refills: 0 | Status: SHIPPED | OUTPATIENT
Start: 2020-11-05 | End: 2020-12-05

## 2020-11-02 NOTE — TELEPHONE ENCOUNTER
Sosa Tovar called to request a refill on her medication. Last office visit : 8/26/2020   Next office visit : 11/17/2020     Last UDS: No results found for: Phyliss Burger, LABBENZ, BUPRENUR, COCAIMETSCRU, GABAPENTIN, MDMA, METAMPU, OPIATESCREENURINE, OXTCOSU, PHENCYCLIDINESCREENURINE, PROPOXYPHENE, THCSCREENUR, TRICYUR    Last Fito: 11/02/2020  Medication Contract: 08/18/2020     Requested Prescriptions     Pending Prescriptions Disp Refills    amphetamine-dextroamphetamine (ADDERALL) 10 MG tablet [Pharmacy Med Name: Amphetamine-Dextroamphetamine 10 MG Oral Tablet (ADDERALL)] 30 tablet 0     Sig: Take 1 tablet by mouth daily for 30 days. Please approve or refuse this medication.    Uzair Diaz

## 2020-11-04 ENCOUNTER — OFFICE VISIT (OUTPATIENT)
Dept: FAMILY MEDICINE CLINIC | Facility: CLINIC | Age: 45
End: 2020-11-04

## 2020-11-04 VITALS
OXYGEN SATURATION: 97 % | DIASTOLIC BLOOD PRESSURE: 70 MMHG | TEMPERATURE: 98.2 F | HEIGHT: 65 IN | HEART RATE: 90 BPM | BODY MASS INDEX: 30.29 KG/M2 | SYSTOLIC BLOOD PRESSURE: 120 MMHG | WEIGHT: 181.8 LBS

## 2020-11-04 DIAGNOSIS — F41.9 ANXIETY DISORDER, UNSPECIFIED: ICD-10-CM

## 2020-11-04 DIAGNOSIS — F90.2 ATTENTION DEFICIT HYPERACTIVITY DISORDER (ADHD), COMBINED TYPE: Primary | ICD-10-CM

## 2020-11-04 DIAGNOSIS — F17.200 SMOKER: ICD-10-CM

## 2020-11-04 DIAGNOSIS — E78.6 ABNORMALLY LOW HIGH DENSITY LIPOPROTEIN (HDL) CHOLESTEROL WITH HYPERTRIGLYCERIDEMIA: ICD-10-CM

## 2020-11-04 DIAGNOSIS — E78.1 ABNORMALLY LOW HIGH DENSITY LIPOPROTEIN (HDL) CHOLESTEROL WITH HYPERTRIGLYCERIDEMIA: ICD-10-CM

## 2020-11-04 DIAGNOSIS — R20.9 DEFICIT IN SENSORY PERCEPTION: ICD-10-CM

## 2020-11-04 DIAGNOSIS — G47.00 INSOMNIA, UNSPECIFIED TYPE: ICD-10-CM

## 2020-11-04 DIAGNOSIS — M79.7 FIBROMYALGIA: ICD-10-CM

## 2020-11-04 DIAGNOSIS — E89.0 HYPOTHYROIDISM, POSTABLATIVE: ICD-10-CM

## 2020-11-04 DIAGNOSIS — F09 COGNITIVE DISORDER: ICD-10-CM

## 2020-11-04 PROCEDURE — 99215 OFFICE O/P EST HI 40 MIN: CPT | Performed by: FAMILY MEDICINE

## 2020-11-04 RX ORDER — CLONAZEPAM 0.5 MG/1
0.5 TABLET ORAL NIGHTLY
Qty: 30 TABLET | Refills: 2 | Status: SHIPPED | OUTPATIENT
Start: 2020-11-04 | End: 2021-02-12

## 2020-11-04 RX ORDER — PREGABALIN 100 MG/1
100 CAPSULE ORAL 3 TIMES DAILY
Qty: 90 CAPSULE | Refills: 2 | Status: SHIPPED | OUTPATIENT
Start: 2020-11-04 | End: 2021-01-22

## 2020-11-04 RX ORDER — COVID-19 ANTIGEN TEST
KIT MISCELLANEOUS
COMMUNITY
End: 2020-12-02

## 2020-11-04 RX ORDER — DEXTROAMPHETAMINE SACCHARATE, AMPHETAMINE ASPARTATE, DEXTROAMPHETAMINE SULFATE AND AMPHETAMINE SULFATE 2.5; 2.5; 2.5; 2.5 MG/1; MG/1; MG/1; MG/1
1 TABLET ORAL 2 TIMES DAILY
Qty: 60 TABLET | Refills: 0 | Status: SHIPPED | OUTPATIENT
Start: 2020-11-04 | End: 2020-12-02
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
## 2020-11-04 NOTE — PATIENT INSTRUCTIONS
- Increase the Adderall to twice a day with the second dose around noon    - Stop the cyclobenzaprine muscle relaxer all together and track the concentration and alertness during the day    - Next we may stop the hydroxyzine down the line    - Continue Klonopin just at bedtime for sleep; we may look at going to a half dose down the line.    - Continue current thyroid medications    - Continue Paxil at current dose    - Continue Lyrica 100 mg three times a day and follow up with Dr. Jimenez next week.

## 2020-11-10 ENCOUNTER — TELEPHONE (OUTPATIENT)
Dept: FAMILY MEDICINE CLINIC | Facility: CLINIC | Age: 45
End: 2020-11-10

## 2020-11-10 DIAGNOSIS — M79.7 FIBROMYALGIA: Primary | ICD-10-CM

## 2020-11-10 RX ORDER — BACLOFEN 10 MG/1
10 TABLET ORAL 2 TIMES DAILY PRN
Qty: 60 TABLET | Refills: 0 | Status: SHIPPED | OUTPATIENT
Start: 2020-11-10 | End: 2020-12-02 | Stop reason: SDUPTHER

## 2020-11-10 NOTE — TELEPHONE ENCOUNTER
Working on patients referral there is not a neuropsychologist that we have found that will take her insurance. Is she planning on getting commercial insurance through her employer or staying on medicaid? If she gets commercial insurance there are providers that will see her but not with medicaid.     Called patient left vm to contact the office.

## 2020-11-10 NOTE — TELEPHONE ENCOUNTER
Please make sure she is set up with Dr. Mamadou Barrientos on the referral again since I had changed the provider earlier today trying to find one that would work with her insurance.    She is asking for muscle relaxer for her fibromyalgia.  She has reported Zanaflex didn't seem to help so she stopped it.  Flexeril more recently at TID at 5 mg dose isn't helping the fibromyalgia at all really now.  We stopped the flexeril due to possible interference with her alertness and cognitive issues.  I will send in some baclofen for her to try now and recheck it at the upcoming appt for her f/u Dec 2nd.  Please let her know.

## 2020-11-17 ENCOUNTER — TELEPHONE (OUTPATIENT)
Dept: INTERNAL MEDICINE | Age: 45
End: 2020-11-17
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
## 2020-12-02 ENCOUNTER — OFFICE VISIT (OUTPATIENT)
Dept: FAMILY MEDICINE CLINIC | Facility: CLINIC | Age: 45
End: 2020-12-02

## 2020-12-02 ENCOUNTER — LAB (OUTPATIENT)
Dept: LAB | Facility: HOSPITAL | Age: 45
End: 2020-12-02

## 2020-12-02 VITALS
BODY MASS INDEX: 30.79 KG/M2 | DIASTOLIC BLOOD PRESSURE: 77 MMHG | WEIGHT: 185 LBS | HEART RATE: 88 BPM | TEMPERATURE: 97.1 F | OXYGEN SATURATION: 98 % | SYSTOLIC BLOOD PRESSURE: 120 MMHG

## 2020-12-02 DIAGNOSIS — F90.2 ATTENTION DEFICIT HYPERACTIVITY DISORDER (ADHD), COMBINED TYPE: Primary | ICD-10-CM

## 2020-12-02 DIAGNOSIS — R82.998 DARK URINE: ICD-10-CM

## 2020-12-02 DIAGNOSIS — F41.1 GENERALIZED ANXIETY DISORDER: ICD-10-CM

## 2020-12-02 DIAGNOSIS — M79.7 FIBROMYALGIA: ICD-10-CM

## 2020-12-02 DIAGNOSIS — N39.0 FREQUENT UTI: ICD-10-CM

## 2020-12-02 DIAGNOSIS — R23.2 RED FACE: ICD-10-CM

## 2020-12-02 DIAGNOSIS — E63.8: ICD-10-CM

## 2020-12-02 DIAGNOSIS — F09 COGNITIVE DISORDER: ICD-10-CM

## 2020-12-02 DIAGNOSIS — K21.9 GASTROESOPHAGEAL REFLUX DISEASE WITHOUT ESOPHAGITIS: ICD-10-CM

## 2020-12-02 DIAGNOSIS — E89.0 HYPOTHYROIDISM, POSTABLATIVE: ICD-10-CM

## 2020-12-02 DIAGNOSIS — T30.0 SKIN BURN: ICD-10-CM

## 2020-12-02 DIAGNOSIS — R11.0 NAUSEA: ICD-10-CM

## 2020-12-02 DIAGNOSIS — F17.200 SMOKER: ICD-10-CM

## 2020-12-02 LAB
BACTERIA UR QL AUTO: ABNORMAL /HPF
BILIRUB UR QL STRIP: NEGATIVE
CLARITY UR: ABNORMAL
COLOR UR: YELLOW
GLUCOSE UR STRIP-MCNC: NEGATIVE MG/DL
HGB UR QL STRIP.AUTO: NEGATIVE
HYALINE CASTS UR QL AUTO: ABNORMAL /LPF
KETONES UR QL STRIP: NEGATIVE
LEUKOCYTE ESTERASE UR QL STRIP.AUTO: ABNORMAL
NITRITE UR QL STRIP: NEGATIVE
PH UR STRIP.AUTO: 6 [PH] (ref 5–8)
PROT UR QL STRIP: NEGATIVE
RBC # UR: ABNORMAL /HPF
REF LAB TEST METHOD: ABNORMAL
SP GR UR STRIP: 1.02 (ref 1–1.03)
SQUAMOUS #/AREA URNS HPF: ABNORMAL /HPF
UROBILINOGEN UR QL STRIP: ABNORMAL
WBC UR QL AUTO: ABNORMAL /HPF

## 2020-12-02 PROCEDURE — 99215 OFFICE O/P EST HI 40 MIN: CPT | Performed by: FAMILY MEDICINE

## 2020-12-02 PROCEDURE — 87077 CULTURE AEROBIC IDENTIFY: CPT | Performed by: FAMILY MEDICINE

## 2020-12-02 PROCEDURE — 87086 URINE CULTURE/COLONY COUNT: CPT | Performed by: FAMILY MEDICINE

## 2020-12-02 PROCEDURE — 81001 URINALYSIS AUTO W/SCOPE: CPT | Performed by: FAMILY MEDICINE

## 2020-12-02 PROCEDURE — 87186 SC STD MICRODIL/AGAR DIL: CPT | Performed by: FAMILY MEDICINE

## 2020-12-02 RX ORDER — METRONIDAZOLE 10 MG/G
GEL TOPICAL NIGHTLY
Qty: 60 G | Refills: 2 | Status: SHIPPED | OUTPATIENT
Start: 2020-12-02 | End: 2020-12-03 | Stop reason: SDUPTHER

## 2020-12-02 RX ORDER — NAPROXEN 500 MG/1
500 TABLET ORAL 2 TIMES DAILY WITH MEALS
Qty: 60 TABLET | Refills: 5 | Status: SHIPPED | OUTPATIENT
Start: 2020-12-02 | End: 2020-12-03 | Stop reason: SDUPTHER

## 2020-12-02 RX ORDER — BACLOFEN 10 MG/1
10 TABLET ORAL 3 TIMES DAILY
Qty: 90 TABLET | Refills: 5 | Status: SHIPPED | OUTPATIENT
Start: 2020-12-02 | End: 2020-12-03 | Stop reason: SDUPTHER

## 2020-12-02 RX ORDER — OMEPRAZOLE 20 MG/1
20 CAPSULE, DELAYED RELEASE ORAL DAILY
Qty: 30 CAPSULE | Refills: 5 | Status: SHIPPED | OUTPATIENT
Start: 2020-12-02 | End: 2020-12-03 | Stop reason: SDUPTHER

## 2020-12-02 RX ORDER — DEXTROAMPHETAMINE SACCHARATE, AMPHETAMINE ASPARTATE, DEXTROAMPHETAMINE SULFATE AND AMPHETAMINE SULFATE 5; 5; 5; 5 MG/1; MG/1; MG/1; MG/1
20 TABLET ORAL 2 TIMES DAILY
Qty: 60 TABLET | Refills: 0 | Status: SHIPPED | OUTPATIENT
Start: 2020-12-02 | End: 2020-12-03 | Stop reason: SDUPTHER

## 2020-12-02 RX ORDER — SULFAMETHOXAZOLE AND TRIMETHOPRIM 800; 160 MG/1; MG/1
1 TABLET ORAL 2 TIMES DAILY
Qty: 14 TABLET | Refills: 0 | Status: SHIPPED | OUTPATIENT
Start: 2020-12-02 | End: 2020-12-03 | Stop reason: SDUPTHER
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
## 2020-12-03 ENCOUNTER — TRANSCRIBE ORDERS (OUTPATIENT)
Dept: ADMINISTRATIVE | Facility: HOSPITAL | Age: 45
End: 2020-12-03

## 2020-12-03 DIAGNOSIS — E03.9 HYPOTHYROIDISM, UNSPECIFIED TYPE: ICD-10-CM

## 2020-12-03 DIAGNOSIS — R53.83 MALAISE AND FATIGUE: Primary | ICD-10-CM

## 2020-12-03 DIAGNOSIS — R53.81 MALAISE AND FATIGUE: Primary | ICD-10-CM

## 2020-12-03 DIAGNOSIS — T30.0 SKIN BURN: ICD-10-CM

## 2020-12-03 RX ORDER — DEXTROAMPHETAMINE SACCHARATE, AMPHETAMINE ASPARTATE, DEXTROAMPHETAMINE SULFATE AND AMPHETAMINE SULFATE 5; 5; 5; 5 MG/1; MG/1; MG/1; MG/1
20 TABLET ORAL 2 TIMES DAILY
Qty: 60 TABLET | Refills: 0 | Status: SHIPPED | OUTPATIENT
Start: 2020-12-03 | End: 2021-01-22

## 2020-12-03 RX ORDER — OMEPRAZOLE 20 MG/1
20 CAPSULE, DELAYED RELEASE ORAL DAILY
Qty: 30 CAPSULE | Refills: 5 | Status: SHIPPED | OUTPATIENT
Start: 2020-12-03 | End: 2021-06-29

## 2020-12-03 RX ORDER — NAPROXEN 500 MG/1
500 TABLET ORAL 2 TIMES DAILY WITH MEALS
Qty: 60 TABLET | Refills: 5 | Status: SHIPPED | OUTPATIENT
Start: 2020-12-03 | End: 2020-12-23

## 2020-12-03 RX ORDER — BACLOFEN 10 MG/1
10 TABLET ORAL 3 TIMES DAILY
Qty: 90 TABLET | Refills: 5 | Status: SHIPPED | OUTPATIENT
Start: 2020-12-03 | End: 2020-12-04 | Stop reason: SDUPTHER

## 2020-12-03 RX ORDER — SULFAMETHOXAZOLE AND TRIMETHOPRIM 800; 160 MG/1; MG/1
1 TABLET ORAL 2 TIMES DAILY
Qty: 14 TABLET | Refills: 0 | Status: SHIPPED | OUTPATIENT
Start: 2020-12-03 | End: 2021-01-01

## 2020-12-03 RX ORDER — METRONIDAZOLE 10 MG/G
GEL TOPICAL NIGHTLY
Qty: 60 G | Refills: 2 | Status: SHIPPED | OUTPATIENT
Start: 2020-12-03 | End: 2020-12-04 | Stop reason: SDUPTHER

## 2020-12-03 NOTE — TELEPHONE ENCOUNTER
Spoke to Nini pharmacist at [facility] who informed this writer prescriptions were filled on yesterday and ready for .    I informed pharmacist that all prescriptions will need to be canceled. Patient notified as well about cancellation and prescriptions being sent to [facility] pharmacy.

## 2020-12-03 NOTE — TELEPHONE ENCOUNTER
Patient called stating that she is wanting all of her script that were done yesterday called into the [facility]

## 2020-12-03 NOTE — TELEPHONE ENCOUNTER
Pt got Adderall and Klonopin rx'd yesterday in addition to the Prilosec, metrogel and silvadene and baclofen.  The controlled substances will be hard to change to a different pharmacy once already sent in.  Please trouble shoot this with the patient and let me know what I need to do.

## 2020-12-04 DIAGNOSIS — R23.2 RED FACE: ICD-10-CM

## 2020-12-04 DIAGNOSIS — T30.0 SKIN BURN: ICD-10-CM

## 2020-12-04 DIAGNOSIS — M79.7 FIBROMYALGIA: ICD-10-CM

## 2020-12-04 LAB — BACTERIA SPEC AEROBE CULT: ABNORMAL

## 2020-12-04 RX ORDER — METRONIDAZOLE 10 MG/G
GEL TOPICAL NIGHTLY
Qty: 60 G | Refills: 2 | Status: SHIPPED | OUTPATIENT
Start: 2020-12-04 | End: 2020-12-23 | Stop reason: CLARIF

## 2020-12-04 RX ORDER — BACLOFEN 10 MG/1
10 TABLET ORAL 3 TIMES DAILY
Qty: 90 TABLET | Refills: 5 | Status: SHIPPED | OUTPATIENT
Start: 2020-12-04 | End: 2021-01-22 | Stop reason: DRUGHIGH
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
## 2020-12-04 NOTE — TELEPHONE ENCOUNTER
There are 3 pended medications that were not received at patient's pharmacy. I will also send her a [employer] message as well.

## 2020-12-15 ENCOUNTER — TELEPHONE (OUTPATIENT)
Dept: FAMILY MEDICINE CLINIC | Facility: CLINIC | Age: 45
End: 2020-12-15

## 2020-12-18 ENCOUNTER — TELEPHONE (OUTPATIENT)
Dept: FAMILY MEDICINE CLINIC | Facility: CLINIC | Age: 45
End: 2020-12-18

## 2020-12-18 NOTE — TELEPHONE ENCOUNTER
PT CALLED REGARDING A MEDICATION SHE WAS PRESCRIBED BY HER OTHER DOCTOR YESTERDAY; PT WAS PRESCRIBED TRAMADOL BUT IS CONCERNED THAT IT MIGHT NOT BE OKAY TO TAKE WITH HER NIGHTLY KLONOPIN    PLEASE ADVISE [phone]

## 2020-12-23 ENCOUNTER — OFFICE VISIT (OUTPATIENT)
Dept: FAMILY MEDICINE CLINIC | Facility: CLINIC | Age: 45
End: 2020-12-23

## 2020-12-23 VITALS
TEMPERATURE: 98.1 F | SYSTOLIC BLOOD PRESSURE: 114 MMHG | WEIGHT: 182.3 LBS | HEART RATE: 82 BPM | BODY MASS INDEX: 30.37 KG/M2 | HEIGHT: 65 IN | DIASTOLIC BLOOD PRESSURE: 74 MMHG | OXYGEN SATURATION: 98 %

## 2020-12-23 DIAGNOSIS — M54.50 CHRONIC MIDLINE LOW BACK PAIN WITHOUT SCIATICA: ICD-10-CM

## 2020-12-23 DIAGNOSIS — R23.2 RED FACE: ICD-10-CM

## 2020-12-23 DIAGNOSIS — G89.29 CHRONIC MIDLINE LOW BACK PAIN WITHOUT SCIATICA: ICD-10-CM

## 2020-12-23 DIAGNOSIS — M25.552 CHRONIC PAIN OF BOTH HIPS: ICD-10-CM

## 2020-12-23 DIAGNOSIS — F41.1 GENERALIZED ANXIETY DISORDER: ICD-10-CM

## 2020-12-23 DIAGNOSIS — M25.551 CHRONIC PAIN OF BOTH HIPS: ICD-10-CM

## 2020-12-23 DIAGNOSIS — M79.7 FIBROMYALGIA: Primary | ICD-10-CM

## 2020-12-23 DIAGNOSIS — Z91.89 AT HIGH RISK FOR ADVERSE MEDICATION EVENT: ICD-10-CM

## 2020-12-23 DIAGNOSIS — F98.8 ATTENTION DEFICIT DISORDER (ADD) WITHOUT HYPERACTIVITY: ICD-10-CM

## 2020-12-23 DIAGNOSIS — G89.29 CHRONIC PAIN OF BOTH HIPS: ICD-10-CM

## 2020-12-23 DIAGNOSIS — K21.9 GASTROESOPHAGEAL REFLUX DISEASE WITHOUT ESOPHAGITIS: ICD-10-CM

## 2020-12-23 PROCEDURE — 99215 OFFICE O/P EST HI 40 MIN: CPT | Performed by: FAMILY MEDICINE

## 2020-12-23 RX ORDER — CELECOXIB 200 MG/1
200 CAPSULE ORAL 2 TIMES DAILY
Qty: 60 CAPSULE | Refills: 0 | Status: SHIPPED | OUTPATIENT
Start: 2020-12-23 | End: 2021-01-22

## 2020-12-23 RX ORDER — METRONIDAZOLE 7.5 MG/G
GEL TOPICAL
Qty: 45 G | Refills: 1 | Status: SHIPPED | OUTPATIENT
Start: 2020-12-23 | End: 2021-12-23

## 2020-12-26 ENCOUNTER — IMMUNIZATION (OUTPATIENT)
Dept: VACCINE CLINIC | Facility: HOSPITAL | Age: 45
End: 2020-12-26

## 2020-12-26 PROCEDURE — 0011A: CPT | Performed by: OBSTETRICS & GYNECOLOGY

## 2020-12-26 PROCEDURE — 91301 HC SARSCO02 VAC 100MCG/0.5ML IM: CPT | Performed by: OBSTETRICS & GYNECOLOGY

## 2021-01-04 PROCEDURE — 87635 SARS-COV-2 COVID-19 AMP PRB: CPT | Performed by: NURSE PRACTITIONER

## 2021-01-13 RX ORDER — HYDROXYZINE PAMOATE 25 MG/1
CAPSULE ORAL
Qty: 30 CAPSULE | Refills: 3 | OUTPATIENT
Start: 2021-01-13

## 2021-01-15 ENCOUNTER — LAB (OUTPATIENT)
Dept: LAB | Facility: HOSPITAL | Age: 46
End: 2021-01-15

## 2021-01-15 DIAGNOSIS — M54.50 CHRONIC MIDLINE LOW BACK PAIN WITHOUT SCIATICA: ICD-10-CM

## 2021-01-15 DIAGNOSIS — Z91.89 AT HIGH RISK FOR ADVERSE MEDICATION EVENT: ICD-10-CM

## 2021-01-15 DIAGNOSIS — K21.9 GASTROESOPHAGEAL REFLUX DISEASE WITHOUT ESOPHAGITIS: ICD-10-CM

## 2021-01-15 DIAGNOSIS — M79.7 FIBROMYALGIA: ICD-10-CM

## 2021-01-15 DIAGNOSIS — F98.8 ATTENTION DEFICIT DISORDER (ADD) WITHOUT HYPERACTIVITY: ICD-10-CM

## 2021-01-15 DIAGNOSIS — G89.29 CHRONIC PAIN OF BOTH HIPS: ICD-10-CM

## 2021-01-15 DIAGNOSIS — M25.551 CHRONIC PAIN OF BOTH HIPS: ICD-10-CM

## 2021-01-15 DIAGNOSIS — F41.1 GENERALIZED ANXIETY DISORDER: ICD-10-CM

## 2021-01-15 DIAGNOSIS — G89.29 CHRONIC MIDLINE LOW BACK PAIN WITHOUT SCIATICA: ICD-10-CM

## 2021-01-15 DIAGNOSIS — M25.552 CHRONIC PAIN OF BOTH HIPS: ICD-10-CM

## 2021-01-15 LAB
ALBUMIN SERPL-MCNC: 4 G/DL (ref 3.5–5.2)
ALBUMIN/GLOB SERPL: 1.3 G/DL
ALP SERPL-CCNC: 71 U/L (ref 39–117)
ALT SERPL W P-5'-P-CCNC: 14 U/L (ref 1–33)
AMPHET+METHAMPHET UR QL: NEGATIVE
AMPHETAMINES UR QL: NEGATIVE
ANION GAP SERPL CALCULATED.3IONS-SCNC: 8 MMOL/L (ref 5–15)
AST SERPL-CCNC: 20 U/L (ref 1–32)
BARBITURATES UR QL SCN: NEGATIVE
BENZODIAZ UR QL SCN: NEGATIVE
BILIRUB SERPL-MCNC: 0.2 MG/DL (ref 0–1.2)
BUN SERPL-MCNC: 23 MG/DL (ref 6–20)
BUN/CREAT SERPL: 36.5 (ref 7–25)
BUPRENORPHINE SERPL-MCNC: NEGATIVE NG/ML
CALCIUM SPEC-SCNC: 9.1 MG/DL (ref 8.6–10.5)
CANNABINOIDS SERPL QL: POSITIVE
CHLORIDE SERPL-SCNC: 106 MMOL/L (ref 98–107)
CO2 SERPL-SCNC: 26 MMOL/L (ref 22–29)
COCAINE UR QL: NEGATIVE
CREAT SERPL-MCNC: 0.63 MG/DL (ref 0.57–1)
GFR SERPL CREATININE-BSD FRML MDRD: 102 ML/MIN/1.73
GLOBULIN UR ELPH-MCNC: 3.1 GM/DL
GLUCOSE SERPL-MCNC: 63 MG/DL (ref 65–99)
MAGNESIUM SERPL-MCNC: 1.9 MG/DL (ref 1.6–2.6)
METHADONE UR QL SCN: NEGATIVE
OPIATES UR QL: NEGATIVE
OXYCODONE UR QL SCN: NEGATIVE
PCP UR QL SCN: NEGATIVE
POTASSIUM SERPL-SCNC: 3.9 MMOL/L (ref 3.5–5.2)
PROPOXYPH UR QL: NEGATIVE
PROT SERPL-MCNC: 7.1 G/DL (ref 6–8.5)
SODIUM SERPL-SCNC: 140 MMOL/L (ref 136–145)
T3FREE SERPL-MCNC: 4.13 PG/ML (ref 2–4.4)
T4 FREE SERPL-MCNC: 1.9 NG/DL (ref 0.93–1.7)
TRICYCLICS UR QL SCN: NEGATIVE
TSH SERPL DL<=0.05 MIU/L-ACNC: 0.01 UIU/ML (ref 0.27–4.2)

## 2021-01-15 PROCEDURE — 86800 THYROGLOBULIN ANTIBODY: CPT | Performed by: OTOLARYNGOLOGY

## 2021-01-15 PROCEDURE — 82626 DEHYDROEPIANDROSTERONE: CPT | Performed by: OTOLARYNGOLOGY

## 2021-01-15 PROCEDURE — 80306 DRUG TEST PRSMV INSTRMNT: CPT

## 2021-01-15 PROCEDURE — 86376 MICROSOMAL ANTIBODY EACH: CPT | Performed by: OTOLARYNGOLOGY

## 2021-01-15 PROCEDURE — 80053 COMPREHEN METABOLIC PANEL: CPT

## 2021-01-15 PROCEDURE — 36415 COLL VENOUS BLD VENIPUNCTURE: CPT | Performed by: OTOLARYNGOLOGY

## 2021-01-15 PROCEDURE — 84443 ASSAY THYROID STIM HORMONE: CPT | Performed by: OTOLARYNGOLOGY

## 2021-01-15 PROCEDURE — 83735 ASSAY OF MAGNESIUM: CPT | Performed by: OTOLARYNGOLOGY

## 2021-01-15 PROCEDURE — 84481 FREE ASSAY (FT-3): CPT | Performed by: OTOLARYNGOLOGY

## 2021-01-15 PROCEDURE — 84439 ASSAY OF FREE THYROXINE: CPT | Performed by: OTOLARYNGOLOGY

## 2021-01-18 LAB
DHEA SERPL-MCNC: 565 NG/DL (ref 31–701)
THYROGLOB AB SERPL-ACNC: <1 IU/ML (ref 0–0.9)
THYROPEROXIDASE AB SERPL-ACNC: 11 IU/ML (ref 0–34)

## 2021-01-22 ENCOUNTER — OFFICE VISIT (OUTPATIENT)
Dept: FAMILY MEDICINE CLINIC | Facility: CLINIC | Age: 46
End: 2021-01-22

## 2021-01-22 VITALS
DIASTOLIC BLOOD PRESSURE: 74 MMHG | WEIGHT: 183.2 LBS | HEIGHT: 65 IN | BODY MASS INDEX: 30.52 KG/M2 | SYSTOLIC BLOOD PRESSURE: 105 MMHG | HEART RATE: 70 BPM | TEMPERATURE: 97.7 F | OXYGEN SATURATION: 99 %

## 2021-01-22 DIAGNOSIS — M25.551 CHRONIC PAIN OF BOTH HIPS: ICD-10-CM

## 2021-01-22 DIAGNOSIS — F41.1 GENERALIZED ANXIETY DISORDER: ICD-10-CM

## 2021-01-22 DIAGNOSIS — E55.9 VITAMIN D DEFICIENCY: ICD-10-CM

## 2021-01-22 DIAGNOSIS — M25.552 CHRONIC PAIN OF BOTH HIPS: ICD-10-CM

## 2021-01-22 DIAGNOSIS — E66.9 OBESITY (BMI 30-39.9): ICD-10-CM

## 2021-01-22 DIAGNOSIS — R94.6 ABNORMAL THYROID FUNCTION TEST: ICD-10-CM

## 2021-01-22 DIAGNOSIS — E89.0 HYPOTHYROIDISM, POSTABLATIVE: ICD-10-CM

## 2021-01-22 DIAGNOSIS — M79.7 FIBROMYALGIA: Primary | ICD-10-CM

## 2021-01-22 DIAGNOSIS — F09 COGNITIVE DISORDER: ICD-10-CM

## 2021-01-22 DIAGNOSIS — M54.2 NECK PAIN, CHRONIC: ICD-10-CM

## 2021-01-22 DIAGNOSIS — M25.511 CHRONIC PAIN OF BOTH SHOULDERS: ICD-10-CM

## 2021-01-22 DIAGNOSIS — G89.29 CHRONIC PAIN OF BOTH HIPS: ICD-10-CM

## 2021-01-22 DIAGNOSIS — M25.512 CHRONIC PAIN OF BOTH SHOULDERS: ICD-10-CM

## 2021-01-22 DIAGNOSIS — E78.5 HYPERLIPIDEMIA WITH LOW HDL: ICD-10-CM

## 2021-01-22 DIAGNOSIS — E78.6 HYPERLIPIDEMIA WITH LOW HDL: ICD-10-CM

## 2021-01-22 DIAGNOSIS — F98.8 ATTENTION DEFICIT DISORDER (ADD) WITHOUT HYPERACTIVITY: ICD-10-CM

## 2021-01-22 DIAGNOSIS — G89.29 NECK PAIN, CHRONIC: ICD-10-CM

## 2021-01-22 DIAGNOSIS — G89.29 CHRONIC PAIN OF BOTH SHOULDERS: ICD-10-CM

## 2021-01-22 PROCEDURE — 99214 OFFICE O/P EST MOD 30 MIN: CPT | Performed by: FAMILY MEDICINE

## 2021-01-22 RX ORDER — LEVOTHYROXINE SODIUM 0.15 MG/1
TABLET ORAL
Qty: 90 TABLET | Refills: 1 | Status: SHIPPED | OUTPATIENT
Start: 2021-01-22 | End: 2021-10-05 | Stop reason: SDUPTHER

## 2021-01-22 NOTE — PATIENT INSTRUCTIONS
- take 2 extra strength tylenol before you get shot #2 of the COVID and then take another 2 before bed that night or at supper that night.  You can 2 three times a day on the tylenol.  You can take tylenol with the tramadol and stop celebrex,    - restart voltaren 50 mg twice a day for pain and inflammation.  It's okay to take that with the tramadol and tylenol and lyrica but not with naprosyn or ibuprofen.    - stay off the cytomel, continue 150 mcg of synthroid ( levothyroxine)    - do a 12 hour fast before the labs in April, see Dr. Quiñones back in the office the following week.
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
## 2021-01-23 ENCOUNTER — IMMUNIZATION (OUTPATIENT)
Dept: VACCINE CLINIC | Facility: HOSPITAL | Age: 46
End: 2021-01-23

## 2021-01-23 PROCEDURE — 91301 HC SARSCO02 VAC 100MCG/0.5ML IM: CPT | Performed by: OBSTETRICS & GYNECOLOGY

## 2021-01-23 PROCEDURE — 0012A: CPT | Performed by: OBSTETRICS & GYNECOLOGY

## 2021-01-26 ENCOUNTER — PATIENT MESSAGE (OUTPATIENT)
Dept: FAMILY MEDICINE CLINIC | Facility: CLINIC | Age: 46
End: 2021-01-26

## 2021-02-01 RX ORDER — HYDROXYZINE PAMOATE 25 MG/1
CAPSULE ORAL
Qty: 30 CAPSULE | Refills: 3 | OUTPATIENT
Start: 2021-02-01

## 2021-02-01 NOTE — TELEPHONE ENCOUNTER
From: Kimberly Morgan  To: Rosalind Quiñones MD  Sent: 1/26/2021 11:15 AM CST  Subject: Non-Urgent Medical Question    Good morning,     I am wondering if there is another number to call, to contact the office of Dr. Barrientos? I am not able to reach his office. Have you been able to reach him?

## 2021-02-03 ENCOUNTER — PATIENT MESSAGE (OUTPATIENT)
Dept: FAMILY MEDICINE CLINIC | Facility: CLINIC | Age: 46
End: 2021-02-03

## 2021-02-03 DIAGNOSIS — F09 COGNITIVE DISORDER: ICD-10-CM

## 2021-02-03 DIAGNOSIS — M79.7 FIBROMYALGIA: ICD-10-CM

## 2021-02-03 DIAGNOSIS — F98.8 ATTENTION DEFICIT DISORDER (ADD) WITHOUT HYPERACTIVITY: ICD-10-CM

## 2021-02-03 DIAGNOSIS — F41.1 GENERALIZED ANXIETY DISORDER: Primary | ICD-10-CM

## 2021-02-03 RX ORDER — DULOXETIN HYDROCHLORIDE 30 MG/1
30 CAPSULE, DELAYED RELEASE ORAL DAILY
Qty: 30 CAPSULE | Refills: 0 | Status: SHIPPED | OUTPATIENT
Start: 2021-02-03 | End: 2021-02-24 | Stop reason: SDUPTHER

## 2021-02-03 NOTE — TELEPHONE ENCOUNTER
From: Kimberly Morgan  To: Rosalind Quiñones MD  Sent: 2/3/2021 8:08 AM CST  Subject: Prescription Question    Good morning,     I'm sorry I keep bothering you, but I was wondering why I'm not on any anxiety medication? I was trying to refill my visteril and the pharmacy stated that you discontinued it. I really need to be on something. My anxiety is horrible. Also can we just forget about Dr. Barrientos? I don't know what has happened, but the office isn't responding back. I have got to get on some type of ADD medication, I am having issues not being on something even if I get back on adderall, I need to have something.   Thank you for being patient and understanding.     Thank you,    Teresa Morgan

## 2021-02-03 NOTE — TELEPHONE ENCOUNTER
I will send in Cymbalta 30 mg a day for anxiety, ADHD and myalgias and see pt back in the office in 2 weeks.  Limit any tramadol use.  Monitor closely with lyrica and clonazepam concurrent use.  Pt agreeable.  Will stop Paxil.

## 2021-02-12 DIAGNOSIS — F41.9 ANXIETY DISORDER, UNSPECIFIED: ICD-10-CM

## 2021-02-12 DIAGNOSIS — M79.7 FIBROMYALGIA: ICD-10-CM

## 2021-02-12 DIAGNOSIS — G47.00 INSOMNIA, UNSPECIFIED TYPE: ICD-10-CM

## 2021-02-12 RX ORDER — CLONAZEPAM 0.5 MG/1
0.5 TABLET ORAL NIGHTLY
Qty: 30 TABLET | Refills: 2 | Status: SHIPPED | OUTPATIENT
Start: 2021-02-12 | End: 2021-03-24

## 2021-02-17 ENCOUNTER — APPOINTMENT (OUTPATIENT)
Dept: CT IMAGING | Facility: HOSPITAL | Age: 46
End: 2021-02-17

## 2021-02-17 ENCOUNTER — APPOINTMENT (OUTPATIENT)
Dept: GENERAL RADIOLOGY | Facility: HOSPITAL | Age: 46
End: 2021-02-17

## 2021-02-17 ENCOUNTER — HOSPITAL ENCOUNTER (EMERGENCY)
Facility: HOSPITAL | Age: 46
Discharge: HOME | End: 2021-02-17
Admitting: EMERGENCY MEDICINE

## 2021-02-17 VITALS
HEART RATE: 63 BPM | RESPIRATION RATE: 14 BRPM | TEMPERATURE: 98 F | OXYGEN SATURATION: 100 % | BODY MASS INDEX: 28.12 KG/M2 | HEIGHT: 66 IN | WEIGHT: 175 LBS | DIASTOLIC BLOOD PRESSURE: 96 MMHG | SYSTOLIC BLOOD PRESSURE: 131 MMHG

## 2021-02-17 DIAGNOSIS — R42 DIZZINESS: Primary | ICD-10-CM

## 2021-02-17 DIAGNOSIS — R00.2 PALPITATIONS: ICD-10-CM

## 2021-02-17 LAB
ALBUMIN SERPL-MCNC: 4.4 G/DL (ref 3.5–5.2)
ALBUMIN/GLOB SERPL: 1.3 G/DL
ALP SERPL-CCNC: 79 U/L (ref 39–117)
ALT SERPL W P-5'-P-CCNC: 13 U/L (ref 1–33)
AMPHET+METHAMPHET UR QL: NEGATIVE
AMPHETAMINES UR QL: NEGATIVE
ANION GAP SERPL CALCULATED.3IONS-SCNC: 8 MMOL/L (ref 5–15)
AST SERPL-CCNC: 16 U/L (ref 1–32)
BARBITURATES UR QL SCN: NEGATIVE
BASOPHILS # BLD AUTO: 0.06 10*3/MM3 (ref 0–0.2)
BASOPHILS NFR BLD AUTO: 0.7 % (ref 0–1.5)
BENZODIAZ UR QL SCN: NEGATIVE
BILIRUB SERPL-MCNC: 0.4 MG/DL (ref 0–1.2)
BILIRUB UR QL STRIP: NEGATIVE
BUN SERPL-MCNC: 19 MG/DL (ref 6–20)
BUN/CREAT SERPL: 27.1 (ref 7–25)
BUPRENORPHINE SERPL-MCNC: NEGATIVE NG/ML
CALCIUM SPEC-SCNC: 9.5 MG/DL (ref 8.6–10.5)
CANNABINOIDS SERPL QL: NEGATIVE
CHLORIDE SERPL-SCNC: 102 MMOL/L (ref 98–107)
CLARITY UR: CLEAR
CO2 SERPL-SCNC: 29 MMOL/L (ref 22–29)
COCAINE UR QL: NEGATIVE
COLOR UR: YELLOW
CREAT SERPL-MCNC: 0.7 MG/DL (ref 0.57–1)
D DIMER PPP FEU-MCNC: 0.42 MG/L (FEU) (ref 0–0.5)
DEPRECATED RDW RBC AUTO: 43.9 FL (ref 37–54)
EOSINOPHIL # BLD AUTO: 0.11 10*3/MM3 (ref 0–0.4)
EOSINOPHIL NFR BLD AUTO: 1.3 % (ref 0.3–6.2)
ERYTHROCYTE [DISTWIDTH] IN BLOOD BY AUTOMATED COUNT: 14.5 % (ref 12.3–15.4)
ETHANOL UR QL: <0.01 %
GFR SERPL CREATININE-BSD FRML MDRD: 90 ML/MIN/1.73
GLOBULIN UR ELPH-MCNC: 3.3 GM/DL
GLUCOSE SERPL-MCNC: 97 MG/DL (ref 65–99)
GLUCOSE UR STRIP-MCNC: NEGATIVE MG/DL
HCT VFR BLD AUTO: 41.5 % (ref 34–46.6)
HGB BLD-MCNC: 14.1 G/DL (ref 12–15.9)
HGB UR QL STRIP.AUTO: NEGATIVE
HOLD SPECIMEN: NORMAL
HOLD SPECIMEN: NORMAL
IMM GRANULOCYTES # BLD AUTO: 0.01 10*3/MM3 (ref 0–0.05)
IMM GRANULOCYTES NFR BLD AUTO: 0.1 % (ref 0–0.5)
KETONES UR QL STRIP: NEGATIVE
LEUKOCYTE ESTERASE UR QL STRIP.AUTO: NEGATIVE
LYMPHOCYTES # BLD AUTO: 3.54 10*3/MM3 (ref 0.7–3.1)
LYMPHOCYTES NFR BLD AUTO: 42.1 % (ref 19.6–45.3)
MCH RBC QN AUTO: 28.5 PG (ref 26.6–33)
MCHC RBC AUTO-ENTMCNC: 34 G/DL (ref 31.5–35.7)
MCV RBC AUTO: 83.8 FL (ref 79–97)
METHADONE UR QL SCN: NEGATIVE
MONOCYTES # BLD AUTO: 0.76 10*3/MM3 (ref 0.1–0.9)
MONOCYTES NFR BLD AUTO: 9 % (ref 5–12)
NEUTROPHILS NFR BLD AUTO: 3.93 10*3/MM3 (ref 1.7–7)
NEUTROPHILS NFR BLD AUTO: 46.8 % (ref 42.7–76)
NITRITE UR QL STRIP: NEGATIVE
NRBC BLD AUTO-RTO: 0 /100 WBC (ref 0–0.2)
OPIATES UR QL: NEGATIVE
OXYCODONE UR QL SCN: NEGATIVE
PCP UR QL SCN: NEGATIVE
PH UR STRIP.AUTO: 7.5 [PH] (ref 5–8)
PLATELET # BLD AUTO: 208 10*3/MM3 (ref 140–450)
PMV BLD AUTO: 11.5 FL (ref 6–12)
POTASSIUM SERPL-SCNC: 3.8 MMOL/L (ref 3.5–5.2)
PROPOXYPH UR QL: NEGATIVE
PROT SERPL-MCNC: 7.7 G/DL (ref 6–8.5)
PROT UR QL STRIP: NEGATIVE
RBC # BLD AUTO: 4.95 10*6/MM3 (ref 3.77–5.28)
SODIUM SERPL-SCNC: 139 MMOL/L (ref 136–145)
SP GR UR STRIP: 1.01 (ref 1–1.03)
T4 FREE SERPL-MCNC: 2.03 NG/DL (ref 0.93–1.7)
TRICYCLICS UR QL SCN: NEGATIVE
TROPONIN T SERPL-MCNC: <0.01 NG/ML (ref 0–0.03)
TSH SERPL DL<=0.05 MIU/L-ACNC: 0.24 UIU/ML (ref 0.27–4.2)
UROBILINOGEN UR QL STRIP: NORMAL
WBC # BLD AUTO: 8.41 10*3/MM3 (ref 3.4–10.8)
WHOLE BLOOD HOLD SPECIMEN: NORMAL
WHOLE BLOOD HOLD SPECIMEN: NORMAL

## 2021-02-17 PROCEDURE — 82077 ASSAY SPEC XCP UR&BREATH IA: CPT | Performed by: NURSE PRACTITIONER

## 2021-02-17 PROCEDURE — 85379 FIBRIN DEGRADATION QUANT: CPT | Performed by: NURSE PRACTITIONER

## 2021-02-17 PROCEDURE — 85025 COMPLETE CBC W/AUTO DIFF WBC: CPT | Performed by: EMERGENCY MEDICINE

## 2021-02-17 PROCEDURE — 93005 ELECTROCARDIOGRAM TRACING: CPT | Performed by: EMERGENCY MEDICINE

## 2021-02-17 PROCEDURE — 84484 ASSAY OF TROPONIN QUANT: CPT | Performed by: NURSE PRACTITIONER

## 2021-02-17 PROCEDURE — 93010 ELECTROCARDIOGRAM REPORT: CPT | Performed by: INTERNAL MEDICINE

## 2021-02-17 PROCEDURE — 99284 EMERGENCY DEPT VISIT MOD MDM: CPT

## 2021-02-17 PROCEDURE — 80053 COMPREHEN METABOLIC PANEL: CPT | Performed by: EMERGENCY MEDICINE

## 2021-02-17 PROCEDURE — 84439 ASSAY OF FREE THYROXINE: CPT | Performed by: NURSE PRACTITIONER

## 2021-02-17 PROCEDURE — 70450 CT HEAD/BRAIN W/O DYE: CPT

## 2021-02-17 PROCEDURE — 80306 DRUG TEST PRSMV INSTRMNT: CPT | Performed by: NURSE PRACTITIONER

## 2021-02-17 PROCEDURE — 84443 ASSAY THYROID STIM HORMONE: CPT | Performed by: NURSE PRACTITIONER

## 2021-02-17 PROCEDURE — 71045 X-RAY EXAM CHEST 1 VIEW: CPT

## 2021-02-17 PROCEDURE — 81003 URINALYSIS AUTO W/O SCOPE: CPT | Performed by: NURSE PRACTITIONER

## 2021-02-17 RX ORDER — SODIUM CHLORIDE 0.9 % (FLUSH) 0.9 %
10 SYRINGE (ML) INJECTION AS NEEDED
Status: DISCONTINUED | OUTPATIENT
Start: 2021-02-17 | End: 2021-02-17 | Stop reason: HOSPADM

## 2021-02-17 RX ADMIN — SODIUM CHLORIDE 1000 ML: 9 INJECTION, SOLUTION INTRAVENOUS at 13:27

## 2021-02-17 NOTE — ED PROVIDER NOTES
Subjective   Patient is a 45-year-old female who presents here today with complaint of dizziness.  The patient reports that she has been having intermittent dizziness for 1 month now.  She states that nothing seems to bring it on, nothing seems to make it better.  States it last for several minutes and this resolved.  She states that generally it occurs when she is driving however it has occurred at her home twice.  Patient states that she was driving to work this morning when it occurred.  She states that if she is walking when it occurs she does become nauseated.  She denies any recent falls or head injuries.  The patient does have a history of Graves' disease and had a thyroid ablation.  She states she takes medicines for this.  States that when her dizziness occurs that she does have palpitations.  The patient denies any chest pain or shortness of breath with this.  She presents here today for further evaluation.      History provided by:  Patient   used: No    Dizziness  Quality:  Lightheadedness  Severity:  Moderate  Onset quality:  Sudden  Duration:  1 month  Timing:  Intermittent  Progression:  Resolved  Chronicity:  New  Context: not when bending over, not with bowel movement, not with ear pain, not with eye movement, not with head movement, not with inactivity, not with loss of consciousness, not with medication, not with physical activity, not when standing up and not when urinating    Relieved by:  Nothing  Worsened by:  Nothing  Ineffective treatments:  None tried  Associated symptoms: no blood in stool, no chest pain, no diarrhea, no headaches, no hearing loss, no nausea, no palpitations, no shortness of breath, no syncope, no tinnitus, no vision changes, no vomiting and no weakness    Risk factors: no anemia, no heart disease, no hx of stroke, no hx of vertigo, no Meniere's disease, no multiple medications and no new medications        Review of Systems   HENT: Negative for hearing  loss and tinnitus.    Respiratory: Negative for shortness of breath.    Cardiovascular: Negative for chest pain, palpitations and syncope.   Gastrointestinal: Negative for blood in stool, diarrhea, nausea and vomiting.   Neurological: Positive for dizziness. Negative for weakness and headaches.   All other systems reviewed and are negative.      Past Medical History:   Diagnosis Date   • ADD (attention deficit disorder)    • Anxiety    • Depression    • Disease of thyroid gland    • Fibromyalgia    • Fibromyalgia, primary        No Known Allergies    Past Surgical History:   Procedure Laterality Date   • ADENOIDECTOMY     • CARPAL TUNNEL RELEASE Bilateral    •  SECTION     • CHOLECYSTECTOMY     • DILATATION AND CURETTAGE     • KIDNEY STONE SURGERY     • SALPINGECTOMY Bilateral    • TONSILLECTOMY     • WISDOM TOOTH EXTRACTION         Family History   Problem Relation Age of Onset   • Stroke Father    • Thyroid disease Father    • Hypertension Brother    • Ovarian cancer Maternal Grandmother    • Colon cancer Maternal Grandmother    • Deep vein thrombosis Maternal Grandmother    • Diabetes Maternal Grandmother    • Breast cancer Paternal Aunt    • Thyroid disease Mother    • Uterine cancer Neg Hx        Social History     Socioeconomic History   • Marital status:      Spouse name: Not on file   • Number of children: Not on file   • Years of education: Not on file   • Highest education level: Not on file   Tobacco Use   • Smoking status: Former Smoker     Packs/day: 0.25     Types: Cigarettes   • Smokeless tobacco: Never Used   Substance and Sexual Activity   • Alcohol use: Yes     Comment: rarely   • Drug use: No   • Sexual activity: Yes     Partners: Male     Birth control/protection: Surgical           Objective   Physical Exam  Vitals signs and nursing note reviewed.   Constitutional:       Appearance: Normal appearance.   HENT:      Head: Normocephalic and atraumatic.      Right Ear: Tympanic  membrane, ear canal and external ear normal.      Left Ear: Tympanic membrane, ear canal and external ear normal.   Eyes:      Extraocular Movements: Extraocular movements intact.      Conjunctiva/sclera: Conjunctivae normal.      Pupils: Pupils are equal, round, and reactive to light.   Cardiovascular:      Rate and Rhythm: Normal rate and regular rhythm.   Pulmonary:      Effort: Pulmonary effort is normal.      Breath sounds: Normal breath sounds.   Abdominal:      General: Bowel sounds are normal.      Palpations: Abdomen is soft.   Skin:     General: Skin is warm and dry.      Capillary Refill: Capillary refill takes less than 2 seconds.   Neurological:      General: No focal deficit present.      Mental Status: She is alert.   Psychiatric:         Mood and Affect: Mood normal.         Procedures           ED Course  ED Course as of Feb 17 2235   Wed Feb 17, 2021 2235 Patient's work-up here has essentially been negative.  Her TSH was low at 0.240 and her free T4 was 2.03.  The patient CT scan of the head showed no acute findings.  Orthostatic vital signs were normal.  I did discuss the findings with the patient.  She is going to follow-up with her primary care provider tomorrow.  Okay to go ahead and write her a prescription for a Zio patch.  She will be discharged home at this time stable condition advised return the ER for any new or worsening symptoms.    [LF]      ED Course User Index  [LF] Janae Uribe, KATY                                   CT Head Without Contrast   Final Result   1. No acute intracranial process.           This report was finalized on 02/17/2021 15:11 by Dr. Kory Biggs MD.      XR Chest 1 View   Final Result   No active disease is seen.           This report was finalized on 02/17/2021 13:38 by Dr. Jean Carlos Fields MD.        Labs Reviewed   COMPREHENSIVE METABOLIC PANEL - Abnormal; Notable for the following components:       Result Value    BUN/Creatinine Ratio 27.1 (*)      All other components within normal limits    Narrative:     GFR Normal >60  Chronic Kidney Disease <60  Kidney Failure <15     CBC WITH AUTO DIFFERENTIAL - Abnormal; Notable for the following components:    Lymphocytes, Absolute 3.54 (*)     All other components within normal limits   TSH - Abnormal; Notable for the following components:    TSH 0.240 (*)     All other components within normal limits   T4, FREE - Abnormal; Notable for the following components:    Free T4 2.03 (*)     All other components within normal limits    Narrative:     Results may be falsely increased if patient taking Biotin.     URINALYSIS W/ CULTURE IF INDICATED - Normal    Narrative:     Urine microscopic not indicated.   TROPONIN (IN-HOUSE) - Normal    Narrative:     Troponin T Reference Range:  <= 0.03 ng/mL-   Negative for AMI  >0.03 ng/mL-     Abnormal for myocardial necrosis.  Clinicians would have to utilize clinical acumen, EKG, Troponin and serial changes to determine if it is an Acute Myocardial Infarction or myocardial injury due to an underlying chronic condition.       Results may be falsely decreased if patient taking Biotin.     D-DIMER, QUANTITATIVE - Normal    Narrative:     Reference Range is 0-0.50 mg/L FEU. However, results <0.50 mg/L FEU tends to rule out DVT or PE. Results >0.50 mg/L FEU are not useful in predicting absence or presence of DVT or PE.     URINE DRUG SCREEN - Normal    Narrative:     Cutoff For Drugs Screened:    Amphetamines               500 ng/ml  Barbiturates               200 ng/ml  Benzodiazepines            150 ng/ml  Cocaine                    150 ng/ml  Methadone                  200 ng/ml  Opiates                    100 ng/ml  Phencyclidine               25 ng/ml  THC                            50 ng/ml  Methamphetamine            500 ng/ml  Tricyclic Antidepressants  300 ng/ml  Oxycodone                  100 ng/ml  Propoxyphene               300 ng/ml  Buprenorphine               10  ng/ml    The normal value for all drugs tested is negative. This report includes unconfirmed screening results, with the cutoff values listed, to be used for medical treatment purposes only.  Unconfirmed results must not be used for non-medical purposes such as employment or legal testing.  Clinical consideration should be applied to any drug of abuse test, particularly when unconfirmed results are used.     RAINBOW DRAW    Narrative:     The following orders were created for panel order [place] Draw.  Procedure                               Abnormality         Status                     ---------                               -----------         ------                     Light Blue Top[[id number]]                                   Final result               Green Top (Gel)[[id number]]                                  Final result               Lavender Top[[id number]]                                     Final result               Red Top[[id number]]                                          Final result                 Please view results for these tests on the individual orders.   ETHANOL    Narrative:     Not for legal purposes. Chain of Custody not followed.    LIGHT BLUE TOP   GREEN TOP   LAVENDER TOP   RED TOP   CBC AND DIFFERENTIAL    Narrative:     The following orders were created for panel order CBC & Differential.  Procedure                               Abnormality         Status                     ---------                               -----------         ------                     CBC Auto Differential[[id number]]        Abnormal            Final result                 Please view results for these tests on the individual orders.             MDM  Number of Diagnoses or Management Options  Dizziness: new and requires workup  Palpitations: new and requires workup     Amount and/or Complexity of Data Reviewed  Clinical lab tests: ordered and reviewed  Tests in the radiology section of CPT®: ordered and  reviewed  Tests in the medicine section of CPT®: reviewed and ordered  Discuss the patient with other providers: yes    Patient Progress  Patient progress: stable      Final diagnoses:   Dizziness   Palpitations            Janae Uribe, APRN  02/17/21 1989

## 2021-02-18 ENCOUNTER — EPISODE CHANGES (OUTPATIENT)
Dept: CASE MANAGEMENT | Facility: OTHER | Age: 46
End: 2021-02-18

## 2021-02-19 LAB
QT INTERVAL: 390 MS
QTC INTERVAL: 414 MS

## 2021-02-22 ENCOUNTER — HOSPITAL ENCOUNTER (OUTPATIENT)
Dept: CARDIOLOGY | Facility: HOSPITAL | Age: 46
Discharge: HOME | End: 2021-02-22
Admitting: NURSE PRACTITIONER

## 2021-02-22 DIAGNOSIS — R00.2 PALPITATIONS: ICD-10-CM

## 2021-02-22 DIAGNOSIS — R42 DIZZINESS: ICD-10-CM

## 2021-02-22 PROCEDURE — 93246 EXT ECG>7D<15D RECORDING: CPT

## 2021-02-24 ENCOUNTER — OFFICE VISIT (OUTPATIENT)
Dept: FAMILY MEDICINE CLINIC | Facility: CLINIC | Age: 46
End: 2021-02-24

## 2021-02-24 VITALS
WEIGHT: 175.6 LBS | HEART RATE: 66 BPM | BODY MASS INDEX: 28.22 KG/M2 | TEMPERATURE: 97.5 F | DIASTOLIC BLOOD PRESSURE: 81 MMHG | SYSTOLIC BLOOD PRESSURE: 125 MMHG | HEIGHT: 66 IN | OXYGEN SATURATION: 98 %

## 2021-02-24 DIAGNOSIS — F98.8 ATTENTION DEFICIT DISORDER (ADD) WITHOUT HYPERACTIVITY: ICD-10-CM

## 2021-02-24 DIAGNOSIS — F41.9 ANXIETY DISORDER, UNSPECIFIED TYPE: ICD-10-CM

## 2021-02-24 DIAGNOSIS — G89.29 NECK PAIN, CHRONIC: ICD-10-CM

## 2021-02-24 DIAGNOSIS — R94.6 ABNORMAL THYROID FUNCTION TEST: ICD-10-CM

## 2021-02-24 DIAGNOSIS — H81.10 VERTIGO, BENIGN PAROXYSMAL, UNSPECIFIED LATERALITY: Primary | ICD-10-CM

## 2021-02-24 DIAGNOSIS — F41.1 GENERALIZED ANXIETY DISORDER: ICD-10-CM

## 2021-02-24 DIAGNOSIS — M79.7 FIBROMYALGIA: ICD-10-CM

## 2021-02-24 DIAGNOSIS — H53.2 DIPLOPIA: ICD-10-CM

## 2021-02-24 DIAGNOSIS — F09 COGNITIVE DISORDER: ICD-10-CM

## 2021-02-24 DIAGNOSIS — M54.2 NECK PAIN, CHRONIC: ICD-10-CM

## 2021-02-24 PROCEDURE — 99214 OFFICE O/P EST MOD 30 MIN: CPT | Performed by: FAMILY MEDICINE

## 2021-02-24 RX ORDER — DICLOFENAC SODIUM 75 MG/1
75 TABLET, DELAYED RELEASE ORAL 2 TIMES DAILY
Qty: 60 TABLET | Refills: 5 | Status: SHIPPED | OUTPATIENT
Start: 2021-02-24 | End: 2021-10-01

## 2021-02-24 RX ORDER — MECLIZINE HYDROCHLORIDE 25 MG/1
25 TABLET ORAL 3 TIMES DAILY PRN
Qty: 40 TABLET | Refills: 0 | Status: SHIPPED | OUTPATIENT
Start: 2021-02-24 | End: 2021-04-22

## 2021-02-24 RX ORDER — DULOXETIN HYDROCHLORIDE 30 MG/1
30 CAPSULE, DELAYED RELEASE ORAL DAILY
Qty: 30 CAPSULE | Refills: 0 | Status: SHIPPED | OUTPATIENT
Start: 2021-02-24 | End: 2021-03-24

## 2021-02-24 NOTE — PATIENT INSTRUCTIONS
- Go back on Voltaren for pain and increase dose from 50 mg to 75 mg   - Drop the lyrica to just one of the 150 mg pills daily and see if that changes the dizziness  - Use meclizine 1/2 tablet to 1 tablet up to three times a day for control of the dizziness, see if it makes you sleepy  - Stay on the cymbalta 30 mg daily for now for mood.  We will consider an alternative to the Cymbalta for mood if the drop in the lyrica doesn't resolve the dizziness.  - Stay off Synthroid and cytomel for the thyroid. We will recheck labs in April.  - Do not drive while having sedation or vertigo.  Get someone else to take you home from work today.

## 2021-02-24 NOTE — PROGRESS NOTES
Chief Complaint   Patient presents with   • Follow-up   • Dizziness     per patient onset x 1 month       History:  Kimberly Morgan is a 45 y.o. female who presents today for follow up on chronic anxiety, depressive symptoms, difficulty with cognitive skills and attention to tasks, is currently off adderall for about 2 months now, has stopped tramadol and Voltaren, has had recent increase of pregabalin from 100 mg 3 times a day to 150 mg 3 times a day about a month ago by her chronic fibromyalgia by Dr. Vargas who does her pain management, continues to take Klonopin just at bedtime for sleep and restlessness.  She has been off Cytomel since January 21 and off Synthroid in the past week to 10 days after TSH was suppressed and thyroid hormone levels were high.    She has been on Cymbalta again since February 3rd at 30 mg a day.  Its not helping focus, anxiety or depression and has not changed her pain at all.  She was on Cymbalta 60 mg twice a day in the past but cannot remember the effect.    We reviewed her medication list together.  We also reviewed past medications.  She was previously on Paxil in August 2020, used Wellbutrin in the spring 2020.    She started tramadol from Dr. Vargas in mid Dec.  The BID dosing didn't help her pain sx.  Voltaren helps more.  She is on 50 mg BID.  Going off the tramadol hasn't changed the dizziness.      We discussed how she is doing at work.  Still having difficulty.    Old records reviewed:  ED Provider Notes by Janae Uribe APRN (02/17/2021 13:18)  Went to ER at [facility] for progressive dizziness, had started mildly about a month ago, then progressed to severely interfering with functioning.  Episodes last several minutes, no known triggers other than rapid movement of her head.  Denies increased allergy symptoms.  Symptoms are worse when attempting to drive.  Occasional nausea.  No recent falls or head injuries.  She does have some associated palpitations.   Denies chest pain or shortness of breath.  Today she had to be led into the examination room by one of the medical assistants because of her severe dizziness.  Dizziness is better with sitting still, worse with closing her eyes, worse with rapid movements of the head.    Labs reviewed:   T4, Free (2021 12:04)  TSH (2021 12:04)  UDS Negative 2021  UA normal 2021    T4, Free (01/15/2021 11:04)  TSH (01/15/2021 11:04)  T3, Free (01/15/2021 11:04)    TSH:     Component   Ref Range & Units 9d ago 1mo ago 6mo ago 2yr ago   TSH   0.270 - 4.200 uIU/mL 0.240Low   0.010Low   0.049Low   0.077Low                  Component   Ref Range & Units 9d ago 1mo ago 6mo ago 2yr ago   Free T4   0.93 - 1.70 ng/dL 2.03High   1.90High   1.37  2.50High  R                  Imaging/other tests reviewed:  CT Head Without Contrast (2021 14:56)  - normal      ROS:  Review of Systems   Constitutional: Negative for chills, diaphoresis and fever.   HENT: Negative for congestion.    Respiratory: Negative for cough and shortness of breath.    Cardiovascular: Positive for palpitations. Negative for chest pain.   Musculoskeletal:        Going to PT for stretching and tx on neck and back myalgias- getting dry needling at PT too.   Neurological: Positive for dizziness, tremors, light-headedness and memory problem. Negative for speech difficulty and headache.   Psychiatric/Behavioral: Positive for sleep disturbance and depressed mood. The patient is nervous/anxious.        No Known Allergies    Past Medical History:   Diagnosis Date   • ADD (attention deficit disorder)    • Anxiety    • Depression    • Disease of thyroid gland    • Fibromyalgia    • Fibromyalgia, primary        Past Surgical History:   Procedure Laterality Date   • ADENOIDECTOMY     • CARPAL TUNNEL RELEASE Bilateral    •  SECTION     • CHOLECYSTECTOMY     • DILATATION AND CURETTAGE     • KIDNEY STONE SURGERY     • SALPINGECTOMY Bilateral    •  TONSILLECTOMY     • WISDOM TOOTH EXTRACTION         Family History   Problem Relation Age of Onset   • Stroke Father    • Thyroid disease Father    • Hypertension Brother    • Ovarian cancer Maternal Grandmother    • Colon cancer Maternal Grandmother    • Deep vein thrombosis Maternal Grandmother    • Diabetes Maternal Grandmother    • Breast cancer Paternal Aunt    • Thyroid disease Mother    • Uterine cancer Neg Hx        Kimberly  reports that she has quit smoking. Her smoking use included cigarettes. She smoked 0.25 packs per day. She has never used smokeless tobacco. She reports current alcohol use. She reports that she does not use drugs.    Outpatient Medications Prior to Visit   Medication Sig Dispense Refill   • baclofen (LIORESAL) 20 MG tablet Take one tablet by mouth three times a day 90 tablet 2   • Cholecalciferol (Vitamin D3) 1.25 MG (57302 UT) capsule Take 1 capsule by mouth twice weekly with food 30 capsule 1   • clonazePAM (KlonoPIN) 0.5 MG tablet Take 1 tablet by mouth Every Night. for anxiety and help with sleep. 30 tablet 2   • hydrOXYzine pamoate (VISTARIL) 25 MG capsule Take 1 capsule by mouth three times daily as needed for anxiety 30 capsule 3   • metroNIDAZOLE (METROGEL) 0.75 % gel Apply to upper cheeks and nose where redness occurs. 45 g 1   • omeprazole (PrilOSEC) 20 MG capsule Take 1 capsule by mouth Daily. 30 minutes before a meal for stomach acid control 30 capsule 5   • pregabalin (LYRICA) 150 MG capsule Take one capsule by mouth three times a day 90 capsule 1   • silver sulfadiazine (Silvadene) 1 % cream Apply  topically to the appropriate area as directed 2 (Two) Times a Day. For burn treatment 400 g 2   • traMADol (ULTRAM) 50 MG tablet Take 1-2 tablets by mouth every 8 hours as needed 45 tablet 0   • DULoxetine (Cymbalta) 30 MG capsule Take 1 capsule by mouth Daily. For control of anxiety, attention problems and muscle pains 30 capsule 0   • levothyroxine (SYNTHROID, LEVOTHROID) 150  "MCG tablet Take 1 tablet by mouth every morning on an empty stomach 90 tablet 1   • diclofenac (VOLTAREN) 50 MG EC tablet Take 1 tablet by mouth 2 (Two) Times a Day. To control pain, take with food. 60 tablet 1     No facility-administered medications prior to visit.         OBJECTIVE:  /81 (BP Location: Left arm, Patient Position: Sitting, Cuff Size: Adult)   Pulse 66   Temp 97.5 °F (36.4 °C) (Temporal)   Ht 167.6 cm (66\")   Wt 79.7 kg (175 lb 9.6 oz)   SpO2 98%   BMI 28.34 kg/m²      Physical Exam  HENT:      Ears:      Comments: Retracted TMs grey with decreased LR B, no AFLs, canals are clear     Nose: No congestion or rhinorrhea.      Mouth/Throat:      Mouth: Mucous membranes are moist.   Eyes:      General: No scleral icterus.        Right eye: No discharge.         Left eye: No discharge.      Extraocular Movements: Extraocular movements intact.      Conjunctiva/sclera: Conjunctivae normal.      Pupils: Pupils are equal, round, and reactive to light.      Comments: R upper lid seems tired and a little lagging   Cardiovascular:      Rate and Rhythm: Normal rate and regular rhythm.      Heart sounds: No murmur.   Pulmonary:      Effort: Pulmonary effort is normal. No respiratory distress.      Breath sounds: Normal breath sounds. No wheezing.   Neurological:      Mental Status: She is alert and oriented to person, place, and time.      Motor: Tremor present.      Coordination: Coordination is intact.      Comments: Fine tremor    Psychiatric:         Attention and Perception: Attention normal.         Mood and Affect: Mood is anxious. Affect is tearful.         Speech: Speech normal.         Behavior: Behavior is slowed. Behavior is cooperative.         Cognition and Memory: Cognition normal.      Comments: Good insight         Assessment/Plan    Diagnoses and all orders for this visit:    1. Vertigo, benign paroxysmal, unspecified laterality (Primary)  -     meclizine (ANTIVERT) 25 MG tablet; Take " 1 tablet by mouth 3 (Three) Times a Day As Needed for Dizziness.  Dispense: 40 tablet; Refill: 0    2. Fibromyalgia  -     diclofenac (VOLTAREN) 75 MG EC tablet; Take 1 tablet by mouth 2 (Two) Times a Day. For chronic pain and inflammation  Dispense: 60 tablet; Refill: 5  -     DULoxetine (Cymbalta) 30 MG capsule; Take 1 capsule by mouth Daily. For control of anxiety, attention problems and muscle pains  Dispense: 30 capsule; Refill: 0    3. Cognitive disorder  -     DULoxetine (Cymbalta) 30 MG capsule; Take 1 capsule by mouth Daily. For control of anxiety, attention problems and muscle pains  Dispense: 30 capsule; Refill: 0    4. Abnormal thyroid function test    5. Neck pain, chronic  -     diclofenac (VOLTAREN) 75 MG EC tablet; Take 1 tablet by mouth 2 (Two) Times a Day. For chronic pain and inflammation  Dispense: 60 tablet; Refill: 5    6. Anxiety disorder, unspecified type    7. Diplopia    8. Generalized anxiety disorder  -     DULoxetine (Cymbalta) 30 MG capsule; Take 1 capsule by mouth Daily. For control of anxiety, attention problems and muscle pains  Dispense: 30 capsule; Refill: 0    9. Attention deficit disorder (ADD) without hyperactivity  -     DULoxetine (Cymbalta) 30 MG capsule; Take 1 capsule by mouth Daily. For control of anxiety, attention problems and muscle pains  Dispense: 30 capsule; Refill: 0         Patient's Body mass index is 28.34 kg/m². BMI is above normal parameters. Recommendations include: nutrition counseling.         MDM  Number of Diagnoses or Management Options  New dx: Acute Diplopia, Vertigo  Est dx: all others listed, chronic and still problematic, causing systemic sx     Amount and/or Complexity of Data Reviewed  Labs:  yes  Records: yes  X-rays/imaging: yes  Other tests: no     Risk of Complications, Morbidity, and/or Mortality  Presenting problems:  moderate    Diagnostic procedures: Awaiting Zio Patch results.  No further testing today  Management options:  Rx, nutrition  counseling for low sodium diet to prevent vertigo    After Visit Summary was completed today for the patient.      Return in about 4 weeks (around 3/24/2021) for recheck dizziness, mood and pain. Sooner if sx aren't improving.  If worsened double vision, or acute dizziness with vomiting or fever, go back to ER.      The following instructions were given to the patient:  - Go back on Voltaren for pain and increase dose from 50 mg to 75 mg   - Drop the lyrica to just one of the 150 mg pills daily and see if that changes the dizziness  - Use meclizine 1/2 tablet to 1 tablet up to three times a day for control of the dizziness, see if it makes you sleepy  - Stay on the cymbalta 30 mg daily for now for mood.  We will consider an alternative to the Cymbalta for mood if the drop in the lyrica doesn't resolve the dizziness.  Might consider an increase in Cymbalta once the dizziness is resolved if it turns out the lyrica increase caused the vertigo.  - Stay off Synthroid and cytomel for the thyroid. We will recheck labs in April. Likely restart Synthroid then.  - Do not drive while having sedation or vertigo.  Get someone else to take you home from work today.  - Rest as much as possible.  - Follow up with Dr. Vargas's office on the fibromyalgia management and lyrica rx  - I will continue to reach Dr. Barrientos about the neuropsych testing results.    ----------------------------    Pt may need a renewed ENT referral to [facility] since she can't see Dr. Vivas anymore.  Pt may need further mental health eval and management by psychiatry.        Rosalind Quiñones M.D.  Family Medicine    2/26/2021

## 2021-03-04 DIAGNOSIS — G89.29 CHRONIC MIDLINE LOW BACK PAIN WITHOUT SCIATICA: ICD-10-CM

## 2021-03-04 DIAGNOSIS — M54.2 NECK PAIN, CHRONIC: ICD-10-CM

## 2021-03-04 DIAGNOSIS — F98.8 ATTENTION DEFICIT DISORDER (ADD) WITHOUT HYPERACTIVITY: ICD-10-CM

## 2021-03-04 DIAGNOSIS — E63.8: ICD-10-CM

## 2021-03-04 DIAGNOSIS — M25.552 CHRONIC PAIN OF BOTH HIPS: ICD-10-CM

## 2021-03-04 DIAGNOSIS — F09 COGNITIVE DISORDER: ICD-10-CM

## 2021-03-04 DIAGNOSIS — M79.7 FIBROMYALGIA: ICD-10-CM

## 2021-03-04 DIAGNOSIS — M25.551 CHRONIC PAIN OF BOTH HIPS: ICD-10-CM

## 2021-03-04 DIAGNOSIS — M54.50 CHRONIC MIDLINE LOW BACK PAIN WITHOUT SCIATICA: ICD-10-CM

## 2021-03-04 DIAGNOSIS — G47.00 INSOMNIA, UNSPECIFIED TYPE: ICD-10-CM

## 2021-03-04 DIAGNOSIS — G89.29 NECK PAIN, CHRONIC: ICD-10-CM

## 2021-03-04 DIAGNOSIS — E89.0 HYPOTHYROIDISM, POSTABLATIVE: ICD-10-CM

## 2021-03-04 DIAGNOSIS — R94.6 ABNORMAL THYROID FUNCTION TEST: ICD-10-CM

## 2021-03-04 DIAGNOSIS — R20.9 DEFICIT IN SENSORY PERCEPTION: ICD-10-CM

## 2021-03-04 DIAGNOSIS — E55.9 VITAMIN D DEFICIENCY: ICD-10-CM

## 2021-03-04 DIAGNOSIS — F41.1 GENERALIZED ANXIETY DISORDER: Primary | ICD-10-CM

## 2021-03-04 DIAGNOSIS — G89.29 CHRONIC PAIN OF BOTH HIPS: ICD-10-CM

## 2021-03-04 NOTE — PROGRESS NOTES
"Pt reports ADHD dx in her 20's, \"audio processing sensory disorder\" and special Ed in school age years, trouble holding a job, cognitive overload, poor concentration, trouble with task completion.  She has fibromyalgia, is on lyrica for pain ( just increased from 150 mg BID to TID), on klonopin for sleep and has had trials of tramadol and baclofen for her fibromyalgia.  Recent trial of Cymbalta 30 mg is not helping irritability enough, we are increasing to 60 mg today.  She has been off Adderall since late Dec 2020. Has trouble with oversedation at work, makes mistakes in counting money, has trouble following multi-step instructions and working at required speed.  She is depressed and anxious.   We sent her for neuropsych testing locally but haven't gotten reports back from Dec eval and can't get through to that office ( Dr. Mamadou Barrientos).     Pt is followed by Dr. Vargas a local physiatrist who runs a fibromyalgia clinic. She has had thyroid ablation and ENT treated her with Cytomel and Levothyroxine post op and her TSH has been suppressed with elevated T4 for 6 months or more.  We just stopped the levothyroxine as stopping the Cytomel didn't correct the suppressed TSH.  Will be rechecking TFTs shortly and considering restart of levothyroxine at lower dose.  Pt needs clinical assessment, may need further diagnostic testing, needs review of current medications and management of mood and cognitive issues.      Referral made to [facility], Mayda BURNS in [facility] for telehealth care since we have no local [facility].  "

## 2021-03-05 DIAGNOSIS — G47.00 INSOMNIA, UNSPECIFIED TYPE: ICD-10-CM

## 2021-03-05 DIAGNOSIS — F41.9 ANXIETY DISORDER, UNSPECIFIED TYPE: Primary | ICD-10-CM

## 2021-03-05 RX ORDER — HYDROXYZINE HYDROCHLORIDE 10 MG/1
TABLET, FILM COATED ORAL
Qty: 100 TABLET | Refills: 0 | Status: SHIPPED | OUTPATIENT
Start: 2021-03-05 | End: 2021-04-01

## 2021-03-18 ENCOUNTER — TELEMEDICINE (OUTPATIENT)
Dept: PSYCHIATRY | Facility: CLINIC | Age: 46
End: 2021-03-18

## 2021-03-18 ENCOUNTER — PATIENT MESSAGE (OUTPATIENT)
Dept: PSYCHIATRY | Facility: CLINIC | Age: 46
End: 2021-03-18

## 2021-03-18 DIAGNOSIS — Z86.59 HISTORY OF ATTENTION DEFICIT HYPERACTIVITY DISORDER (ADHD): ICD-10-CM

## 2021-03-18 DIAGNOSIS — G47.9 SLEEP DIFFICULTIES: ICD-10-CM

## 2021-03-18 DIAGNOSIS — F41.1 GENERALIZED ANXIETY DISORDER: ICD-10-CM

## 2021-03-18 DIAGNOSIS — F33.2 SEVERE EPISODE OF RECURRENT MAJOR DEPRESSIVE DISORDER, WITHOUT PSYCHOTIC FEATURES (HCC): Primary | ICD-10-CM

## 2021-03-18 PROCEDURE — 90792 PSYCH DIAG EVAL W/MED SRVCS: CPT | Performed by: NURSE PRACTITIONER

## 2021-03-18 RX ORDER — MIRTAZAPINE 7.5 MG/1
7.5 TABLET, FILM COATED ORAL NIGHTLY
Qty: 30 TABLET | Refills: 0 | Status: SHIPPED | OUTPATIENT
Start: 2021-03-18 | End: 2021-04-01

## 2021-03-18 RX ORDER — CLONIDINE HYDROCHLORIDE 0.1 MG/1
0.1 TABLET ORAL NIGHTLY
Qty: 30 TABLET | Refills: 0 | Status: SHIPPED | OUTPATIENT
Start: 2021-03-18 | End: 2021-04-01 | Stop reason: SDUPTHER

## 2021-03-18 RX ORDER — ATOMOXETINE 40 MG/1
40 CAPSULE ORAL DAILY
Qty: 30 CAPSULE | Refills: 0 | Status: SHIPPED | OUTPATIENT
Start: 2021-03-18 | End: 2021-04-01 | Stop reason: SDUPTHER

## 2021-03-18 NOTE — PROGRESS NOTES
This provider is located at [facility] [place] [address].The Patient is seen remotely at home, [address] [place] [address] via [employer].  Patient is being seen via telehealth and confirm that they are in a secure environment for this session. The patient's condition being diagnosed/treated is appropriate for telemedicine. The provider identified himself/herself: herself as well as her credentials.   The patient gave consent to be seen remotely, and when consent is given they understand that the consent allows for patient identifiable information to be sent to a third party as needed.   They may refuse to be seen remotely at any time. The electronic data is encrypted and password protected, and the patient has been advised of the potential risks to privacy not withstanding such measures.    You have chosen to receive care through a telehealth visit.  Do you consent to use a video/audio connection for your medical care today? Yes      Subjective   Kimberly Morgan is a 45 y.o. female who is here today for initial appointment.     Chief Complaint:  Focus and attention/depressoin/anxiety     HPI:  History of Present Illness  Patient was referred by her PCP Dr. Annette Quiñones for ADHD as well as depression and anxiety.  Patient reports that she has dealt with depression anxiety since middle school as well as ADHD with a history of several SNRIs and SNRIs as well as stimulants.  Patient states that she is currently having issues with focus and attention as well as difficulty retaining information and making mistakes regarding money as well as remembering names and recall at work.  Patient also notes that her depression and anxiety are significant see PHQ 9 and chandra 7. The patient reports depressive symptoms including depressed mood, crying spells, insomnia, overeating, feelings of guilt, feelings of hopelessness, feelings of helplessness, low energy and difficulty  concentrating, and have caused impairment in important areas of functioning.  Depression rated 7/10 with 10 being the worst.   The patient reports the following symptoms of anxiety: constant anxiety/worry, restlessness/on edge, difficulty concentrating, mind goes blank, irritability and sleep disturbance and have caused impairment in important areas of functioning.  Patient rates her anxiety as 7 on a scale 0-10 with 10 being the worst.  However reports depression is up and down based on situations.  She also states her pain significantly affects her mood.  Patient states that she is tired all the time and even with Klonopin she only averages 6 hours of sleep but some weeks are far less.  Patient reports her anxiety has caused her appetite to increase that she notes she is stress eating.  Patient denies any hypomanic or manic episodes.  Patient also notes that she is tried her daughter Strattera which was helpful in the past as well as her clonidine.  Patient states the hydroxyzine is helpful as needed with anxiety and she has noticed the few weeks she has been on duloxetine and it has been helpful with anxiety.  Informed patient that I would not prescribe an upper and a downer together patient verbalized understanding as she stated she was fine with this and would be willing to consider tapering off of the Klonopin to possibly start a stimulant as she did not want to be on several medications at once either.  Patient denied any OCD symptoms.  Patient denied any SI/HI/AH/VH.      Past Psych History: Patient reports that she has been on Klonopin 1.5 nightly for roughly 2 years, duloxetine for 2 to 3 weeks as well as hydroxyzine as needed for a couple of years.  Patient states that she is failed and tried paroxetine, citalopram, Lexapro, bupropion and venlafaxine.  Patient states that she is tried other medications for her ADHD in the past such as Vyvanse Adderall and Concerta and reports Adderall was the most  helpful.  Patient denies any hospitalization or SI attempts or self-harm.  Patient states that she had stopped the stimulant thinking she did not need it and realized that it was beneficial for her and was seeing Dr. Berg but could not get an appointment so went back to her primary doctor.  Patient also notes she was seeing Dr. Mamadou Maldonado but could not get in touch with him other.    Substance Abuse: Patient denies.  Zhen reviewed.  It is evident that the patient is prescribed Klonopin 0.5 nightly as well as gabapentin and Lyrica for history of fibromyalgia after reviewing Zhen.    Past Social History: Patient was raised in [facility].  She denies any birth defects or delays but states in the fifth or sixth grade she was just put in special education classes and graduated high school.  Patient states it was not till she was 15 that she was diagnosed with auditory processing sensory disorder and then 20s ADHD by psychologist and psychiatrist.  Patient states she had a good childhood growing up with her mother and father and denies any abuse.  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  She reports in her 20s was difficult as well as her 30s as she had 2 children and going through divorces.  Patient currently works at a [employer] and lives with her boyfriend.  Patient denies any history of abuse or any  history or legal history.    Family History:  family history includes Alcohol abuse in her paternal uncle; Bipolar disorder in her paternal uncle; Breast cancer in her paternal aunt; Colon cancer in her maternal grandmother; Deep vein thrombosis in her maternal grandmother; Diabetes in her maternal grandmother; Hypertension in her brother; Ovarian cancer in her maternal grandmother; Stroke in her father; Thyroid disease in her father and mother.    Medical/Surgical History:  Past Medical History:   Diagnosis Date    • ADD (attention deficit disorder)    • Anxiety    • Depression    • Disease of thyroid gland    • Fibromyalgia    • Fibromyalgia, primary      Past Surgical History:   Procedure Laterality Date   • ADENOIDECTOMY     • CARPAL TUNNEL RELEASE Bilateral    •  SECTION     • CHOLECYSTECTOMY     • DILATATION AND CURETTAGE     • KIDNEY STONE SURGERY     • SALPINGECTOMY Bilateral    • TONSILLECTOMY     • WISDOM TOOTH EXTRACTION         No Known Allergies    Current Medications:   Current Outpatient Medications   Medication Sig Dispense Refill   • baclofen (LIORESAL) 20 MG tablet Take one tablet by mouth three times a day 90 tablet 2   • Cholecalciferol (Vitamin D3) 1.25 MG (40731 UT) capsule Take 1 capsule by mouth twice weekly with food 30 capsule 1   • clonazePAM (KlonoPIN) 0.5 MG tablet Take 1 tablet by mouth Every Night. for anxiety and help with sleep. 30 tablet 2   • diclofenac (VOLTAREN) 75 MG EC tablet Take 1 tablet by mouth 2 (Two) Times a Day. For chronic pain and inflammation 60 tablet 5   • DULoxetine (Cymbalta) 30 MG capsule Take 1 capsule by mouth Daily. For control of anxiety, attention problems and muscle pains (Patient taking differently: Take 60 mg by mouth Daily.) 30 capsule 0   • hydrOXYzine (ATARAX) 10 MG tablet Take one to two tablets by mouth three times a day as needed for anxiety/insomnia 100 tablet 0   • levothyroxine (SYNTHROID, LEVOTHROID) 150 MCG tablet Take 1 tablet by mouth every morning on an empty stomach 90 tablet 1   • meclizine (ANTIVERT) 25 MG tablet Take 1 tablet by mouth 3 (Three) Times a Day As Needed for Dizziness. 40 tablet 0   • metroNIDAZOLE (METROGEL) 0.75 % gel Apply to upper cheeks and nose where redness occurs. 45 g 1   • omeprazole (PrilOSEC) 20 MG capsule Take 1 capsule by mouth Daily. 30 minutes before a meal for stomach acid control 30 capsule 5   • pregabalin (LYRICA) 150 MG capsule Take one capsule by mouth three times a day 90 capsule 1   • silver sulfadiazine  (Silvadene) 1 % cream Apply  topically to the appropriate area as directed 2 (Two) Times a Day. For burn treatment 400 g 2   • traMADol (ULTRAM) 50 MG tablet Take 1-2 tablets by mouth every 8 hours as needed 45 tablet 0   • vitamin D (ERGOCALCIFEROL) 1.25 MG (68098 UT) capsule capsule Take one capsule by mouth twice weekly with food 30 capsule 1   • atomoxetine (Strattera) 40 MG capsule Take 1 capsule by mouth Daily. 30 capsule 0   • cloNIDine (Catapres) 0.1 MG tablet Take 1 tablet by mouth Every Night. 30 tablet 0   • mirtazapine (REMERON) 7.5 MG tablet Take 1 tablet by mouth Every Night. 30 tablet 0     No current facility-administered medications for this visit.       Review of Systems   Psychiatric/Behavioral: Positive for decreased concentration, dysphoric mood and sleep disturbance. The patient is nervous/anxious.    All other systems reviewed and are negative.      Review of Systems - General ROS: negative for - chills, fever or malaise  Ophthalmic ROS: negative for - loss of vision  ENT ROS: negative for - hearing change  Allergy and Immunology ROS: negative for - hives  Hematological and Lymphatic ROS: negative for - bleeding problems  Endocrine ROS: negative for - skin changes  Respiratory ROS: no cough, shortness of breath, or wheezing  Cardiovascular ROS: no chest pain or dyspnea on exertion  Gastrointestinal ROS: no abdominal pain, change in bowel habits, or black or bloody stools  Genito-Urinary ROS: no dysuria, trouble voiding, or hematuria  Musculoskeletal ROS: negative for - gait disturbance  Neurological ROS: no TIA or stroke symptoms  Dermatological ROS: negative for rash    Objective   Physical Exam  Nursing note reviewed.   Constitutional:       Appearance: Normal appearance.   Neurological:      Mental Status: She is alert.   Psychiatric:         Attention and Perception: Perception normal. She is inattentive.         Mood and Affect: Mood is anxious and depressed.         Speech: Speech  normal.         Behavior: Behavior is cooperative.         Thought Content: Thought content normal.         Cognition and Memory: Cognition and memory normal.       not currently breastfeeding. Due to the remote nature of this encounter (virtual encounter), vitals were unable to be obtained.  Height stated at 66 inches.  Weight stated at 175 pounds.        Result Review :     The following data was reviewed by: KATY Gallagher on 03/18/2021:  Common labs    Common Labsle 8/26/20 8/26/20 8/26/20 1/15/21 2/17/21 2/17/21    0837 0837 0837  1204 1204   Glucose  97  63 (A)  97   BUN  13  23 (A)  19   Creatinine  0.6  0.63  0.70   eGFR Non African Am  >60  102  90   eGFR African Am  >59       Sodium  140  140  139   Potassium  3.8  3.9  3.8   Chloride  103  106  102   Calcium  9.3  9.1  9.5   Albumin  4.1  4.00  4.40   Total Bilirubin  0.3  0.2  0.4   Alkaline Phosphatase  72  71  79   AST (SGOT)  11  20  16   ALT (SGPT)  10  14  13   WBC 9.6    8.41    Hemoglobin 14.2    14.1    Hematocrit 43.4    41.5    Platelets 243    208    Total Cholesterol   211 (A)      Triglycerides   289 (A)      HDL Cholesterol   36 (A)      LDL Cholesterol    117      (A) Abnormal value       Comments are available for some flowsheets but are not being displayed.           CMP    CMP 8/26/20 1/15/21 2/17/21   Glucose 97 63 (A) 97   BUN 13 23 (A) 19   Creatinine 0.6 0.63 0.70   eGFR Non African Am >60 102 90   eGFR African Am >59     Sodium 140 140 139   Potassium 3.8 3.9 3.8   Chloride 103 106 102   Calcium 9.3 9.1 9.5   Albumin 4.1 4.00 4.40   Total Bilirubin 0.3 0.2 0.4   Alkaline Phosphatase 72 71 79   AST (SGOT) 11 20 16   ALT (SGPT) 10 14 13   (A) Abnormal value       Comments are available for some flowsheets but are not being displayed.           CBC    CBC 6/29/20 8/26/20 2/17/21   WBC 8.88 9.6 8.41   RBC 5.22 4.82 4.95   Hemoglobin 14.9 14.2 14.1   Hematocrit 45.7 43.4 41.5   MCV 87.5 90.0 83.8   MCH 28.5 29.5 28.5   MCHC 32.6  32.7 (A) 34.0   RDW 12.9 13.0 14.5   Platelets 255 243 208   (A) Abnormal value            CBC w/diff    CBC w/Diff 6/29/20 8/26/20 2/17/21   WBC 8.88 9.6 8.41   RBC 5.22 4.82 4.95   Hemoglobin 14.9 14.2 14.1   Hematocrit 45.7 43.4 41.5   MCV 87.5 90.0 83.8   MCH 28.5 29.5 28.5   MCHC 32.6 32.7 (A) 34.0   RDW 12.9 13.0 14.5   Platelets 255 243 208   Neutrophil Rel % 44.6 52.7 46.8   Immature Granulocyte Rel % 0.2  0.1   Lymphocyte Rel % 47.1 (A) 36.3 42.1   Monocyte Rel % 6.1 9.3 9.0   Eosinophil Rel % 1.5 1 1.3   Basophil Rel % 0.5 0.5 0.7   (A) Abnormal value            Lipid Panel    Lipid Panel 8/26/20   Total Cholesterol 211 (A)   Triglycerides 289 (A)   HDL Cholesterol 36 (A)   LDL Cholesterol  117   (A) Abnormal value       Comments are available for some flowsheets but are not being displayed.           TSH    TSH 8/26/20 1/15/21 2/17/21   TSH 0.049 (A) 0.010 (A) 0.240 (A)   (A) Abnormal value            Electrolytes    Electrolytes 8/26/20 1/15/21 2/17/21   Sodium 140 140 139   Potassium 3.8 3.9 3.8   Chloride 103 106 102   Calcium 9.3 9.1 9.5           UA    Urinalysis 8/26/20 12/2/20 12/2/20 2/17/21     1141 1141    Squamous Epithelial Cells, UA   3-6 (A)    Specific Gravity, UA 1.020 1.016  1.012   Ketones, UA  Negative  Negative   Blood, UA TRACE-INTACT (A) Negative  Negative   Leukocytes, UA Negative Trace (A)  Negative   Nitrite, UA Negative Negative  Negative   RBC, UA   0-2 (A)    WBC, UA   0-2 (A)    Bacteria, UA   4+ (A)    (A) Abnormal value            Data reviewed: PCP notes     Mental Status Exam:   Hygiene:   good  Cooperation:  Cooperative  Eye Contact:  Good  Psychomotor Behavior:  Appropriate  Affect:  Appropriate  Hopelessness: 5  Speech:  Normal  Thought Process:  Goal directed and Linear  Thought Content:  Normal  Suicidal:  None  Homicidal:  None  Hallucinations:  None  Delusion:  None  Memory:  Intact  Orientation:  Person, Place, Time and Situation  Reliability:  fair  Insight:   Fair  Judgement:  Fair  Impulse Control:  Fair  Physical/Medical Issues:  Yes hypothyroidism and fibroymalgia    PHQ-9 Score:   PHQ-9 Total Score: (P) 18   PHQ-9 Depression Screening  Little interest or pleasure in doing things? (P) 1   Feeling down, depressed, or hopeless? (P) 2   Trouble falling or staying asleep, or sleeping too much? (P) 3   Feeling tired or having little energy? (P) 3   Poor appetite or overeating? (P) 3   Feeling bad about yourself - or that you are a failure or have let yourself or your family down? (P) 3   Trouble concentrating on things, such as reading the newspaper or watching television? (P) 3   Moving or speaking so slowly that other people could have noticed? Or the opposite - being so fidgety or restless that you have been moving around a lot more than usual? (P) 0   Thoughts that you would be better off dead, or of hurting yourself in some way? (P) 0   PHQ-9 Total Score (P) 18   If you checked off any problems, how difficult have these problems made it for you to do your work, take care of things at home, or get along with other people?       PHQ-9 Total Score: (P) 18        Feeling nervous, anxious or on edge: Nearly every day  Not being able to stop or control worrying: Nearly every day  Worrying too much about different things: Nearly every day  Trouble Relaxing: Nearly every day  Being so restless that it is hard to sit still: Nearly every day  Feeling afraid as if something awful might happen: Nearly every day  Becoming easily annoyed or irritable: Nearly every day  JOSAFAT 7 Total Score: 21  If you checked any problems, how difficult have these problems made it for you to do your work, take care of things at home, or get along with other people: Very difficult      PROMIS scale screening tool that patient filled out virtually reviewed by this APRN at today's encounter.    Patient screened positive for depression based on a PHQ-9 score of 18 on 3/17/2021. Follow-up recommendations  include: see notes and medication list.        Assessment/Plan   Diagnoses and all orders for this visit:    1. Severe episode of recurrent major depressive disorder, without psychotic features (CMS/HCC) (Primary)    2. Generalized anxiety disorder    3. History of attention deficit hyperactivity disorder (ADHD)  -     atomoxetine (Strattera) 40 MG capsule; Take 1 capsule by mouth Daily.  Dispense: 30 capsule; Refill: 0  -     cloNIDine (Catapres) 0.1 MG tablet; Take 1 tablet by mouth Every Night.  Dispense: 30 tablet; Refill: 0    4. Sleep difficulties  -     cloNIDine (Catapres) 0.1 MG tablet; Take 1 tablet by mouth Every Night.  Dispense: 30 tablet; Refill: 0  -     mirtazapine (REMERON) 7.5 MG tablet; Take 1 tablet by mouth Every Night.  Dispense: 30 tablet; Refill: 0        TREATMENT PLAN/GOALS: Continue supportive psychotherapy efforts and medications as indicated. Treatment and medication options discussed during today's visit. Patient ackowledged and verbally consented to continue with current treatment plan and was educated on the importance of compliance with treatment and follow-up appointments.    MEDICATION ISSUES:  We discussed risks, benefits, and side effects of the above medications and the patient was agreeable with the plan. Patient was educated on the importance of compliance with treatment and follow-up appointments.  Patient is agreeable to call the office with any worsening of symptoms or onset of side effects. Patient is agreeable to call 911 or go to the nearest ER should he/she begin having SI/HI.      -Begin Strattera 40 mg daily as patient reports it was helpful in the past.  -Begin clonidine 0.1 mg at night for anxiety as well as sleep and focus.  -Begin mirtazapine 7.5 mg at night for sleep.  -Highly encouraged patient to talk with her PCP and would advise tapering off the clonazepam since she has been on for 2 years and feels that she is developing some dependency as it is no longer  effective and due to the fact that it can cause long-term memory issues.  Also encouraged the patient that I would not give a stimulant as well as a benzodiazepine together due to the risk and adverse effects patient verbalized understanding.  -Continue the Cymbalta at 60 mg daily for depression and anxiety as patient has not given it enough time to show effectiveness but notes some improvement with anxiety.  -Continue hydroxyzine per PCPs orders.      Counseled patient regarding multimodal approach with healthy nutrition, healthy sleep, regular physical activity, social activities, counseling, and medications.      Coping skills reviewed and encouraged positive framing of thoughts     Assisted patient in processing above session content; acknowledged and normalized patient’s thoughts, feelings, and concerns.  Applied  positive coping skills and behavior management in session.  Allowed patient to freely discuss issues without interruption or judgment. Provided safe, confidential environment to facilitate the development of positive therapeutic relationship and encourage open, honest communication. Assisted patient in identifying risk factors which would indicate the need for higher level of care including thoughts to harm self or others and/or self-harming behavior and encouraged patient to contact this office, call 911, or present to the nearest emergency room should any of these events occur. Discussed crisis intervention services and means to access.       We discussed risks, benefits, and side effects of the above medication and the patient was agreeable with the plan.     Return in about 2 weeks (around 4/1/2021), or if symptoms worsen or fail to improve, for Recheck.         MEDS ORDERED DURING VISIT:  New Medications Ordered This Visit   Medications   • atomoxetine (Strattera) 40 MG capsule     Sig: Take 1 capsule by mouth Daily.     Dispense:  30 capsule     Refill:  0   • cloNIDine (Catapres) 0.1 MG tablet      Sig: Take 1 tablet by mouth Every Night.     Dispense:  30 tablet     Refill:  0   • mirtazapine (REMERON) 7.5 MG tablet     Sig: Take 1 tablet by mouth Every Night.     Dispense:  30 tablet     Refill:  0           Follow Up   Return in about 2 weeks (around 4/1/2021), or if symptoms worsen or fail to improve, for Recheck.    Patient was given instructions and counseling regarding her condition or for health maintenance advice. Please see specific information pulled into the AVS if appropriate.     This document has been electronically signed by KATY Gallagher  March 18, 2021 10:51 EDT    Part of this note may be an electronic transcription/translation of spoken language to printed text using the Dragon Dictation System.

## 2021-03-18 NOTE — PATIENT INSTRUCTIONS
Major Depressive Disorder, Adult  Major depressive disorder (MDD) is a mental health condition. It may also be called clinical depression or unipolar depression. MDD causes symptoms of sadness, hopelessness, and loss of interest in things. These symptoms last most of the day, almost every day, for 2 weeks. MDD can also cause physical symptoms. It can interfere with relationships and with everyday activities, such as work, school, and activities that are usually pleasant.  MDD may be mild, moderate, or severe. It may be single-episode MDD, which happens once, or recurrent MDD, which may occur multiple times.  What are the causes?  The exact cause of this condition is not known. MDD is most likely caused by a combination of things, which may include:  · Your personality traits.  · Chilo or conditioned behaviors or thoughts or feelings that reinforce negativity.  · Any alcohol or substance misuse.  · Long-term (chronic) physical or mental health illness.  · Going through a traumatic experience or major life changes.  What increases the risk?  The following factors may make someone more likely to develop MDD:  · A family history of depression.  · Being a woman.  · Troubled family relationships.  · Abnormally low levels of certain brain chemicals.  · Traumatic or painful events in childhood, especially abuse or loss of a parent.  · A lot of stress from life experiences, such as poor living conditions or discrimination.  · Chronic physical illness or other mental health disorders.  What are the signs or symptoms?  The main symptoms of MDD usually include:  · Constant depressed or irritable mood.  · A loss of interest in things and activities.  Other symptoms include:  · Sleeping or eating too much or too little.  · Unexplained weight gain or weight loss.  · Tiredness or low energy.  · Being agitated, restless, or weak.  · Feeling hopeless, worthless, or guilty.  · Trouble thinking clearly or making  decisions.  · Thoughts of suicide or thoughts of harming others.  · Isolating oneself or avoiding other people or activities.  · Trouble completing tasks, work, or any normal obligations.  Severe symptoms of this condition may include:  · Psychotic depression.This may include false beliefs, or delusions. It may also include seeing, hearing, tasting, smelling, or feeling things that are not real (hallucinations).  · Chronic depression or persistent depressive disorder. This is low-level depression that lasts for at least 2 years.  · Melancholic depression, or feeling extremely sad and hopeless.  · Catatonic depression, which includes trouble speaking and trouble moving.  How is this diagnosed?  This condition may be diagnosed based on:  · Your symptoms.  · Your medical and mental health history. You may be asked questions about your lifestyle, including any drug and alcohol use.  · A physical exam.  · Blood tests to rule out other conditions.  MDD is confirmed if you have the following symptoms most of the day, nearly every day, in a 2-week period:  · Either a depressed mood or loss of interest.  · At least four other MDD symptoms.  How is this treated?  This condition is usually treated by mental health professionals, such as psychologists, psychiatrists, and clinical social workers. You may need more than one type of treatment. Treatment may include:  · Psychotherapy, also called talk therapy or counseling. Types of psychotherapy include:  ? Cognitive behavioral therapy (CBT). This teaches you to recognize unhealthy feelings, thoughts, and behaviors, and replace them with positive thoughts and actions.  ? Interpersonal therapy (IPT). This helps you to improve the way you communicate with others or relate to them.  ? Family therapy. This treatment includes members of your family.  · Medicines to treat anxiety and depression. These medicines help to balance the brain chemicals that affect your emotions.  · Lifestyle  changes. You may be asked to:  ? Limit alcohol use and avoid drug use.  ? Get regular exercise.  ? Get plenty of sleep.  ? Make healthy eating choices.  ? Spend more time outdoors.  · Brain stimulation. This may be done if symptoms are very severe and other treatments have not worked. Examples of this treatment are electroconvulsive therapy and transcranial magnetic stimulation.  Follow these instructions at home:  Activity  · Exercise regularly and spend time outdoors.  · Find activities that you enjoy doing, and make time to do them.  · Find healthy ways to manage stress, such as:  ? Meditation or deep breathing.  ? Spending time in nature.  ? Journaling.  · Return to your normal activities as told by your health care provider. Ask your health care provider what activities are safe for you.  Alcohol and drug use  · If you drink alcohol:  ? Limit how much you use to:  § 0-1 drink a day for women who are not pregnant.  § 0-2 drinks a day for men.  ? Be aware of how much alcohol is in your drink. In the [place] one drink equals one 12 oz bottle of beer (355 mL), one 5 oz glass of wine (148 mL), or one 1½ oz glass of hard liquor (44 mL).  ? Discuss your alcohol use with your health care provider. Alcohol can affect any antidepressant medicines you are taking.  · Discuss any drug use with your health care provider.  General instructions    · Take over-the-counter and prescription medicines only as told by your health care provider.  · Eat a healthy diet and get plenty of sleep.  · Consider joining a support group. Your health care provider may be able to recommend one.  · Keep all follow-up visits as told by your health care provider. This is important.  Where to find more information  · National [place] on Mental Illness: www.narinder.org  · [place] National [place] of Mental Health: www.nimh.nih.gov  Contact a health care provider if:  · Your symptoms get worse.  · You develop new symptoms.  Get help right away if:  · You  self-harm.  · You have serious thoughts about hurting yourself or others.  · You hallucinate.  If you ever feel like you may hurt yourself or others, or have thoughts about taking your own life, get help right away. Go to your nearest emergency department or:  · Call your local emergency services (911 in the [place]  · Call a suicide crisis helpline, such as the National Suicide Prevention Lifeline at 1-486.785.7561. This is open 24 hours a day in the [place]  · Text the Crisis Text Line at 136982 (in the [place]  Summary  · Major depressive disorder (MDD) is a mental health condition. MDD causes symptoms of sadness, hopelessness, and loss of interest in things. These symptoms last most of the day, almost every day, for 2 weeks.  · The symptoms of MDD can interfere with relationships and with everyday activities.  · Treatments and support are available for people who develop MDD. You may need more than one type of treatment.  · Get help right away if you have serious thoughts about hurting yourself or others.  This information is not intended to replace advice given to you by your health care provider. Make sure you discuss any questions you have with your health care provider.  Document Revised: 11/28/2020 Document Reviewed: 11/28/2020  [employer] Patient Education © 2021 [employer]

## 2021-03-21 PROCEDURE — 93248 EXT ECG>7D<15D REV&INTERPJ: CPT | Performed by: INTERNAL MEDICINE

## 2021-03-24 ENCOUNTER — OFFICE VISIT (OUTPATIENT)
Dept: FAMILY MEDICINE CLINIC | Facility: CLINIC | Age: 46
End: 2021-03-24

## 2021-03-24 VITALS
TEMPERATURE: 97.4 F | HEART RATE: 77 BPM | OXYGEN SATURATION: 99 % | DIASTOLIC BLOOD PRESSURE: 93 MMHG | WEIGHT: 184.8 LBS | SYSTOLIC BLOOD PRESSURE: 146 MMHG | BODY MASS INDEX: 29.7 KG/M2 | HEIGHT: 66 IN

## 2021-03-24 DIAGNOSIS — E89.0 POSTABLATIVE HYPOTHYROIDISM: ICD-10-CM

## 2021-03-24 DIAGNOSIS — F90.2 ATTENTION DEFICIT HYPERACTIVITY DISORDER (ADHD), COMBINED TYPE: ICD-10-CM

## 2021-03-24 DIAGNOSIS — M79.7 FIBROMYALGIA: ICD-10-CM

## 2021-03-24 DIAGNOSIS — G89.29 CHRONIC PAIN OF BOTH SHOULDERS: ICD-10-CM

## 2021-03-24 DIAGNOSIS — G47.00 INSOMNIA, UNSPECIFIED TYPE: ICD-10-CM

## 2021-03-24 DIAGNOSIS — M25.512 CHRONIC PAIN OF BOTH SHOULDERS: ICD-10-CM

## 2021-03-24 DIAGNOSIS — F41.9 ANXIETY DISORDER, UNSPECIFIED TYPE: Primary | ICD-10-CM

## 2021-03-24 DIAGNOSIS — M25.511 CHRONIC PAIN OF BOTH SHOULDERS: ICD-10-CM

## 2021-03-24 PROCEDURE — 99214 OFFICE O/P EST MOD 30 MIN: CPT | Performed by: FAMILY MEDICINE

## 2021-03-24 RX ORDER — DULOXETIN HYDROCHLORIDE 60 MG/1
60 CAPSULE, DELAYED RELEASE ORAL DAILY
Qty: 30 CAPSULE | Refills: 1 | Status: SHIPPED | OUTPATIENT
Start: 2021-03-24 | End: 2021-05-26 | Stop reason: SDUPTHER

## 2021-04-01 ENCOUNTER — TELEMEDICINE (OUTPATIENT)
Dept: PSYCHIATRY | Facility: CLINIC | Age: 46
End: 2021-04-01

## 2021-04-01 DIAGNOSIS — Z86.59 HISTORY OF ATTENTION DEFICIT HYPERACTIVITY DISORDER (ADHD): ICD-10-CM

## 2021-04-01 DIAGNOSIS — G47.9 SLEEP DIFFICULTIES: ICD-10-CM

## 2021-04-01 DIAGNOSIS — F33.1 MODERATE EPISODE OF RECURRENT MAJOR DEPRESSIVE DISORDER (HCC): ICD-10-CM

## 2021-04-01 DIAGNOSIS — F41.1 GENERALIZED ANXIETY DISORDER: Primary | ICD-10-CM

## 2021-04-01 PROCEDURE — 99214 OFFICE O/P EST MOD 30 MIN: CPT | Performed by: NURSE PRACTITIONER

## 2021-04-01 RX ORDER — CLONIDINE HYDROCHLORIDE 0.2 MG/1
0.2 TABLET ORAL NIGHTLY
Qty: 30 TABLET | Refills: 0 | Status: SHIPPED | OUTPATIENT
Start: 2021-04-01 | End: 2021-04-28 | Stop reason: SDUPTHER

## 2021-04-01 RX ORDER — HYDROXYZINE HYDROCHLORIDE 25 MG/1
12.5-5 TABLET, FILM COATED ORAL 3 TIMES DAILY PRN
Qty: 90 TABLET | Refills: 0 | Status: SHIPPED | OUTPATIENT
Start: 2021-04-01 | End: 2021-04-28 | Stop reason: SDUPTHER

## 2021-04-01 RX ORDER — ATOMOXETINE 40 MG/1
40 CAPSULE ORAL DAILY
Qty: 30 CAPSULE | Refills: 0 | Status: SHIPPED | OUTPATIENT
Start: 2021-04-01 | End: 2021-04-28 | Stop reason: SDUPTHER
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
## 2021-04-05 ENCOUNTER — LAB (OUTPATIENT)
Dept: LAB | Facility: HOSPITAL | Age: 46
End: 2021-04-05

## 2021-04-05 DIAGNOSIS — E55.9 VITAMIN D DEFICIENCY: ICD-10-CM

## 2021-04-05 DIAGNOSIS — F09 COGNITIVE DISORDER: ICD-10-CM

## 2021-04-05 DIAGNOSIS — F41.1 GENERALIZED ANXIETY DISORDER: ICD-10-CM

## 2021-04-05 DIAGNOSIS — E78.5 HYPERLIPIDEMIA WITH LOW HDL: ICD-10-CM

## 2021-04-05 DIAGNOSIS — E66.9 OBESITY (BMI 30-39.9): ICD-10-CM

## 2021-04-05 DIAGNOSIS — F98.8 ATTENTION DEFICIT DISORDER (ADD) WITHOUT HYPERACTIVITY: ICD-10-CM

## 2021-04-05 DIAGNOSIS — G89.29 NECK PAIN, CHRONIC: ICD-10-CM

## 2021-04-05 DIAGNOSIS — M54.2 NECK PAIN, CHRONIC: ICD-10-CM

## 2021-04-05 DIAGNOSIS — E78.6 HYPERLIPIDEMIA WITH LOW HDL: ICD-10-CM

## 2021-04-05 DIAGNOSIS — R94.6 ABNORMAL THYROID FUNCTION TEST: ICD-10-CM

## 2021-04-05 DIAGNOSIS — E89.0 HYPOTHYROIDISM, POSTABLATIVE: ICD-10-CM

## 2021-04-05 LAB
25(OH)D3 SERPL-MCNC: 76.1 NG/ML
ALBUMIN SERPL-MCNC: 4.1 G/DL (ref 3.5–5.2)
ALBUMIN/GLOB SERPL: 1.5 G/DL
ALP SERPL-CCNC: 63 U/L (ref 39–117)
ALT SERPL W P-5'-P-CCNC: 14 U/L (ref 1–33)
AMPHET+METHAMPHET UR QL: NEGATIVE
AMPHETAMINES UR QL: NEGATIVE
ANION GAP SERPL CALCULATED.3IONS-SCNC: 9.9 MMOL/L (ref 5–15)
AST SERPL-CCNC: 18 U/L (ref 1–32)
BARBITURATES UR QL SCN: NEGATIVE
BENZODIAZ UR QL SCN: NEGATIVE
BILIRUB SERPL-MCNC: 0.3 MG/DL (ref 0–1.2)
BUN SERPL-MCNC: 14 MG/DL (ref 6–20)
BUN/CREAT SERPL: 15.2 (ref 7–25)
BUPRENORPHINE SERPL-MCNC: NEGATIVE NG/ML
CALCIUM SPEC-SCNC: 8.5 MG/DL (ref 8.6–10.5)
CANNABINOIDS SERPL QL: NEGATIVE
CHLORIDE SERPL-SCNC: 99 MMOL/L (ref 98–107)
CHOLEST SERPL-MCNC: 281 MG/DL (ref 0–200)
CO2 SERPL-SCNC: 27.1 MMOL/L (ref 22–29)
COCAINE UR QL: NEGATIVE
CREAT SERPL-MCNC: 0.92 MG/DL (ref 0.57–1)
GFR SERPL CREATININE-BSD FRML MDRD: 66 ML/MIN/1.73
GLOBULIN UR ELPH-MCNC: 2.7 GM/DL
GLUCOSE SERPL-MCNC: 80 MG/DL (ref 65–99)
HDLC SERPL-MCNC: 52 MG/DL (ref 40–60)
LDLC SERPL CALC-MCNC: 180 MG/DL (ref 0–100)
LDLC/HDLC SERPL: 3.41 {RATIO}
METHADONE UR QL SCN: NEGATIVE
OPIATES UR QL: NEGATIVE
OXYCODONE UR QL SCN: NEGATIVE
PCP UR QL SCN: NEGATIVE
POTASSIUM SERPL-SCNC: 3.7 MMOL/L (ref 3.5–5.2)
PROPOXYPH UR QL: NEGATIVE
PROT SERPL-MCNC: 6.8 G/DL (ref 6–8.5)
SODIUM SERPL-SCNC: 136 MMOL/L (ref 136–145)
T3FREE SERPL-MCNC: 1.32 PG/ML (ref 2–4.4)
T4 FREE SERPL-MCNC: 0.16 NG/DL (ref 0.93–1.7)
TRICYCLICS UR QL SCN: NEGATIVE
TRIGL SERPL-MCNC: 259 MG/DL (ref 0–150)
TSH SERPL DL<=0.05 MIU/L-ACNC: 83.1 UIU/ML (ref 0.27–4.2)
VLDLC SERPL-MCNC: 49 MG/DL (ref 5–40)

## 2021-04-05 PROCEDURE — 84481 FREE ASSAY (FT-3): CPT

## 2021-04-05 PROCEDURE — 84443 ASSAY THYROID STIM HORMONE: CPT

## 2021-04-05 PROCEDURE — 80306 DRUG TEST PRSMV INSTRMNT: CPT

## 2021-04-05 PROCEDURE — 36415 COLL VENOUS BLD VENIPUNCTURE: CPT

## 2021-04-05 PROCEDURE — 82306 VITAMIN D 25 HYDROXY: CPT

## 2021-04-05 PROCEDURE — 80053 COMPREHEN METABOLIC PANEL: CPT

## 2021-04-05 PROCEDURE — 80061 LIPID PANEL: CPT

## 2021-04-05 PROCEDURE — 84439 ASSAY OF FREE THYROXINE: CPT

## 2021-04-06 ENCOUNTER — TELEPHONE (OUTPATIENT)
Dept: FAMILY MEDICINE CLINIC | Facility: CLINIC | Age: 46
End: 2021-04-06

## 2021-04-06 NOTE — TELEPHONE ENCOUNTER
Per Dr. Quevedo he would like to see the patient in the office tomorrow at 830 a.m. Patient notified and agreed to appointment date and time.

## 2021-04-06 NOTE — TELEPHONE ENCOUNTER
Appt canceled per Dr. Quevedo. Patient mentions she is taking 1/2 tablet regarding Thyroid medication per provider.

## 2021-04-06 NOTE — TELEPHONE ENCOUNTER
PATIENT CALLED BACK IN REQUESTING A CALL BACK FROM [facility]     GOOD CALL BACK   [phone]

## 2021-04-06 NOTE — TELEPHONE ENCOUNTER
Caller: Lilibeth Morgan    Relationship: Self    Best call back number: [phone]    What is the best time to reach you: ANYTIME     Who are you requesting to speak with (clinical staff, provider,  specific staff member): CLINICAL STAFF     Do you know the name of the person who called: LILIBETH     What was the call regarding: TEST RESULTS     Do you require a callback: YES

## 2021-04-22 ENCOUNTER — OFFICE VISIT (OUTPATIENT)
Dept: FAMILY MEDICINE CLINIC | Facility: CLINIC | Age: 46
End: 2021-04-22

## 2021-04-22 VITALS
HEART RATE: 68 BPM | BODY MASS INDEX: 30.18 KG/M2 | HEIGHT: 66 IN | DIASTOLIC BLOOD PRESSURE: 80 MMHG | TEMPERATURE: 97.8 F | OXYGEN SATURATION: 100 % | SYSTOLIC BLOOD PRESSURE: 118 MMHG | WEIGHT: 187.8 LBS

## 2021-04-22 DIAGNOSIS — E55.9 VITAMIN D DEFICIENCY: ICD-10-CM

## 2021-04-22 DIAGNOSIS — G47.00 INSOMNIA, UNSPECIFIED TYPE: ICD-10-CM

## 2021-04-22 DIAGNOSIS — F33.1 MAJOR DEPRESSIVE DISORDER, RECURRENT, MODERATE (HCC): ICD-10-CM

## 2021-04-22 DIAGNOSIS — E78.2 MIXED HYPERLIPIDEMIA: ICD-10-CM

## 2021-04-22 DIAGNOSIS — G47.9 SLEEP DIFFICULTIES: ICD-10-CM

## 2021-04-22 DIAGNOSIS — E66.9 OBESITY (BMI 30-39.9): ICD-10-CM

## 2021-04-22 DIAGNOSIS — M79.7 FIBROMYALGIA: ICD-10-CM

## 2021-04-22 DIAGNOSIS — F41.9 ANXIETY DISORDER, UNSPECIFIED TYPE: ICD-10-CM

## 2021-04-22 DIAGNOSIS — E89.0 HYPOTHYROIDISM, POSTABLATIVE: ICD-10-CM

## 2021-04-22 DIAGNOSIS — F09 COGNITIVE DISORDER: ICD-10-CM

## 2021-04-22 DIAGNOSIS — F98.8 ATTENTION DEFICIT DISORDER (ADD) WITHOUT HYPERACTIVITY: Primary | ICD-10-CM

## 2021-04-22 DIAGNOSIS — Z91.89 HAS POORLY BALANCED DIET: ICD-10-CM

## 2021-04-22 PROCEDURE — 99214 OFFICE O/P EST MOD 30 MIN: CPT | Performed by: FAMILY MEDICINE

## 2021-04-22 RX ORDER — ERGOCALCIFEROL 1.25 MG/1
CAPSULE ORAL
Qty: 8 CAPSULE | Refills: 5 | Status: SHIPPED | OUTPATIENT
Start: 2021-04-22 | End: 2022-01-25
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
## 2021-04-28 ENCOUNTER — PRIOR AUTHORIZATION (OUTPATIENT)
Dept: PSYCHIATRY | Facility: CLINIC | Age: 46
End: 2021-04-28

## 2021-04-28 ENCOUNTER — TELEMEDICINE (OUTPATIENT)
Dept: PSYCHIATRY | Facility: CLINIC | Age: 46
End: 2021-04-28

## 2021-04-28 DIAGNOSIS — F33.1 MODERATE EPISODE OF RECURRENT MAJOR DEPRESSIVE DISORDER (HCC): Primary | Chronic | ICD-10-CM

## 2021-04-28 DIAGNOSIS — F41.1 GENERALIZED ANXIETY DISORDER: Chronic | ICD-10-CM

## 2021-04-28 DIAGNOSIS — Z86.59 HISTORY OF ATTENTION DEFICIT HYPERACTIVITY DISORDER (ADHD): ICD-10-CM

## 2021-04-28 DIAGNOSIS — G47.9 SLEEP DIFFICULTIES: ICD-10-CM

## 2021-04-28 PROCEDURE — 99214 OFFICE O/P EST MOD 30 MIN: CPT | Performed by: NURSE PRACTITIONER

## 2021-04-28 RX ORDER — HYDROXYZINE HYDROCHLORIDE 25 MG/1
12.5-5 TABLET, FILM COATED ORAL 3 TIMES DAILY PRN
Qty: 90 TABLET | Refills: 0 | Status: SHIPPED | OUTPATIENT
Start: 2021-04-28 | End: 2021-05-26

## 2021-04-28 RX ORDER — ATOMOXETINE 60 MG/1
60 CAPSULE ORAL DAILY
Qty: 30 CAPSULE | Refills: 0 | Status: SHIPPED | OUTPATIENT
Start: 2021-04-28 | End: 2021-05-26 | Stop reason: SDUPTHER

## 2021-04-28 RX ORDER — CLONIDINE HYDROCHLORIDE 0.2 MG/1
0.2 TABLET ORAL NIGHTLY
Qty: 30 TABLET | Refills: 0 | Status: SHIPPED | OUTPATIENT
Start: 2021-04-28 | End: 2021-05-26 | Stop reason: SDUPTHER
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
## 2021-05-17 ENCOUNTER — LAB (OUTPATIENT)
Dept: LAB | Facility: HOSPITAL | Age: 46
End: 2021-05-17

## 2021-05-17 DIAGNOSIS — E89.0 HYPOTHYROIDISM, POSTABLATIVE: ICD-10-CM

## 2021-05-17 PROCEDURE — 36415 COLL VENOUS BLD VENIPUNCTURE: CPT

## 2021-05-17 PROCEDURE — 84443 ASSAY THYROID STIM HORMONE: CPT

## 2021-05-17 PROCEDURE — 84439 ASSAY OF FREE THYROXINE: CPT

## 2021-05-18 DIAGNOSIS — E89.0 HYPOTHYROIDISM, POSTABLATIVE: Primary | ICD-10-CM

## 2021-05-18 DIAGNOSIS — R94.6 ABNORMAL THYROID FUNCTION TEST: ICD-10-CM

## 2021-05-18 LAB
T4 FREE SERPL-MCNC: 0.94 NG/DL (ref 0.93–1.7)
TSH SERPL DL<=0.05 MIU/L-ACNC: 46.5 UIU/ML (ref 0.27–4.2)

## 2021-05-19 DIAGNOSIS — G47.9 SLEEP DIFFICULTIES: ICD-10-CM

## 2021-05-19 DIAGNOSIS — Z86.59 HISTORY OF ATTENTION DEFICIT HYPERACTIVITY DISORDER (ADHD): ICD-10-CM

## 2021-05-19 RX ORDER — CLONIDINE HYDROCHLORIDE 0.2 MG/1
0.2 TABLET ORAL NIGHTLY
Qty: 30 TABLET | Refills: 0 | Status: CANCELLED | OUTPATIENT
Start: 2021-05-17

## 2021-05-21 DIAGNOSIS — G47.9 SLEEP DIFFICULTIES: ICD-10-CM

## 2021-05-21 DIAGNOSIS — Z86.59 HISTORY OF ATTENTION DEFICIT HYPERACTIVITY DISORDER (ADHD): ICD-10-CM

## 2021-05-21 RX ORDER — CLONIDINE HYDROCHLORIDE 0.2 MG/1
0.2 TABLET ORAL NIGHTLY
Qty: 30 TABLET | Refills: 0 | Status: CANCELLED | OUTPATIENT
Start: 2021-05-17

## 2021-05-26 ENCOUNTER — TELEMEDICINE (OUTPATIENT)
Dept: PSYCHIATRY | Facility: CLINIC | Age: 46
End: 2021-05-26

## 2021-05-26 DIAGNOSIS — Z86.59 HISTORY OF ATTENTION DEFICIT HYPERACTIVITY DISORDER (ADHD): ICD-10-CM

## 2021-05-26 DIAGNOSIS — F33.1 MODERATE EPISODE OF RECURRENT MAJOR DEPRESSIVE DISORDER (HCC): Primary | ICD-10-CM

## 2021-05-26 DIAGNOSIS — F41.1 GENERALIZED ANXIETY DISORDER: ICD-10-CM

## 2021-05-26 DIAGNOSIS — G47.9 SLEEP DIFFICULTIES: ICD-10-CM

## 2021-05-26 PROCEDURE — 99214 OFFICE O/P EST MOD 30 MIN: CPT | Performed by: NURSE PRACTITIONER

## 2021-05-26 RX ORDER — ATOMOXETINE 60 MG/1
60 CAPSULE ORAL DAILY
Qty: 90 CAPSULE | Refills: 0 | Status: SHIPPED | OUTPATIENT
Start: 2021-05-26 | End: 2021-08-10 | Stop reason: SDUPTHER

## 2021-05-26 RX ORDER — CLONIDINE HYDROCHLORIDE 0.2 MG/1
0.2 TABLET ORAL NIGHTLY
Qty: 90 TABLET | Refills: 0 | Status: SHIPPED | OUTPATIENT
Start: 2021-05-26 | End: 2021-08-10 | Stop reason: SDUPTHER

## 2021-05-26 RX ORDER — DULOXETIN HYDROCHLORIDE 60 MG/1
60 CAPSULE, DELAYED RELEASE ORAL DAILY
Qty: 90 CAPSULE | Refills: 0 | Status: SHIPPED | OUTPATIENT
Start: 2021-05-26 | End: 2021-08-10 | Stop reason: SDUPTHER
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
## 2021-05-27 ENCOUNTER — APPOINTMENT (OUTPATIENT)
Dept: NUTRITION | Facility: HOSPITAL | Age: 46
End: 2021-05-27

## 2021-06-24 ENCOUNTER — LAB (OUTPATIENT)
Dept: LAB | Facility: HOSPITAL | Age: 46
End: 2021-06-24

## 2021-06-24 DIAGNOSIS — E89.0 HYPOTHYROIDISM, POSTABLATIVE: ICD-10-CM

## 2021-06-24 DIAGNOSIS — R94.6 ABNORMAL THYROID FUNCTION TEST: ICD-10-CM

## 2021-06-24 LAB
T4 FREE SERPL-MCNC: 1.77 NG/DL (ref 0.93–1.7)
T4 SERPL-MCNC: 8.48 MCG/DL (ref 4.5–11.7)

## 2021-06-24 PROCEDURE — 36415 COLL VENOUS BLD VENIPUNCTURE: CPT

## 2021-06-24 PROCEDURE — 84436 ASSAY OF TOTAL THYROXINE: CPT

## 2021-06-24 PROCEDURE — 84439 ASSAY OF FREE THYROXINE: CPT

## 2021-06-29 ENCOUNTER — TELEMEDICINE (OUTPATIENT)
Dept: FAMILY MEDICINE CLINIC | Age: 46
End: 2021-06-29

## 2021-06-29 DIAGNOSIS — R21 RASH OF FACE: Primary | ICD-10-CM

## 2021-06-29 DIAGNOSIS — B02.9 HERPES ZOSTER WITHOUT COMPLICATION: ICD-10-CM

## 2021-06-29 PROCEDURE — 99213 OFFICE O/P EST LOW 20 MIN: CPT | Performed by: NURSE PRACTITIONER

## 2021-06-29 RX ORDER — VALACYCLOVIR HYDROCHLORIDE 500 MG/1
1000 TABLET, FILM COATED ORAL 3 TIMES DAILY
Qty: 42 TABLET | Refills: 0 | Status: SHIPPED | OUTPATIENT
Start: 2021-06-29 | End: 2021-07-06

## 2021-06-29 RX ORDER — PREDNISONE 20 MG/1
20 TABLET ORAL 2 TIMES DAILY
Qty: 6 TABLET | Refills: 0 | Status: SHIPPED | OUTPATIENT
Start: 2021-06-29 | End: 2021-07-02

## 2021-06-29 NOTE — PATIENT INSTRUCTIONS
Shingles    Shingles is an infection. It gives you a painful skin rash and blisters that have fluid in them. Shingles is caused by the same germ (virus) that causes chickenpox.  Shingles only happens in people who:  · Have had chickenpox.  · Have been given a shot of medicine (vaccine) to protect against chickenpox. Shingles is rare in this group.  The first symptoms of shingles may be itching, tingling, or pain in an area on your skin. A rash will show on your skin a few days or weeks later. The rash is likely to be on one side of your body. The rash usually has a shape like a belt or a band. Over time, the rash turns into fluid-filled blisters. The blisters will break open, change into scabs, and dry up. Medicines may:  · Help with pain and itching.  · Help you get better sooner.  · Help to prevent long-term problems.  Follow these instructions at home:  Medicines  · Take over-the-counter and prescription medicines only as told by your doctor.  · Put on an anti-itch cream or numbing cream where you have a rash, blisters, or scabs. Do this as told by your doctor.  Helping with itching and discomfort    · Put cold, wet cloths (cold compresses) on the area of the rash or blisters as told by your doctor.  · Cool baths can help you feel better. Try adding baking soda or dry oatmeal to the water to lessen itching. Do not bathe in hot water.  Blister and rash care  · Keep your rash covered with a loose bandage (dressing).  · Wear loose clothing that does not rub on your rash.  · Keep your rash and blisters clean. To do this, wash the area with mild soap and cool water as told by your doctor.  · Check your rash every day for signs of infection. Check for:  ? More redness, swelling, or pain.  ? Fluid or blood.  ? Warmth.  ? Pus or a bad smell.  · Do not scratch your rash. Do not pick at your blisters. To help you to not scratch:  ? Keep your fingernails clean and cut short.  ? Wear gloves or mittens when you sleep, if  scratching is a problem.  General instructions  · Rest as told by your doctor.  · Keep all follow-up visits as told by your doctor. This is important.  · Wash your hands often with soap and water. If soap and water are not available, use hand . Doing this lowers your chance of getting a skin infection caused by germs (bacteria).  · Your infection can cause chickenpox in people who have never had chickenpox or never got a shot of chickenpox vaccine. If you have blisters that did not change into scabs yet, try not to touch other people or be around other people, especially:  ? Babies.  ? Pregnant women.  ? Children who have areas of red, itchy, or rough skin (eczema).  ? Very old people who have transplants.  ? People who have a long-term (chronic) sickness, like cancer or AIDS.  Contact a doctor if:  · Your pain does not get better with medicine.  · Your pain does not get better after the rash heals.  · You have any signs of infection in the rash area. These signs include:  ? More redness, swelling, or pain around the rash.  ? Fluid or blood coming from the rash.  ? The rash area feeling warm to the touch.  ? Pus or a bad smell coming from the rash.  Get help right away if:  · The rash is on your face or nose.  · You have pain in your face or pain by your eye.  · You lose feeling on one side of your face.  · You have trouble seeing.  · You have ear pain, or you have ringing in your ear.  · You have a loss of taste.  · Your condition gets worse.  Summary  · Shingles gives you a painful skin rash and blisters that have fluid in them.  · Shingles is an infection. It is caused by the same germ (virus) that causes chickenpox.  · Keep your rash covered with a loose bandage (dressing). Wear loose clothing that does not rub on your rash.  · If you have blisters that did not change into scabs yet, try not to touch other people or be around people.  This information is not intended to replace advice given to you by  your health care provider. Make sure you discuss any questions you have with your health care provider.  Document Revised: 04/10/2020 Document Reviewed: 08/22/2018  Elsevier Patient Education © 2021 [employer]    Rash, Adult    A rash is a change in the color of your skin. A rash can also change the way your skin feels. There are many different conditions and factors that can cause a rash.  Follow these instructions at home:  The goal of treatment is to stop the itching and keep the rash from spreading. Watch for any changes in your symptoms. Let your doctor know about them. Follow these instructions to help with your condition:  Medicine  Take or apply over-the-counter and prescription medicines only as told by your doctor. These may include medicines:  · To treat red or swollen skin (corticosteroid creams).  · To treat itching.  · To treat an allergy (oral antihistamines).  · To treat very bad symptoms (oral corticosteroids).    Skin care  · Put cool cloths (compresses) on the affected areas.  · Do not scratch or rub your skin.  · Avoid covering the rash. Make sure that the rash is exposed to air as much as possible.  Managing itching and discomfort  · Avoid hot showers or baths. These can make itching worse. A cold shower may help.  · Try taking a bath with:  ? Epsom salts. You can get these at your local pharmacy or grocery store. Follow the instructions on the package.  ? Baking soda. Pour a small amount into the bath as told by your doctor.  ? Colloidal oatmeal. You can get this at your local pharmacy or grocery store. Follow the instructions on the package.  · Try putting baking soda paste onto your skin. Stir water into baking soda until it gets like a paste.  · Try putting on a lotion that relieves itchiness (calamine lotion).  · Keep cool and out of the sun. Sweating and being hot can make itching worse.  General instructions    · Rest as needed.  · Drink enough fluid to keep your pee (urine) pale  "yellow.  · Wear loose-fitting clothing.  · Avoid scented soaps, detergents, and perfumes. Use gentle soaps, detergents, perfumes, and other cosmetic products.  · Avoid anything that causes your rash. Keep a journal to help track what causes your rash. Write down:  ? What you eat.  ? What cosmetic products you use.  ? What you drink.  ? What you wear. This includes jewelry.  · Keep all follow-up visits as told by your doctor. This is important.  Contact a doctor if:  · You sweat at night.  · You lose weight.  · You pee (urinate) more than normal.  · You pee less than normal, or you notice that your pee is a darker color than normal.  · You feel weak.  · You throw up (vomit).  · Your skin or the whites of your eyes look yellow (jaundice).  · Your skin:  ? Tingles.  ? Is numb.  · Your rash:  ? Does not go away after a few days.  ? Gets worse.  · You are:  ? More thirsty than normal.  ? More tired than normal.  · You have:  ? New symptoms.  ? Pain in your belly (abdomen).  ? A fever.  ? Watery poop (diarrhea).  Get help right away if:  · You have a fever and your symptoms suddenly get worse.  · You start to feel mixed up (confused).  · You have a very bad headache or a stiff neck.  · You have very bad joint pains or stiffness.  · You have jerky movements that you cannot control (seizure).  · Your rash covers all or most of your body. The rash may or may not be painful.  · You have blisters that:  ? Are on top of the rash.  ? Grow larger.  ? Grow together.  ? Are painful.  ? Are inside your nose or mouth.  · You have a rash that:  ? Looks like purple pinprick-sized spots all over your body.  ? Has a \"bull's eye\" or looks like a target.  ? Is red and painful, causes your skin to peel, and is not from being in the sun too long.  Summary  · A rash is a change in the color of your skin. A rash can also change the way your skin feels.  · The goal of treatment is to stop the itching and keep the rash from spreading.  · Take or " apply over-the-counter and prescription medicines only as told by your doctor.  · Contact a doctor if you have new symptoms or symptoms that get worse.  · Keep all follow-up visits as told by your doctor. This is important.  This information is not intended to replace advice given to you by your health care provider. Make sure you discuss any questions you have with your health care provider.  Document Revised: 04/10/2020 Document Reviewed: 07/22/2019  [employer] Patient Education © 2021 [employer]      If you begin feeling tingling, burning, pain or change in vision of left eye, follow up with eye doctor as soon as possible for evaluation.

## 2021-06-29 NOTE — PROGRESS NOTES
Chief Complaint  Rash    Subjective          Kimberly Morgan presents to [facility]  Rash  This is a new problem. The current episode started in the past 7 days. The problem has been gradually worsening since onset. The affected locations include the face, lips and neck (left side). The rash is characterized by blistering, burning and itchiness. She was exposed to a new detergent/soap (possible allergic reaction to face wash). Past treatments include topical steroids and antihistamine. The treatment provided mild relief. Her past medical history is significant for varicella.       Objective   Vital Signs:   There were no vitals taken for this visit.    Physical Exam  Constitutional:       Appearance: Normal appearance.   HENT:      Head: Normocephalic.   Eyes:      Pupils: Pupils are equal, round, and reactive to light.   Musculoskeletal:      Cervical back: Normal range of motion.   Skin:     Findings: Rash present. Rash is crusting and vesicular.             Comments: Rash appears along left side trigeminal nerve route chin, lips, cheek with burning and itching   Neurological:      Mental Status: She is alert and oriented to person, place, and time.   Psychiatric:         Mood and Affect: Mood normal.        Result Review :                 Assessment and Plan    Diagnoses and all orders for this visit:    1. Rash of face (Primary)  -     predniSONE (DELTASONE) 20 MG tablet; Take 1 tablet by mouth 2 (Two) Times a Day for 3 days. Take with food  Dispense: 6 tablet; Refill: 0    2. Herpes zoster without complication  -     valACYclovir (VALTREX) 500 MG tablet; Take 2 tablets by mouth 3 (Three) Times a Day for 7 days.  Dispense: 42 tablet; Refill: 0        Follow Up   Return if symptoms worsen or fail to improve.  Patient was given instructions and counseling regarding her condition or for health maintenance advice. Please see specific information pulled into the AVS if appropriate.

## 2021-06-30 ENCOUNTER — OFFICE VISIT (OUTPATIENT)
Dept: FAMILY MEDICINE CLINIC | Facility: CLINIC | Age: 46
End: 2021-06-30

## 2021-06-30 VITALS
WEIGHT: 182.2 LBS | BODY MASS INDEX: 29.28 KG/M2 | TEMPERATURE: 96.5 F | DIASTOLIC BLOOD PRESSURE: 78 MMHG | SYSTOLIC BLOOD PRESSURE: 120 MMHG | OXYGEN SATURATION: 98 % | HEIGHT: 66 IN | HEART RATE: 71 BPM

## 2021-06-30 DIAGNOSIS — R94.6 ABNORMAL THYROID FUNCTION TEST: Primary | ICD-10-CM

## 2021-06-30 DIAGNOSIS — G47.9 SLEEP DIFFICULTIES: ICD-10-CM

## 2021-06-30 DIAGNOSIS — F41.1 GENERALIZED ANXIETY DISORDER: ICD-10-CM

## 2021-06-30 DIAGNOSIS — F98.8 ATTENTION DEFICIT DISORDER (ADD) WITHOUT HYPERACTIVITY: ICD-10-CM

## 2021-06-30 DIAGNOSIS — B02.9 HERPES ZOSTER WITHOUT COMPLICATION: ICD-10-CM

## 2021-06-30 DIAGNOSIS — F09 COGNITIVE DISORDER: ICD-10-CM

## 2021-06-30 DIAGNOSIS — E89.0 HYPOTHYROIDISM, POSTABLATIVE: ICD-10-CM

## 2021-06-30 DIAGNOSIS — R00.2 PALPITATIONS: ICD-10-CM

## 2021-06-30 PROCEDURE — 99214 OFFICE O/P EST MOD 30 MIN: CPT | Performed by: FAMILY MEDICINE

## 2021-06-30 RX ORDER — PROPRANOLOL HYDROCHLORIDE 10 MG/1
10 TABLET ORAL 3 TIMES DAILY PRN
Qty: 90 TABLET | Refills: 1 | Status: SHIPPED | OUTPATIENT
Start: 2021-06-30 | End: 2021-10-22 | Stop reason: SDUPTHER

## 2021-06-30 RX ORDER — HYDROXYZINE HYDROCHLORIDE 25 MG/1
25 TABLET, FILM COATED ORAL 3 TIMES DAILY PRN
Qty: 90 TABLET | Refills: 1 | Status: SHIPPED | OUTPATIENT
Start: 2021-06-30 | End: 2021-10-12 | Stop reason: SDUPTHER

## 2021-07-15 ENCOUNTER — HOSPITAL ENCOUNTER (OUTPATIENT)
Dept: NUTRITION | Facility: HOSPITAL | Age: 46
Discharge: HOME | End: 2021-07-15

## 2021-07-15 ENCOUNTER — APPOINTMENT (OUTPATIENT)
Dept: NUTRITION | Facility: HOSPITAL | Age: 46
End: 2021-07-15

## 2021-07-15 ENCOUNTER — LAB (OUTPATIENT)
Dept: LAB | Facility: HOSPITAL | Age: 46
End: 2021-07-15

## 2021-07-15 VITALS — BODY MASS INDEX: 30.05 KG/M2 | WEIGHT: 187 LBS | HEIGHT: 66 IN

## 2021-07-15 DIAGNOSIS — E89.0 HYPOTHYROIDISM, POSTABLATIVE: ICD-10-CM

## 2021-07-15 DIAGNOSIS — R94.6 ABNORMAL THYROID FUNCTION TEST: ICD-10-CM

## 2021-07-15 LAB
T4 FREE SERPL-MCNC: 2.31 NG/DL (ref 0.93–1.7)
TSH SERPL DL<=0.05 MIU/L-ACNC: 0.57 UIU/ML (ref 0.27–4.2)

## 2021-07-15 PROCEDURE — 84439 ASSAY OF FREE THYROXINE: CPT

## 2021-07-15 PROCEDURE — 97802 MEDICAL NUTRITION INDIV IN: CPT

## 2021-07-15 PROCEDURE — 84443 ASSAY THYROID STIM HORMONE: CPT

## 2021-07-15 PROCEDURE — 36415 COLL VENOUS BLD VENIPUNCTURE: CPT

## 2021-07-15 NOTE — PROGRESS NOTES
"Adult Outpatient Nutrition  Assessment/PES    Patient Name:  Kimberly Morgan  YOB: 1975  MRN: [id number]    Assessment Date:  7/15/2021        General Info             Today's Session    Person(s) attending today's session  Patient       General Information    Preferred Language  English    Lives With  significant other;child(neeraj), dependent lives with boyfriend, and 3 dependent children (13yo, 14yo, 9yo)        Physical Findings              Physical Findings    Overall Physical Appearance  overweight     Gastrointestinal  other (see comments) reports irregular bowel habits         Anthropometrics              Anthropometrics    Height  167.6 cm (66\")     Weight  84.8 kg (187 lb)        Ideal Body Weight (IBW)    Ideal Body Weight (IBW) (kg)  59.58     % Ideal Body Weight  142.38        Body Mass Index (BMI)    BMI (kg/m2)  30.25         Nutritional Info/Activity             Nutritional Information    What is your desired body weight?  59 kg (130 lb) goal wt range 130-150 lbs    Have you tried to lose weight before?  Yes    List programs tried, date, and success  has not had success with anything.    What is your motivation to lose weight?  wants to look and feel better and be an example for her 13yo daughter.    Do you have difficulty chewing food?  No    Functional Status  able to prepare meals;able to purchase food;ambulatory do have budget constraints on purchasing food    List any food cravings/trigger foods you have  sweets/cookies/chips    How often during the day do you find yourself snacking?  infrequently but does have temptations at work and home. Works in the  industry    Food Behaviors  Stress eater    How often do you eat out and where?  Lunch M-F is eating in hospital cafe/deli    Do you use Food Assistance programs (WIC, food stamps, food bank)?  no is not eligible for these resources per her report    How many times do you drink milk per day?  0    How many times do " "you eat fruit per day?  1 sometimes banana/grape at lunch time    How many times do you eat vegetables per day?  0 while she enjoys vegetables they are not a regular part of her intake    How many diet sodas do you drink per day?  -- Drinks several \"zero\" sodas throughout the day    How many regular sodas do you drink per day?  0    How many meals do you eat each day?  1    What is the biggest challenge you have with your diet?  Poor choices;Knowledge;Weight maintenance;Food preperation;Financial burden of food    What type of support do you currently use to help you with your health issues?  13yo daughter is supportive; lacks support of boyfriend and other children when it comes to healthy eating       Eating Environment    Eating environment  Family;Work;Alone       Physical Activity    Are you currently involved in an activity/exercise program?   No    Reasons for Inactivity  Other (comment) was going to the [facility] however is unable to fit gym time into her schedule currently        Home Nutrition Report              Home Nutrition Report    Typical Food/Fluid Intake  usually skips brkfst, might eat eggs or honey nut cheerios; lunch is foods that are prepared in hospital cafe/deli, usually mozzarella sticks; typically skips dinner, possibly has a burger, frozen pizza, snack/processed foods available.     Meal/Snack Patterns  very limited cooking at home. very limited intake of whole, unprocessed foods.         Estimated/Assessed Needs              Calculation Measurements    Weight Used For Calculations  84.8 kg (187 lb)     Height  167.6 cm (66\")        Estimated/Assessed Needs    Additional Documentation  Calorie Requirements (Group);Saunders-St. Jeor Equation (Group)        Calorie Requirements    Weight Used For Calorie Calculations  84.8 kg (187 lb)     Estimated Calorie Requirement Comment  MSF AF 1.4 -[id number] kcal for wt loss =1614 to 1864        Saunders-St. Jeor Equation    RMR (Saunders-St. Jeor Equation)  " 1509.98     [facility] Activity Factors  1.4 - 1.5     Activity Factors ([facility])  1482.976 - 6684.97             Problem/Interventions:  Problem 1              Nutrition Diagnoses Problem 1    Problem 1  Overweight/Obesity     Etiology (related to)  Factors Affecting Nutrition     Food Habit/Preferences  Fast Food     Poor Intake of  Fruit;Vegetables;Fresh Fruit/Vegetables;Breakfast;Dinner     Best Intake of  Fast Food     Other  limited to no physical activity outside of active job     Signs/Symptoms (evidenced by)  BMI     BMI  30 - 34.9             RDN Action     Recommendation Diet; Snack   Education/Counseling Comprehensive; Ongoing reinforcement; Progressive counseling   Strategies Used Motivational interviewing; Goal setting; Problem solving; Stress management   Patient to Start Food Diary Other (comment): logging food has not been helpful in the past, in fact created more anxiety around food behavoirs, not recommended at this time.             Intervention Goal              Intervention Goal    General  Provide information regarding MNT for treatment/condition;Reduce/improve symptoms;Meet nutritional needs for age/condition     PO  Increase intake;Meet estimated needs increase intake of nutrient dense foods     Weight  Appropriate weight loss             Education/Evaluation              Education    Education  Provided education regarding;Education topics;Advised regarding habits/behavior     Provided education regarding  Diet rationale;Key food habit change;Nutrition related factor;Preventative health     Education Topics  Basic nutrition;Fiber;Low fat;Gradual weight loss, Eating Healthy on a Budget     Advised Regarding Habits/Behavior  Activity;Food prep;Appropriate beverage;Self monitor;Appropriate portions;Snacks;Eating pattern;Food choices;Meal planning;Increased nutrient density;Other (comment) Mindful eating, Hunger scale, mindset changes to promote behavior change        Goal  Setting Goal 1   Specify Goal 1 Eat nutrient dense foods 4 times daily.   Goal 1 Measure: (what/when)  brkfst (protein/dairy paired with a fruit or starch), lunch a protein and a vegetable, afternoon snack- non processed whole foods (i.e. trail mix with nuts,seeds,dried fruit), dinner- foods cooked at home           Monitor/Evaluation    Monitor  Per protocol     Education Follow-up  [employer]N provided written materials and RD contact info if further questions arise           Electronically signed by:  Cristina Vazquez RDN, [place]  07/15/21 16:15 CDT

## 2021-07-19 ENCOUNTER — TELEMEDICINE (OUTPATIENT)
Dept: PSYCHIATRY | Facility: CLINIC | Age: 46
End: 2021-07-19

## 2021-07-19 DIAGNOSIS — Z86.59 HISTORY OF ATTENTION DEFICIT HYPERACTIVITY DISORDER (ADHD): ICD-10-CM

## 2021-07-19 DIAGNOSIS — F41.1 GENERALIZED ANXIETY DISORDER: Chronic | ICD-10-CM

## 2021-07-19 DIAGNOSIS — F33.1 MODERATE EPISODE OF RECURRENT MAJOR DEPRESSIVE DISORDER (HCC): Chronic | ICD-10-CM

## 2021-07-19 DIAGNOSIS — G47.9 SLEEP DIFFICULTIES: ICD-10-CM

## 2021-07-19 PROCEDURE — 99214 OFFICE O/P EST MOD 30 MIN: CPT | Performed by: NURSE PRACTITIONER

## 2021-07-19 RX ORDER — TRAZODONE HYDROCHLORIDE 50 MG/1
25-50 TABLET ORAL NIGHTLY PRN
Qty: 30 TABLET | Refills: 0 | Status: SHIPPED | OUTPATIENT
Start: 2021-07-19 | End: 2021-08-10 | Stop reason: SDUPTHER

## 2021-07-19 NOTE — PROGRESS NOTES
This provider is located at [facility], [address] [place] [address].The Patient is seen remotely at home, [address] [place] [address] via [email or web address]. Patient is being seen via telehealth and confirm that they are in a secure environment for this session. The patient's condition being diagnosed/treated is appropriate for telemedicine. The provider identified himself/herself: herself as well as her credentials.   The patient gave consent to be seen remotely, and when consent is given they understand that the consent allows for patient identifiable information to be sent to a third party as needed.   They may refuse to be seen remotely at any time. The electronic data is encrypted and password protected, and the patient has been advised of the potential risks to privacy not withstanding such measures.    You have chosen to receive care through a telehealth visit.  Do you consent to use a video/audio connection for your medical care today? Yes    Chief Complaint  Focus and mood     Subjective    Kimberly Morgan presents to [facility] for medication management.     History of Present Illness   Patient presents today stating that anxiety is worse than normal, with 8/10 on a scale, but some days it can be less stating today as a 6 or 7.  She reports being more irritable. Patient states that memory has been worse due to anxiety. She states that she has trouble falling asleep and takes about 20 minutes to fall asleep, and is frequent awakenings during night (every 2 hours).  Patient notes that she is had worsening memory issues which may be related to her anxiety.  Pt states that she wakes up tired. She denies headaches after waking up. Pt states that Dr. Quiñones (PCP) added propranolol and atarax prn.  She denies any depressive episodes reporting it as a 1-2 on a scale 0-10 with 10 being the worst.  Denies any side effects to the medications.   Denies any SI/HI/AH/VH.         Objective   Vital Signs:   There were no vitals taken for this visit.  Due to the remote nature of this encounter (virtual encounter), vitals were unable to be obtained.  Height stated at 66 inches.  Weight stated at 184 pounds.      PHQ-9 Score:   PHQ-9 Total Score:       Patient screened positive for depression based on a PHQ-9 score of 1 on 6/21/2021. Follow-up recommendations include: see notes and medication list.        Mental Status Exam:   Hygiene:   good  Cooperation:  Cooperative  Eye Contact:  Good  Psychomotor Behavior:  Appropriate  Affect:  Appropriate  Mood: anxious  Speech:  Normal  Thought Process:  Goal directed and Linear  Thought Content:  Normal  Suicidal:  None  Homicidal:  None  Hallucinations:  None  Delusion:  None  Memory:  Intact  Orientation:  Person, Place, Time and Situation  Reliability:  good  Insight:  Good  Judgement:  Good and Fair  Impulse Control:  Good and Fair  Physical/Medical Issues:  Yes hypothyroidism and fibromylgia     Current Medications:   Current Outpatient Medications   Medication Sig Dispense Refill   • atomoxetine (Strattera) 60 MG capsule Take 1 capsule by mouth Daily. 90 capsule 0   • cloNIDine (Catapres) 0.2 MG tablet Take 1 tablet by mouth Every Night. 90 tablet 0   • diclofenac (VOLTAREN) 75 MG EC tablet Take 1 tablet by mouth 2 (Two) Times a Day. For chronic pain and inflammation 60 tablet 5   • Diclofenac Sodium (VOLTAREN) 1 % gel gel Apply 4 gram topically to the appropriate area as directed 4 (Four) Times a Day As Needed. 300 g 2   • DULoxetine (CYMBALTA) 60 MG capsule Take 1 capsule by mouth Daily for mood and fibromyalgia 90 capsule 0   • hydrOXYzine (ATARAX) 25 MG tablet Take 1 tablet by mouth 3 (Three) Times a Day As Needed for Anxiety. 90 tablet 1   • levothyroxine (SYNTHROID, LEVOTHROID) 150 MCG tablet Take 1 tablet by mouth every morning on an empty stomach 90 tablet 1   • metroNIDAZOLE (METROGEL) 0.75 % gel Apply to  upper cheeks and nose where redness occurs. 45 g 1   • pregabalin (LYRICA) 150 MG capsule Take 1 capsule by mouth three times a day 90 capsule 1   • propranolol (INDERAL) 10 MG tablet Take 1 tablet by mouth 3 (Three) Times a Day As Needed (for anxiety or racing heart beats.). 90 tablet 1   • silver sulfadiazine (Silvadene) 1 % cream Apply  topically to the appropriate area as directed 2 (Two) Times a Day. For burn treatment 400 g 2   • traZODone (DESYREL) 50 MG tablet Take 0.5-1 tablets by mouth At Night As Needed for Sleep. 30 tablet 0   • vitamin D (ERGOCALCIFEROL) 1.25 MG (36676 UT) capsule capsule Take one capsule by mouth twice weekly with food 8 capsule 5     No current facility-administered medications for this visit.       Physical Exam  Nursing note reviewed.   Constitutional:       Appearance: Normal appearance.   Neurological:      Mental Status: She is alert.   Psychiatric:         Attention and Perception: Attention and perception normal. She is attentive.         Mood and Affect: Affect normal. Mood is anxious.         Speech: Speech normal.         Behavior: Behavior is cooperative.         Thought Content: Thought content normal.         Cognition and Memory: Cognition normal. She does not exhibit impaired recent memory.         Judgment: Judgment normal.        Result Review :     The following data was reviewed by: KATY Gallagher on 04/01/2021:  Common labs    Common Labsle 1/15/21 2/17/21 2/17/21 4/5/21 4/5/21     1204 1204 1028 1028   Glucose 63 (A)  97  80   BUN 23 (A)  19  14   Creatinine 0.63  0.70  0.92   eGFR Non African Am 102  90  66   Sodium 140  139  136   Potassium 3.9  3.8  3.7   Chloride 106  102  99   Calcium 9.1  9.5  8.5 (A)   Albumin 4.00  4.40  4.10   Total Bilirubin 0.2  0.4  0.3   Alkaline Phosphatase 71  79  63   AST (SGOT) 20  16  18   ALT (SGPT) 14  13  14   WBC  8.41      Hemoglobin  14.1      Hematocrit  41.5      Platelets  208      Total Cholesterol    281 (A)     Triglycerides    259 (A)    HDL Cholesterol    52    LDL Cholesterol     180 (A)    (A) Abnormal value            CMP    CMP 1/15/21 2/17/21 4/5/21   Glucose 63 (A) 97 80   BUN 23 (A) 19 14   Creatinine 0.63 0.70 0.92   eGFR Non African Am 102 90 66   Sodium 140 139 136   Potassium 3.9 3.8 3.7   Chloride 106 102 99   Calcium 9.1 9.5 8.5 (A)   Albumin 4.00 4.40 4.10   Total Bilirubin 0.2 0.4 0.3   Alkaline Phosphatase 71 79 63   AST (SGOT) 20 16 18   ALT (SGPT) 14 13 14   (A) Abnormal value            CBC    CBC 8/26/20 2/17/21   WBC 9.6 8.41   RBC 4.82 4.95   Hemoglobin 14.2 14.1   Hematocrit 43.4 41.5   MCV 90.0 83.8   MCH 29.5 28.5   MCHC 32.7 (A) 34.0   RDW 13.0 14.5   Platelets 243 208   (A) Abnormal value            CBC w/diff    CBC w/Diff 6/29/20 8/26/20 2/17/21   WBC 8.88 9.6 8.41   RBC 5.22 4.82 4.95   Hemoglobin 14.9 14.2 14.1   Hematocrit 45.7 43.4 41.5   MCV 87.5 90.0 83.8   MCH 28.5 29.5 28.5   MCHC 32.6 32.7 (A) 34.0   RDW 12.9 13.0 14.5   Platelets 255 243 208   Neutrophil Rel % 44.6 52.7 46.8   Immature Granulocyte Rel % 0.2  0.1   Lymphocyte Rel % 47.1 (A) 36.3 42.1   Monocyte Rel % 6.1 9.3 9.0   Eosinophil Rel % 1.5 1 1.3   Basophil Rel % 0.5 0.5 0.7   (A) Abnormal value            Lipid Panel    Lipid Panel 8/26/20 4/5/21   Total Cholesterol  281 (A)   Total Cholesterol 211 (A)    Triglycerides 289 (A) 259 (A)   HDL Cholesterol 36 (A) 52   VLDL Cholesterol  49 (A)   LDL Cholesterol  117 180 (A)   LDL/HDL Ratio  3.41   (A) Abnormal value       Comments are available for some flowsheets but are not being displayed.           TSH    TSH 4/5/21 5/17/21 7/15/21   TSH 83.100 (A) 46.500 (A) 0.568   (A) Abnormal value            HgB    HGB 8/26/20 2/17/21   Hemoglobin 14.2 14.1           UA    Urinalysis 8/26/20 12/2/20 12/2/20 2/17/21     1141 1141    Squamous Epithelial Cells, UA   3-6 (A)    Specific Gravity, UA 1.020 1.016  1.012   Ketones, UA  Negative  Negative   Blood, UA TRACE-INTACT (A)  Negative  Negative   Leukocytes, UA Negative Trace (A)  Negative   Nitrite, UA Negative Negative  Negative   RBC, UA   0-2 (A)    WBC, UA   0-2 (A)    Bacteria, UA   4+ (A)    (A) Abnormal value            Data reviewed: PCP notes        Assessment and Plan    Problem List Items Addressed This Visit        Sleep    Sleep difficulties    Relevant Medications    traZODone (DESYREL) 50 MG tablet      Other Visit Diagnoses     Moderate episode of recurrent major depressive disorder (CMS/HCC)  (Chronic)       Relevant Medications    traZODone (DESYREL) 50 MG tablet    Generalized anxiety disorder  (Chronic)       Relevant Medications    traZODone (DESYREL) 50 MG tablet    History of attention deficit hyperactivity disorder (ADHD)                TREATMENT PLAN/GOALS: Continue supportive psychotherapy efforts and medications as indicated. Treatment and medication options discussed during today's visit. Patient ackowledged and verbally consented to continue with current treatment plan and was educated on the importance of compliance with treatment and follow-up appointments.    MEDICATION ISSUES:  We discussed risks, benefits, and side effects of the above medications and the patient was agreeable with the plan. Patient was educated on the importance of compliance with treatment and follow-up appointments.  Patient is agreeable to call the office with any worsening of symptoms or onset of side effects. Patient is agreeable to call 911 or go to the nearest ER should he/she begin having SI/HI.     -Continue Strattera  60 mg daily for ADHD symptoms.  -Continue clonidine 0.2 mg at night instead of 0.1 mg for sleep as well as focus and attention.  -Continue Cymbalta 60 mg daily for depression and anxiety.  -PCP has been monitoring and taking over propanolol as prevention of heart palpitations as well as hydroxyzine as needed for anxiety.  -Begin low-dose of trazodone 25 to 50 mg at night as needed for sleep.       Counseled  patient regarding multimodal approach with healthy nutrition, healthy sleep, regular physical activity, social activities, counseling, and medications.      Coping skills reviewed and encouraged positive framing of thoughts     Assisted patient in processing above session content; acknowledged and normalized patient’s thoughts, feelings, and concerns.  Applied  positive coping skills and behavior management in session.  Allowed patient to freely discuss issues without interruption or judgment. Provided safe, confidential environment to facilitate the development of positive therapeutic relationship and encourage open, honest communication. Assisted patient in identifying risk factors which would indicate the need for higher level of care including thoughts to harm self or others and/or self-harming behavior and encouraged patient to contact this office, call 911, or present to the nearest emergency room should any of these events occur. Discussed crisis intervention services and means to access.     MEDS ORDERED DURING VISIT:  New Medications Ordered This Visit   Medications   • traZODone (DESYREL) 50 MG tablet     Sig: Take 0.5-1 tablets by mouth At Night As Needed for Sleep.     Dispense:  30 tablet     Refill:  0           Follow Up   Return in about 4 weeks (around 8/16/2021), or if symptoms worsen or fail to improve, for Recheck.    Patient was given instructions and counseling regarding her condition or for health maintenance advice. Please see specific information pulled into the AVS if appropriate.     Some of the data in this electronic note has been brought forward from a previous encounter, any necessary changes have been made, it has been reviewed by this APRN, and it is accurate.      This document has been electronically signed by KATY Gallagher  July 19, 2021 09:39 EDT    Part of this note may be an electronic transcription/translation of spoken language to printed text using the Dragon  Dictation System.

## 2021-07-19 NOTE — TREATMENT PLAN
Multi-Disciplinary Problems (from Behavioral Health Treatment Plan)    Active Problems     Problem: Anxiety  Start Date: 07/19/21    Problem Details: The patient self-scales this problem as a 7 with 10 being the worst.        Goal Priority Start Date Expected End Date End Date    Patient will develop and implement behavioral and cognitive strategies to reduce anxiety and irrational fears. -- 07/19/21 -- --    Goal Details: Progress toward goal:  The patient self-scales their progress related to this goal as a 5 with 10 being the worst.        Goal Intervention Frequency Start Date End Date    Help patient explore past emotional issues in relation to present anxiety. Q Month 07/19/21 --    Intervention Details: Duration of treatment until until remission of symptoms.        Goal Intervention Frequency Start Date End Date    Help patient develop an awareness of their cognitive and physical responses to anxiety. Q Month 07/19/21 --    Intervention Details: Duration of treatment until until remission of symptoms.              Problem: Depression  Start Date: 07/19/21    Problem Details: The patient self-scales this problem as a 1 with 10 being the worst.        Goal Priority Start Date Expected End Date End Date    Patient will demonstrate the ability to initiate new constructive life skills outside of sessions on a consistent basis. -- 07/19/21 -- --    Goal Details: Progress toward goal:  The patient self-scales their progress related to this goal as a 1 with 10 being the worst.        Goal Intervention Frequency Start Date End Date    Assist patient in setting attainable activities of daily living goals. PRN 07/19/21 --    Goal Intervention Frequency Start Date End Date    Provide education about depression Q Month 07/19/21 --    Intervention Details: Duration of treatment until until remission of symptoms.        Goal Intervention Frequency Start Date End Date    Assist patient in developing healthy coping  strategies. Q Month 07/19/21 --    Intervention Details: Duration of treatment until until remission of symptoms.                           I have discussed and reviewed this treatment plan with the patient..

## 2021-08-10 ENCOUNTER — TELEMEDICINE (OUTPATIENT)
Dept: PSYCHIATRY | Facility: CLINIC | Age: 46
End: 2021-08-10

## 2021-08-10 DIAGNOSIS — Z86.59 HISTORY OF ATTENTION DEFICIT HYPERACTIVITY DISORDER (ADHD): Primary | ICD-10-CM

## 2021-08-10 DIAGNOSIS — G47.9 SLEEP DIFFICULTIES: ICD-10-CM

## 2021-08-10 DIAGNOSIS — F33.0 MILD EPISODE OF RECURRENT MAJOR DEPRESSIVE DISORDER (HCC): ICD-10-CM

## 2021-08-10 DIAGNOSIS — F41.1 GENERALIZED ANXIETY DISORDER: ICD-10-CM

## 2021-08-10 PROCEDURE — 99214 OFFICE O/P EST MOD 30 MIN: CPT | Performed by: NURSE PRACTITIONER

## 2021-08-10 RX ORDER — DULOXETIN HYDROCHLORIDE 60 MG/1
60 CAPSULE, DELAYED RELEASE ORAL DAILY
Qty: 90 CAPSULE | Refills: 0 | Status: SHIPPED | OUTPATIENT
Start: 2021-08-10 | End: 2021-11-18 | Stop reason: SDUPTHER

## 2021-08-10 RX ORDER — ATOMOXETINE 40 MG/1
40 CAPSULE ORAL 2 TIMES DAILY
Qty: 60 CAPSULE | Refills: 0 | Status: SHIPPED | OUTPATIENT
Start: 2021-08-10 | End: 2021-10-01

## 2021-08-10 RX ORDER — CLONIDINE HYDROCHLORIDE 0.2 MG/1
0.2 TABLET ORAL NIGHTLY
Qty: 90 TABLET | Refills: 0 | Status: SHIPPED | OUTPATIENT
Start: 2021-08-10 | End: 2021-11-18 | Stop reason: SDUPTHER

## 2021-08-10 RX ORDER — TRAZODONE HYDROCHLORIDE 50 MG/1
25-50 TABLET ORAL NIGHTLY PRN
Qty: 90 TABLET | Refills: 0 | Status: SHIPPED | OUTPATIENT
Start: 2021-08-10 | End: 2021-11-18 | Stop reason: SDUPTHER

## 2021-08-10 NOTE — PROGRESS NOTES
This provider is located at [facility], [address] [place] [address].The Patient is seen remotely at home, [address] [place] [address] via [employer]. Patient is being seen via telehealth and confirm that they are in a secure environment for this session. The patient's condition being diagnosed/treated is appropriate for telemedicine. The provider identified himself/herself: herself as well as her credentials.   The patient gave consent to be seen remotely, and when consent is given they understand that the consent allows for patient identifiable information to be sent to a third party as needed.   They may refuse to be seen remotely at any time. The electronic data is encrypted and password protected, and the patient has been advised of the potential risks to privacy not withstanding such measures.    You have chosen to receive care through a telehealth visit.  Do you consent to use a video/audio connection for your medical care today? Yes    Chief Complaint  Focus and mood     Subjective    Kimberly Morgan presents to [facility] for medication management.     History of Present Illness   Patient presents today reporting that she has been tired and her anxiety has been up but feels it is been situational as she is getting her daughter back in school.  Patient reports that she is sleeping good with the trazodone.  She denies any depressive symptoms.  Patient reports that her anxiety is mostly situational.  Patient states however she has been forgetting things more often.  She states that she is not able to finish what she is started and then has to go back to it and is forgetful of where she was at.  Patient states that she is forgetting conversations at home with her family.  Patient states that it started last month but has been getting worse as she even will get out of her normal routine.  Patient also notes that she has been stressed  eating but she is seeing a dietitian.  Patient denies any side effects to the medications.  Denies any SI/HI/AH/VH.        Objective   Vital Signs:   There were no vitals taken for this visit.  Due to the remote nature of this encounter (virtual encounter), vitals were unable to be obtained.  Height stated at 66 inches.  Weight stated at 184 pounds.      PHQ-9 Score:   PHQ-9 Total Score:       Patient screened positive for depression based on a PHQ-9 score of 1 on 6/21/2021. Follow-up recommendations include: see notes and medication list.        Mental Status Exam:   Hygiene:   good  Cooperation:  Cooperative  Eye Contact:  Good  Psychomotor Behavior:  Appropriate  Affect:  Appropriate  Mood: anxious  Speech:  Normal  Thought Process:  Goal directed and Linear  Thought Content:  Normal  Suicidal:  None  Homicidal:  None  Hallucinations:  None  Delusion:  None  Memory:  Intact  Orientation:  Person, Place, Time and Situation  Reliability:  good  Insight:  Good  Judgement:  Good and Fair  Impulse Control:  Good and Fair  Physical/Medical Issues:  Yes hypothyroidism and fibromylgia     Current Medications:   Current Outpatient Medications   Medication Sig Dispense Refill   • atomoxetine (Strattera) 40 MG capsule Take 1 capsule by mouth 2 (two) times a day. 60 capsule 0   • cloNIDine (Catapres) 0.2 MG tablet Take 1 tablet by mouth Every Night. 90 tablet 0   • diclofenac (VOLTAREN) 75 MG EC tablet Take 1 tablet by mouth 2 (Two) Times a Day. For chronic pain and inflammation 60 tablet 5   • Diclofenac Sodium (VOLTAREN) 1 % gel gel Apply 4 gram topically to the appropriate area as directed 4 (Four) Times a Day As Needed. 300 g 2   • DULoxetine (CYMBALTA) 60 MG capsule Take 1 capsule by mouth Daily for mood and fibromyalgia 90 capsule 0   • hydrOXYzine (ATARAX) 25 MG tablet Take 1 tablet by mouth 3 (Three) Times a Day As Needed for Anxiety. 90 tablet 1   • levothyroxine (SYNTHROID, LEVOTHROID) 150 MCG tablet Take 1 tablet  by mouth every morning on an empty stomach 90 tablet 1   • metroNIDAZOLE (METROGEL) 0.75 % gel Apply to upper cheeks and nose where redness occurs. 45 g 1   • pregabalin (LYRICA) 150 MG capsule Take 1 capsule by mouth three times a day 90 capsule 1   • propranolol (INDERAL) 10 MG tablet Take 1 tablet by mouth 3 (Three) Times a Day As Needed (for anxiety or racing heart beats.). 90 tablet 1   • silver sulfadiazine (Silvadene) 1 % cream Apply  topically to the appropriate area as directed 2 (Two) Times a Day. For burn treatment 400 g 2   • traZODone (DESYREL) 50 MG tablet Take 0.5-1 tablets by mouth At Night As Needed for Sleep. 90 tablet 0   • vitamin D (ERGOCALCIFEROL) 1.25 MG (36989 UT) capsule capsule Take one capsule by mouth twice weekly with food 8 capsule 5     No current facility-administered medications for this visit.       Physical Exam  Nursing note reviewed.   Constitutional:       Appearance: Normal appearance.   Neurological:      Mental Status: She is alert.   Psychiatric:         Attention and Perception: Attention and perception normal. She is attentive.         Mood and Affect: Affect normal. Mood is anxious.         Speech: Speech normal.         Behavior: Behavior is cooperative.         Thought Content: Thought content normal.         Cognition and Memory: Cognition normal. She does not exhibit impaired recent memory.         Judgment: Judgment normal.        Result Review :     The following data was reviewed by: KATY Gallagher on 04/01/2021:  Common labs    Common Labsle 1/15/21 2/17/21 2/17/21 4/5/21 4/5/21     1204 1204 1028 1028   Glucose 63 (A)  97  80   BUN 23 (A)  19  14   Creatinine 0.63  0.70  0.92   eGFR Non African Am 102  90  66   Sodium 140  139  136   Potassium 3.9  3.8  3.7   Chloride 106  102  99   Calcium 9.1  9.5  8.5 (A)   Albumin 4.00  4.40  4.10   Total Bilirubin 0.2  0.4  0.3   Alkaline Phosphatase 71  79  63   AST (SGOT) 20  16  18   ALT (SGPT) 14  13  14   WBC   8.41      Hemoglobin  14.1      Hematocrit  41.5      Platelets  208      Total Cholesterol    281 (A)    Triglycerides    259 (A)    HDL Cholesterol    52    LDL Cholesterol     180 (A)    (A) Abnormal value            CMP    CMP 1/15/21 2/17/21 4/5/21   Glucose 63 (A) 97 80   BUN 23 (A) 19 14   Creatinine 0.63 0.70 0.92   eGFR Non African Am 102 90 66   Sodium 140 139 136   Potassium 3.9 3.8 3.7   Chloride 106 102 99   Calcium 9.1 9.5 8.5 (A)   Albumin 4.00 4.40 4.10   Total Bilirubin 0.2 0.4 0.3   Alkaline Phosphatase 71 79 63   AST (SGOT) 20 16 18   ALT (SGPT) 14 13 14   (A) Abnormal value            CBC    CBC 8/26/20 2/17/21   WBC 9.6 8.41   RBC 4.82 4.95   Hemoglobin 14.2 14.1   Hematocrit 43.4 41.5   MCV 90.0 83.8   MCH 29.5 28.5   MCHC 32.7 (A) 34.0   RDW 13.0 14.5   Platelets 243 208   (A) Abnormal value            CBC w/diff    CBC w/Diff 6/29/20 8/26/20 2/17/21   WBC 8.88 9.6 8.41   RBC 5.22 4.82 4.95   Hemoglobin 14.9 14.2 14.1   Hematocrit 45.7 43.4 41.5   MCV 87.5 90.0 83.8   MCH 28.5 29.5 28.5   MCHC 32.6 32.7 (A) 34.0   RDW 12.9 13.0 14.5   Platelets 255 243 208   Neutrophil Rel % 44.6 52.7 46.8   Immature Granulocyte Rel % 0.2  0.1   Lymphocyte Rel % 47.1 (A) 36.3 42.1   Monocyte Rel % 6.1 9.3 9.0   Eosinophil Rel % 1.5 1 1.3   Basophil Rel % 0.5 0.5 0.7   (A) Abnormal value            Lipid Panel    Lipid Panel 8/26/20 4/5/21   Total Cholesterol  281 (A)   Total Cholesterol 211 (A)    Triglycerides 289 (A) 259 (A)   HDL Cholesterol 36 (A) 52   VLDL Cholesterol  49 (A)   LDL Cholesterol  117 180 (A)   LDL/HDL Ratio  3.41   (A) Abnormal value       Comments are available for some flowsheets but are not being displayed.           TSH    TSH 4/5/21 5/17/21 7/15/21   TSH 83.100 (A) 46.500 (A) 0.568   (A) Abnormal value            HgB    HGB 8/26/20 2/17/21   Hemoglobin 14.2 14.1           UA    Urinalysis 8/26/20 12/2/20 12/2/20 2/17/21     1141 1141    Squamous Epithelial Cells, UA   3-6 (A)     Specific Gravity, UA 1.020 1.016  1.012   Ketones, UA  Negative  Negative   Blood, UA TRACE-INTACT (A) Negative  Negative   Leukocytes, UA Negative Trace (A)  Negative   Nitrite, UA Negative Negative  Negative   RBC, UA   0-2 (A)    WBC, UA   0-2 (A)    Bacteria, UA   4+ (A)    (A) Abnormal value            Data reviewed: PCP notes        Assessment and Plan    Problem List Items Addressed This Visit        Sleep    Sleep difficulties    Relevant Medications    traZODone (DESYREL) 50 MG tablet    cloNIDine (Catapres) 0.2 MG tablet      Other Visit Diagnoses     History of attention deficit hyperactivity disorder (ADHD)    -  Primary    Relevant Medications    cloNIDine (Catapres) 0.2 MG tablet    atomoxetine (Strattera) 40 MG capsule    Generalized anxiety disorder        Relevant Medications    traZODone (DESYREL) 50 MG tablet    DULoxetine (CYMBALTA) 60 MG capsule    atomoxetine (Strattera) 40 MG capsule    Mild episode of recurrent major depressive disorder (CMS/HCC)        Relevant Medications    traZODone (DESYREL) 50 MG tablet    DULoxetine (CYMBALTA) 60 MG capsule    atomoxetine (Strattera) 40 MG capsule            TREATMENT PLAN/GOALS: Continue supportive psychotherapy efforts and medications as indicated. Treatment and medication options discussed during today's visit. Patient ackowledged and verbally consented to continue with current treatment plan and was educated on the importance of compliance with treatment and follow-up appointments.    MEDICATION ISSUES:  We discussed risks, benefits, and side effects of the above medications and the patient was agreeable with the plan. Patient was educated on the importance of compliance with treatment and follow-up appointments.  Patient is agreeable to call the office with any worsening of symptoms or onset of side effects. Patient is agreeable to call 911 or go to the nearest ER should he/she begin having SI/HI.     -Change Strattera to 40 mg twice daily for  focus and attention.  Encourage patient if not helpful at the next visit we would look at doing alternatives such as Adderall patient verbalized understanding.  -Continue clonidine 0.2 mg at night for focus and attention.   -Continue Cymbalta 60 mg daily for depression and anxiety.  -Continue 25-50mg of Trazodone at night prn for sleep.        Counseled patient regarding multimodal approach with healthy nutrition, healthy sleep, regular physical activity, social activities, counseling, and medications.      Coping skills reviewed and encouraged positive framing of thoughts     Assisted patient in processing above session content; acknowledged and normalized patient’s thoughts, feelings, and concerns.  Applied  positive coping skills and behavior management in session.  Allowed patient to freely discuss issues without interruption or judgment. Provided safe, confidential environment to facilitate the development of positive therapeutic relationship and encourage open, honest communication. Assisted patient in identifying risk factors which would indicate the need for higher level of care including thoughts to harm self or others and/or self-harming behavior and encouraged patient to contact this office, call 911, or present to the nearest emergency room should any of these events occur. Discussed crisis intervention services and means to access.     MEDS ORDERED DURING VISIT:  New Medications Ordered This Visit   Medications   • traZODone (DESYREL) 50 MG tablet     Sig: Take 0.5-1 tablets by mouth At Night As Needed for Sleep.     Dispense:  90 tablet     Refill:  0   • DULoxetine (CYMBALTA) 60 MG capsule     Sig: Take 1 capsule by mouth Daily for mood and fibromyalgia     Dispense:  90 capsule     Refill:  0   • cloNIDine (Catapres) 0.2 MG tablet     Sig: Take 1 tablet by mouth Every Night.     Dispense:  90 tablet     Refill:  0   • atomoxetine (Strattera) 40 MG capsule     Sig: Take 1 capsule by mouth 2 (two)  times a day.     Dispense:  60 capsule     Refill:  0           Follow Up   Return in about 4 weeks (around 9/7/2021), or if symptoms worsen or fail to improve, for Recheck.    Patient was given instructions and counseling regarding her condition or for health maintenance advice. Please see specific information pulled into the AVS if appropriate.     Some of the data in this electronic note has been brought forward from a previous encounter, any necessary changes have been made, it has been reviewed by this APRN, and it is accurate.      This document has been electronically signed by KATY Gallagher  August 10, 2021 09:16 EDT    Part of this note may be an electronic transcription/translation of spoken language to printed text using the Dragon Dictation System.

## 2021-08-20 ENCOUNTER — APPOINTMENT (OUTPATIENT)
Dept: NUTRITION | Facility: HOSPITAL | Age: 46
End: 2021-08-20

## 2021-08-24 DIAGNOSIS — R11.0 NAUSEA: Primary | ICD-10-CM

## 2021-08-24 RX ORDER — ONDANSETRON 4 MG/1
4 TABLET, FILM COATED ORAL 3 TIMES DAILY
Qty: 30 TABLET | Refills: 1 | Status: SHIPPED | OUTPATIENT
Start: 2021-08-24

## 2021-08-26 PROCEDURE — 87635 SARS-COV-2 COVID-19 AMP PRB: CPT | Performed by: NURSE PRACTITIONER

## 2021-08-31 DIAGNOSIS — K21.9 GASTROESOPHAGEAL REFLUX DISEASE WITHOUT ESOPHAGITIS: ICD-10-CM

## 2021-08-31 RX ORDER — OMEPRAZOLE 20 MG/1
20 CAPSULE, DELAYED RELEASE ORAL DAILY
Qty: 30 CAPSULE | Refills: 5 | Status: CANCELLED | OUTPATIENT
Start: 2021-08-31

## 2021-09-07 ENCOUNTER — TELEMEDICINE (OUTPATIENT)
Dept: PSYCHIATRY | Facility: CLINIC | Age: 46
End: 2021-09-07

## 2021-09-07 ENCOUNTER — TELEPHONE (OUTPATIENT)
Dept: PSYCHIATRY | Facility: CLINIC | Age: 46
End: 2021-09-07

## 2021-09-07 DIAGNOSIS — F33.0 MILD EPISODE OF RECURRENT MAJOR DEPRESSIVE DISORDER (HCC): Primary | ICD-10-CM

## 2021-09-07 DIAGNOSIS — F41.1 GENERALIZED ANXIETY DISORDER: ICD-10-CM

## 2021-09-07 DIAGNOSIS — G47.9 SLEEP DIFFICULTIES: ICD-10-CM

## 2021-09-07 DIAGNOSIS — Z86.59 HISTORY OF ATTENTION DEFICIT HYPERACTIVITY DISORDER (ADHD): ICD-10-CM

## 2021-09-07 PROCEDURE — 99214 OFFICE O/P EST MOD 30 MIN: CPT | Performed by: NURSE PRACTITIONER

## 2021-09-07 NOTE — PROGRESS NOTES
This provider is located at [facility], [address] [place] [address].The Patient is seen remotely at home, [address] [place] [address] via [employer]. Patient is being seen via telehealth and confirm that they are in a secure environment for this session. The patient's condition being diagnosed/treated is appropriate for telemedicine. The provider identified himself/herself: herself as well as her credentials.   The patient gave consent to be seen remotely, and when consent is given they understand that the consent allows for patient identifiable information to be sent to a third party as needed.   They may refuse to be seen remotely at any time. The electronic data is encrypted and password protected, and the patient has been advised of the potential risks to privacy not withstanding such measures.    You have chosen to receive care through a telehealth visit.  Do you consent to use a video/audio connection for your medical care today? Yes    Chief Complaint  Focus and attention    Subjective    Kimberly Morgan presents to [facility] for medication management.     History of Present Illness   Patient states that she has had a difficult past couple of weeks.  Patient states that she had a friend die and another death in the family due to COVID so has been very difficult.  Patient states that her supervisor wrote her up at work at her 6-month timeframe in order to transfer and now she cannot transfer for another 6 months.  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  Patient states that she is worried because she is doing forgetting things and the benefits that she does not want to lose her job.  Patient states that she has been easily overwhelmed.  She notes that hydroxyzine and  propanolol have helped with her anxiety and panic.  Patient states her depression is doing well despite situational things as she is tolerating the medications okay without any side effects.  Patient reports she is sleeping good.  Patient states that her fibromyalgia doctor placed her on a new muscle relaxant which she is hesitant to take due to oversedation and is only taken once.  Patient states that she is still having issues with her memory and doing things in a timely manner as her coworkers have stated that she is slow.  Patient states that she is forgetful especially in conversations with her daughter and at work.  She states that she has been making careless mistakes with 2 voids at work and has a hard time relaxing.  Patient does have difficulty formulating a thought.  Denies any SI/HI/AH/VH.        Objective   Vital Signs:   There were no vitals taken for this visit.  Due to the remote nature of this encounter (virtual encounter), vitals were unable to be obtained.  Height stated at 66 inches.  Weight stated at 184 pounds.      PHQ-9 Score:   PHQ-9 Total Score:       Patient screened positive for depression based on a PHQ-9 score of 1 on 6/21/2021. Follow-up recommendations include: see notes and medication list.        Mental Status Exam:   Hygiene:   good  Cooperation:  Cooperative  Eye Contact:  Good  Psychomotor Behavior:  Appropriate  Affect:  Appropriate  Mood: sad and anxious  Speech:  Normal  Thought Process:  Goal directed and Linear  Thought Content:  Normal  Suicidal:  None  Homicidal:  None  Hallucinations:  None  Delusion:  None  Memory:  Intact  Orientation:  Person, Place, Time and Situation  Reliability:  good  Insight:  Good  Judgement:  Good and Fair  Impulse Control:  Good and Fair  Physical/Medical Issues:  Yes hypothyroidism and fibromylgia     Current Medications:   Current Outpatient Medications   Medication Sig Dispense Refill   • atomoxetine (Strattera) 40 MG capsule Take 1 capsule  by mouth 2 (two) times a day. 60 capsule 0   • cloNIDine (Catapres) 0.2 MG tablet Take 1 tablet by mouth Every Night. 90 tablet 0   • diclofenac (VOLTAREN) 75 MG EC tablet Take 1 tablet by mouth 2 (Two) Times a Day. For chronic pain and inflammation 60 tablet 5   • Diclofenac Sodium (VOLTAREN) 1 % gel gel Apply 4 gram topically to the appropriate area as directed 4 (Four) Times a Day As Needed. 300 g 2   • DULoxetine (CYMBALTA) 60 MG capsule Take 1 capsule by mouth Daily for mood and fibromyalgia 90 capsule 0   • hydrOXYzine (ATARAX) 25 MG tablet Take 1 tablet by mouth 3 (Three) Times a Day As Needed for Anxiety. 90 tablet 1   • levothyroxine (SYNTHROID, LEVOTHROID) 150 MCG tablet Take 1 tablet by mouth every morning on an empty stomach 90 tablet 1   • metaxalone (SKELAXIN) 800 MG tablet Take 1 tablet by mouth 3 (Three) Times a Day. (Patient taking differently: Take 800 mg by mouth 3 (Three) Times a Day. Pt taking only prn) 90 tablet 1   • metroNIDAZOLE (METROGEL) 0.75 % gel Apply to upper cheeks and nose where redness occurs. 45 g 1   • ondansetron (Zofran) 4 MG tablet Take 1 tablet by mouth 3 (Three) Times a Day. 30 tablet 1   • pregabalin (LYRICA) 150 MG capsule Take 1 capsule by mouth twice daily and 2 capsules at bedtime. 90 capsule 1   • propranolol (INDERAL) 10 MG tablet Take 1 tablet by mouth 3 (Three) Times a Day As Needed (for anxiety or racing heart beats.). 90 tablet 1   • silver sulfadiazine (Silvadene) 1 % cream Apply  topically to the appropriate area as directed 2 (Two) Times a Day. For burn treatment 400 g 2   • traZODone (DESYREL) 50 MG tablet Take 0.5-1 tablet by mouth At Night As Needed for Sleep. 90 tablet 0   • vitamin D (ERGOCALCIFEROL) 1.25 MG (34598 UT) capsule capsule Take one capsule by mouth twice weekly with food 8 capsule 5   • lisdexamfetamine (Vyvanse) 40 MG capsule Take 1 capsule by mouth Every Morning 30 capsule 0     No current facility-administered medications for this visit.        Physical Exam  Nursing note reviewed.   Constitutional:       Appearance: Normal appearance.   Neurological:      Mental Status: She is alert.   Psychiatric:         Attention and Perception: Perception normal. She is inattentive.         Mood and Affect: Mood is anxious. Affect is tearful.         Speech: Speech normal.         Behavior: Behavior is cooperative.         Thought Content: Thought content normal.         Cognition and Memory: Cognition normal. She does not exhibit impaired recent memory.         Judgment: Judgment normal.        Result Review :     The following data was reviewed by: KATY Gallagher on 04/01/2021:  Common labs    Common Labsle 1/15/21 2/17/21 2/17/21 4/5/21 4/5/21     1204 1204 1028 1028   Glucose 63 (A)  97  80   BUN 23 (A)  19  14   Creatinine 0.63  0.70  0.92   eGFR Non African Am 102  90  66   Sodium 140  139  136   Potassium 3.9  3.8  3.7   Chloride 106  102  99   Calcium 9.1  9.5  8.5 (A)   Albumin 4.00  4.40  4.10   Total Bilirubin 0.2  0.4  0.3   Alkaline Phosphatase 71  79  63   AST (SGOT) 20  16  18   ALT (SGPT) 14  13  14   WBC  8.41      Hemoglobin  14.1      Hematocrit  41.5      Platelets  208      Total Cholesterol    281 (A)    Triglycerides    259 (A)    HDL Cholesterol    52    LDL Cholesterol     180 (A)    (A) Abnormal value            CMP    CMP 1/15/21 2/17/21 4/5/21   Glucose 63 (A) 97 80   BUN 23 (A) 19 14   Creatinine 0.63 0.70 0.92   eGFR Non African Am 102 90 66   Sodium 140 139 136   Potassium 3.9 3.8 3.7   Chloride 106 102 99   Calcium 9.1 9.5 8.5 (A)   Albumin 4.00 4.40 4.10   Total Bilirubin 0.2 0.4 0.3   Alkaline Phosphatase 71 79 63   AST (SGOT) 20 16 18   ALT (SGPT) 14 13 14   (A) Abnormal value            CBC    CBC 2/17/21   WBC 8.41   RBC 4.95   Hemoglobin 14.1   Hematocrit 41.5   MCV 83.8   MCH 28.5   MCHC 34.0   RDW 14.5   Platelets 208           CBC w/diff    CBC w/Diff 6/29/20 8/26/20 2/17/21   WBC 8.88 9.6 8.41   RBC 5.22 4.82  4.95   Hemoglobin 14.9 14.2 14.1   Hematocrit 45.7 43.4 41.5   MCV 87.5 90.0 83.8   MCH 28.5 29.5 28.5   MCHC 32.6 32.7 (A) 34.0   RDW 12.9 13.0 14.5   Platelets 255 243 208   Neutrophil Rel % 44.6 52.7 46.8   Immature Granulocyte Rel % 0.2  0.1   Lymphocyte Rel % 47.1 (A) 36.3 42.1   Monocyte Rel % 6.1 9.3 9.0   Eosinophil Rel % 1.5 1 1.3   Basophil Rel % 0.5 0.5 0.7   (A) Abnormal value            Lipid Panel    Lipid Panel 4/5/21   Total Cholesterol 281 (A)   Triglycerides 259 (A)   HDL Cholesterol 52   VLDL Cholesterol 49 (A)   LDL Cholesterol  180 (A)   LDL/HDL Ratio 3.41   (A) Abnormal value            TSH    TSH 4/5/21 5/17/21 7/15/21   TSH 83.100 (A) 46.500 (A) 0.568   (A) Abnormal value            HgB    HGB 2/17/21   Hemoglobin 14.1           UA    Urinalysis 12/2/20 12/2/20 2/17/21    1141 1141    Squamous Epithelial Cells, UA  3-6 (A)    [place], UA 1.016  1.012   Ketones, UA Negative  Negative   Blood, UA Negative  Negative   Leukocytes, UA Trace (A)  Negative   Nitrite, UA Negative  Negative   RBC, UA  0-2 (A)    WBC, UA  0-2 (A)    Bacteria, UA  4+ (A)    (A) Abnormal value            Data reviewed: PCP notes        Assessment and Plan    Problem List Items Addressed This Visit        Sleep    Sleep difficulties      Other Visit Diagnoses     Mild episode of recurrent major depressive disorder (CMS/HCC)    -  Primary    Relevant Medications    lisdexamfetamine (Vyvanse) 40 MG capsule    Generalized anxiety disorder        Relevant Medications    lisdexamfetamine (Vyvanse) 40 MG capsule    History of attention deficit hyperactivity disorder (ADHD)        Relevant Medications    lisdexamfetamine (Vyvanse) 40 MG capsule            TREATMENT PLAN/GOALS: Continue supportive psychotherapy efforts and medications as indicated. Treatment and medication options discussed during today's visit. Patient ackowledged and verbally consented to continue with current treatment plan and was educated on the  importance of compliance with treatment and follow-up appointments.    MEDICATION ISSUES:  We discussed risks, benefits, and side effects of the above medications and the patient was agreeable with the plan. Patient was educated on the importance of compliance with treatment and follow-up appointments.  Patient is agreeable to call the office with any worsening of symptoms or onset of side effects. Patient is agreeable to call 911 or go to the nearest ER should he/she begin having SI/HI.     -Begin Vyvanse 40 mg daily as patient has failed Adderall and Adderall XR as well as dextroamphetamine-amphetamine and extended release as well as methylphenidate.  -Continue clonidine 0.2 mg at night for focus and attention.   -Continue Cymbalta 60 mg daily for depression and anxiety.  -Continue 25-50mg of Trazodone at night prn for sleep.   -Informed patient once able to start Vyvanse take Strattera 40 mg for 7 to 10 days then discontinue patient verbalized understanding.     Counseled patient regarding multimodal approach with healthy nutrition, healthy sleep, regular physical activity, social activities, counseling, and medications.      Coping skills reviewed and encouraged positive framing of thoughts     Assisted patient in processing above session content; acknowledged and normalized patient’s thoughts, feelings, and concerns.  Applied  positive coping skills and behavior management in session.  Allowed patient to freely discuss issues without interruption or judgment. Provided safe, confidential environment to facilitate the development of positive therapeutic relationship and encourage open, honest communication. Assisted patient in identifying risk factors which would indicate the need for higher level of care including thoughts to harm self or others and/or self-harming behavior and encouraged patient to contact this office, call 911, or present to the nearest emergency room should any of these events occur.  Discussed crisis intervention services and means to access.     MEDS ORDERED DURING VISIT:  New Medications Ordered This Visit   Medications   • lisdexamfetamine (Vyvanse) 40 MG capsule     Sig: Take 1 capsule by mouth Every Morning     Dispense:  30 capsule     Refill:  0           Follow Up   Return in about 4 weeks (around 10/5/2021), or if symptoms worsen or fail to improve, for Recheck.    Patient was given instructions and counseling regarding her condition or for health maintenance advice. Please see specific information pulled into the AVS if appropriate.     Some of the data in this electronic note has been brought forward from a previous encounter, any necessary changes have been made, it has been reviewed by this APRN, and it is accurate.      This document has been electronically signed by KATY Gallagher  September 7, 2021 10:33 EDT    Part of this note may be an electronic transcription/translation of spoken language to printed text using the Dragon Dictation System.

## 2021-09-10 ENCOUNTER — TELEPHONE (OUTPATIENT)
Dept: PSYCHIATRY | Facility: CLINIC | Age: 46
End: 2021-09-10

## 2021-09-10 NOTE — TELEPHONE ENCOUNTER
Patient called, she has been taking Vyvanse for a few days and she said it feels like she is taking nothing at all, her mind is racing, she feels anxious. Wants to know how long it should take for her to feel relief from the medication? When it should take effect?    Thank You

## 2021-09-13 DIAGNOSIS — K21.9 GASTROESOPHAGEAL REFLUX DISEASE WITHOUT ESOPHAGITIS: ICD-10-CM

## 2021-09-13 RX ORDER — OMEPRAZOLE 20 MG/1
20 CAPSULE, DELAYED RELEASE ORAL DAILY
Qty: 30 CAPSULE | Refills: 1 | Status: SHIPPED | OUTPATIENT
Start: 2021-09-13 | End: 2022-01-25

## 2021-09-13 NOTE — TELEPHONE ENCOUNTER
Please have her go ahead and stop the Strattera the combination may be causing issues.  <<----- Click to add NO pertinent Past Medical History No pertinent past medical history

## 2021-09-20 ENCOUNTER — TELEPHONE (OUTPATIENT)
Dept: OBSTETRICS AND GYNECOLOGY | Facility: CLINIC | Age: 46
End: 2021-09-20

## 2021-09-21 DIAGNOSIS — Z12.31 ENCOUNTER FOR MAMMOGRAM TO ESTABLISH BASELINE MAMMOGRAM: Primary | ICD-10-CM

## 2021-09-30 ENCOUNTER — LAB (OUTPATIENT)
Dept: LAB | Facility: HOSPITAL | Age: 46
End: 2021-09-30

## 2021-09-30 ENCOUNTER — OFFICE VISIT (OUTPATIENT)
Dept: FAMILY MEDICINE CLINIC | Facility: CLINIC | Age: 46
End: 2021-09-30

## 2021-09-30 VITALS
HEIGHT: 65 IN | BODY MASS INDEX: 31.56 KG/M2 | SYSTOLIC BLOOD PRESSURE: 130 MMHG | WEIGHT: 189.4 LBS | HEART RATE: 60 BPM | DIASTOLIC BLOOD PRESSURE: 84 MMHG | OXYGEN SATURATION: 100 % | TEMPERATURE: 97.5 F

## 2021-09-30 DIAGNOSIS — E83.52 HYPERCALCEMIA: ICD-10-CM

## 2021-09-30 DIAGNOSIS — R52 BODY ACHES: ICD-10-CM

## 2021-09-30 DIAGNOSIS — N92.6 IRREGULAR MENSES: ICD-10-CM

## 2021-09-30 DIAGNOSIS — R53.82 CHRONIC FATIGUE: ICD-10-CM

## 2021-09-30 DIAGNOSIS — E89.0 HYPOTHYROIDISM, POSTABLATIVE: ICD-10-CM

## 2021-09-30 DIAGNOSIS — R41.89 BRAIN FOG: ICD-10-CM

## 2021-09-30 DIAGNOSIS — B35.1 TOENAIL FUNGUS: ICD-10-CM

## 2021-09-30 DIAGNOSIS — M79.7 FIBROMYALGIA: ICD-10-CM

## 2021-09-30 DIAGNOSIS — R94.6 ABNORMAL THYROID FUNCTION TEST: ICD-10-CM

## 2021-09-30 DIAGNOSIS — K59.09 CHRONIC CONSTIPATION: ICD-10-CM

## 2021-09-30 DIAGNOSIS — L65.9 HAIR LOSS: ICD-10-CM

## 2021-09-30 DIAGNOSIS — Z91.89 EXCESSIVE CONSUMPTION OF CARBONATED SOFT DRINKS: ICD-10-CM

## 2021-09-30 DIAGNOSIS — Z80.0 FAMILY HISTORY OF COLON CANCER: ICD-10-CM

## 2021-09-30 DIAGNOSIS — F41.9 CHRONIC ANXIETY: ICD-10-CM

## 2021-09-30 DIAGNOSIS — E03.9 ACQUIRED HYPOTHYROIDISM: ICD-10-CM

## 2021-09-30 DIAGNOSIS — E03.9 ACQUIRED HYPOTHYROIDISM: Primary | ICD-10-CM

## 2021-09-30 DIAGNOSIS — E67.8 EXCESSIVE CARBOHYDRATE INTAKE: ICD-10-CM

## 2021-09-30 LAB
ALBUMIN SERPL-MCNC: 4.3 G/DL (ref 3.5–5.2)
ALBUMIN/GLOB SERPL: 1.6 G/DL
ALP SERPL-CCNC: 64 U/L (ref 39–117)
ALT SERPL W P-5'-P-CCNC: 13 U/L (ref 1–33)
ANION GAP SERPL CALCULATED.3IONS-SCNC: 10 MMOL/L (ref 5–15)
AST SERPL-CCNC: 13 U/L (ref 1–32)
BACTERIA UR QL AUTO: ABNORMAL /HPF
BILIRUB SERPL-MCNC: 0.2 MG/DL (ref 0–1.2)
BILIRUB UR QL STRIP: NEGATIVE
BUN SERPL-MCNC: 20 MG/DL (ref 6–20)
BUN/CREAT SERPL: 25 (ref 7–25)
CALCIUM SPEC-SCNC: 9.1 MG/DL (ref 8.6–10.5)
CHLORIDE SERPL-SCNC: 101 MMOL/L (ref 98–107)
CLARITY UR: ABNORMAL
CO2 SERPL-SCNC: 27 MMOL/L (ref 22–29)
COD CRY URNS QL: ABNORMAL /HPF
COLOR UR: YELLOW
CREAT SERPL-MCNC: 0.8 MG/DL (ref 0.57–1)
DEPRECATED RDW RBC AUTO: 42.1 FL (ref 37–54)
ERYTHROCYTE [DISTWIDTH] IN BLOOD BY AUTOMATED COUNT: 12.5 % (ref 12.3–15.4)
ESTRADIOL SERPL HS-MCNC: 418 PG/ML
GFR SERPL CREATININE-BSD FRML MDRD: 77 ML/MIN/1.73
GLOBULIN UR ELPH-MCNC: 2.7 GM/DL
GLUCOSE SERPL-MCNC: 120 MG/DL (ref 65–99)
GLUCOSE UR STRIP-MCNC: NEGATIVE MG/DL
HCT VFR BLD AUTO: 42.8 % (ref 34–46.6)
HGB BLD-MCNC: 13.8 G/DL (ref 12–15.9)
HGB UR QL STRIP.AUTO: NEGATIVE
HYALINE CASTS UR QL AUTO: ABNORMAL /LPF
IRON 24H UR-MRATE: 83 MCG/DL (ref 37–145)
IRON SATN MFR SERPL: 20 % (ref 20–50)
KETONES UR QL STRIP: ABNORMAL
LEUKOCYTE ESTERASE UR QL STRIP.AUTO: ABNORMAL
MCH RBC QN AUTO: 29.9 PG (ref 26.6–33)
MCHC RBC AUTO-ENTMCNC: 32.2 G/DL (ref 31.5–35.7)
MCV RBC AUTO: 92.6 FL (ref 79–97)
NITRITE UR QL STRIP: NEGATIVE
PH UR STRIP.AUTO: 6 [PH] (ref 5–8)
PLATELET # BLD AUTO: 215 10*3/MM3 (ref 140–450)
PMV BLD AUTO: 11.8 FL (ref 6–12)
POTASSIUM SERPL-SCNC: 3.6 MMOL/L (ref 3.5–5.2)
PROT SERPL-MCNC: 7 G/DL (ref 6–8.5)
PROT UR QL STRIP: NEGATIVE
RBC # BLD AUTO: 4.62 10*6/MM3 (ref 3.77–5.28)
RBC # UR: ABNORMAL /HPF
REF LAB TEST METHOD: ABNORMAL
SODIUM SERPL-SCNC: 138 MMOL/L (ref 136–145)
SP GR UR STRIP: 1.02 (ref 1–1.03)
SQUAMOUS #/AREA URNS HPF: ABNORMAL /HPF
T4 FREE SERPL-MCNC: 2.05 NG/DL (ref 0.93–1.7)
TIBC SERPL-MCNC: 413 MCG/DL (ref 298–536)
TRANSFERRIN SERPL-MCNC: 277 MG/DL (ref 200–360)
TSH SERPL DL<=0.05 MIU/L-ACNC: 0.1 UIU/ML (ref 0.27–4.2)
UROBILINOGEN UR QL STRIP: ABNORMAL
VIT B12 BLD-MCNC: 403 PG/ML (ref 211–946)
WBC # BLD AUTO: 11.59 10*3/MM3 (ref 3.4–10.8)
WBC UR QL AUTO: ABNORMAL /HPF

## 2021-09-30 PROCEDURE — 84403 ASSAY OF TOTAL TESTOSTERONE: CPT

## 2021-09-30 PROCEDURE — 83525 ASSAY OF INSULIN: CPT

## 2021-09-30 PROCEDURE — 81001 URINALYSIS AUTO W/SCOPE: CPT

## 2021-09-30 PROCEDURE — 36415 COLL VENOUS BLD VENIPUNCTURE: CPT

## 2021-09-30 PROCEDURE — 84402 ASSAY OF FREE TESTOSTERONE: CPT

## 2021-09-30 PROCEDURE — 84466 ASSAY OF TRANSFERRIN: CPT

## 2021-09-30 PROCEDURE — 83527 ASSAY OF INSULIN: CPT

## 2021-09-30 PROCEDURE — 85027 COMPLETE CBC AUTOMATED: CPT

## 2021-09-30 PROCEDURE — 82607 VITAMIN B-12: CPT

## 2021-09-30 PROCEDURE — 99214 OFFICE O/P EST MOD 30 MIN: CPT | Performed by: FAMILY MEDICINE

## 2021-09-30 PROCEDURE — 82670 ASSAY OF TOTAL ESTRADIOL: CPT

## 2021-09-30 PROCEDURE — 83540 ASSAY OF IRON: CPT

## 2021-09-30 PROCEDURE — 84439 ASSAY OF FREE THYROXINE: CPT

## 2021-09-30 PROCEDURE — 84443 ASSAY THYROID STIM HORMONE: CPT

## 2021-09-30 PROCEDURE — 80053 COMPREHEN METABOLIC PANEL: CPT

## 2021-10-01 ENCOUNTER — APPOINTMENT (OUTPATIENT)
Dept: MAMMOGRAPHY | Facility: HOSPITAL | Age: 46
End: 2021-10-01

## 2021-10-01 DIAGNOSIS — M54.2 NECK PAIN, CHRONIC: ICD-10-CM

## 2021-10-01 DIAGNOSIS — G89.29 NECK PAIN, CHRONIC: ICD-10-CM

## 2021-10-01 DIAGNOSIS — M79.7 FIBROMYALGIA: ICD-10-CM

## 2021-10-01 RX ORDER — DICLOFENAC SODIUM 75 MG/1
75 TABLET, DELAYED RELEASE ORAL 2 TIMES DAILY
Qty: 60 TABLET | Refills: 2 | Status: SHIPPED | OUTPATIENT
Start: 2021-10-01 | End: 2022-02-15

## 2021-10-05 ENCOUNTER — DOCUMENTATION (OUTPATIENT)
Dept: FAMILY MEDICINE CLINIC | Facility: CLINIC | Age: 46
End: 2021-10-05

## 2021-10-05 ENCOUNTER — TELEMEDICINE (OUTPATIENT)
Dept: PSYCHIATRY | Facility: CLINIC | Age: 46
End: 2021-10-05

## 2021-10-05 DIAGNOSIS — F41.1 GENERALIZED ANXIETY DISORDER: ICD-10-CM

## 2021-10-05 DIAGNOSIS — Z86.59 HISTORY OF ATTENTION DEFICIT HYPERACTIVITY DISORDER (ADHD): ICD-10-CM

## 2021-10-05 DIAGNOSIS — F33.1 MODERATE EPISODE OF RECURRENT MAJOR DEPRESSIVE DISORDER (HCC): Primary | ICD-10-CM

## 2021-10-05 DIAGNOSIS — G47.9 SLEEP DIFFICULTIES: ICD-10-CM

## 2021-10-05 PROCEDURE — 99214 OFFICE O/P EST MOD 30 MIN: CPT | Performed by: NURSE PRACTITIONER

## 2021-10-05 RX ORDER — LEVOTHYROXINE SODIUM 0.15 MG/1
150 TABLET ORAL
Qty: 90 TABLET | Refills: 1 | Status: SHIPPED | OUTPATIENT
Start: 2021-10-05 | End: 2022-01-25

## 2021-10-05 NOTE — PROGRESS NOTES
This provider is located at [facility], [address] [place] [address].The Patient is seen remotely at home, [address] [place] [address] via [employer]. Patient is being seen via telehealth and confirm that they are in a secure environment for this session. The patient's condition being diagnosed/treated is appropriate for telemedicine. The provider identified himself/herself: herself as well as her credentials.   The patient gave consent to be seen remotely, and when consent is given they understand that the consent allows for patient identifiable information to be sent to a third party as needed.   They may refuse to be seen remotely at any time. The electronic data is encrypted and password protected, and the patient has been advised of the potential risks to privacy not withstanding such measures.    You have chosen to receive care through a telehealth visit.  Do you consent to use a video/audio connection for your medical care today? Yes.  Consent was verified via name date of birth and address as well as location.  You have chosen to receive care through a telephone visit. Do you consent to use a telephone visit for your medical care today? Yes  This visit has been rescheduled as a phone visit to comply with patient safety concerns in accordance with CDC recommendations. Total time of discussion was 15 minutes.  I spent a total of   25 minutes in direct patient care as well as reviewing labs and chart, greater than   15  minutes (greater than 50%) were spent in coordination of care, and counseling the patient regarding depression and anxiety as well as focus and attention. Answered any questions patient had with medication and plan.  Reviewed labs and discussed medications needed with PCP.        Chief Complaint  Focus and attention    Subjective    Kimberly Morgan presents to [facility] for medication management.     History of Present  Illness   Patient states that she stopped the Strattera and was in a fog and she was having significant memory issues.  Patient notes however this past weekend she thinks things have gone back to more normal sleep.  She notes that her depression and anxiety have gotten better over the weekend and work yesterday went well and she was able to focus and pay attention.  Patient feels now she is off Strattera and feels the Vyvanse is working she feels her depression and anxiety are becoming better.  Patient reports her sleep and appetite are the same as her pain still affects those things as well.  Patient states that she has 6 Vyvanse left encouraged her to contact the office Monday and will refill.  Patient states that she has not had her thyroid medication since Saturday and notes she sent message to her PCP in the office.  Encourage patient that her thyroid levels could be causing a lot of her symptoms and issues as her and her PCP are adjusting dose.  Patient denied any side effects to medications.  Denies any SI/HI/AH/VH.    Objective   Vital Signs:   There were no vitals taken for this visit.  Due to the remote nature of this encounter (virtual encounter), vitals were unable to be obtained.  Height stated at 66 inches.  Weight stated at 184 pounds.      PHQ-9 Score:   PHQ-9 Total Score:       Patient screened positive for depression based on a PHQ-9 score of 1 on 6/21/2021. Follow-up recommendations include: see notes and medication list.        Mental Status Exam:   Hygiene:   GEORGE due to telephone visit  Cooperation:  Cooperative  Eye Contact:  GEORGE due to telephone visit  Psychomotor Behavior:  GEORGE due to telephone visit  Affect:  Appropriate  Mood: normal, depressed and anxious  Speech:  Normal  Thought Process:  Goal directed and Linear  Thought Content:  Normal  Suicidal:  None  Homicidal:  None  Hallucinations:  None  Delusion:  None  Memory:  Intact  Orientation:  Person, Place, Time and  Situation  Reliability:  good  Insight:  Good  Judgement:  Good and Fair  Impulse Control:  Good and Fair  Physical/Medical Issues:  Yes hypothyroidism and fibromylgia     Current Medications:   Current Outpatient Medications   Medication Sig Dispense Refill   • cloNIDine (Catapres) 0.2 MG tablet Take 1 tablet by mouth Every Night. 90 tablet 0   • diclofenac (VOLTAREN) 75 MG EC tablet Take 1 tablet by mouth 2 (Two) Times a Day. For chronic pain and inflammation 60 tablet 2   • Diclofenac Sodium (VOLTAREN) 1 % gel gel Apply 4 gram topically to the appropriate area as directed 4 (Four) Times a Day As Needed. 300 g 2   • DULoxetine (CYMBALTA) 60 MG capsule Take 1 capsule by mouth Daily for mood and fibromyalgia 90 capsule 0   • hydrOXYzine (ATARAX) 25 MG tablet Take 1 tablet by mouth 3 (Three) Times a Day As Needed for Anxiety. 90 tablet 1   • levothyroxine (SYNTHROID, LEVOTHROID) 150 MCG tablet Take 1 tablet by mouth every morning on an empty stomach 90 tablet 1   • lisdexamfetamine (Vyvanse) 40 MG capsule Take 1 capsule by mouth Every Morning 30 capsule 0   • metaxalone (SKELAXIN) 800 MG tablet Take 1 tablet by mouth 3 (Three) Times a Day. (Patient taking differently: Take 800 mg by mouth 3 (Three) Times a Day. Pt taking only prn) 90 tablet 1   • metroNIDAZOLE (METROGEL) 0.75 % gel Apply to upper cheeks and nose where redness occurs. 45 g 1   • omeprazole (PrilOSEC) 20 MG capsule Take 1 capsule by mouth daily. Take 30 minutes before a meal for stomach acid control 30 capsule 1   • ondansetron (Zofran) 4 MG tablet Take 1 tablet by mouth 3 (Three) Times a Day. 30 tablet 1   • pregabalin (LYRICA) 150 MG capsule Take 1 capsule by mouth twice daily and 2 capsules at bedtime. 90 capsule 1   • propranolol (INDERAL) 10 MG tablet Take 1 tablet by mouth 3 (Three) Times a Day As Needed (for anxiety or racing heart beats.). 90 tablet 1   • silver sulfadiazine (Silvadene) 1 % cream Apply  topically to the appropriate area as  directed 2 (Two) Times a Day. For burn treatment 400 g 2   • traZODone (DESYREL) 50 MG tablet Take 0.5-1 tablet by mouth At Night As Needed for Sleep. 90 tablet 0   • vitamin D (ERGOCALCIFEROL) 1.25 MG (22836 UT) capsule capsule Take one capsule by mouth twice weekly with food 8 capsule 5     No current facility-administered medications for this visit.       Physical Exam  Nursing note reviewed.   Constitutional:       Appearance: Normal appearance.   Neurological:      Mental Status: She is alert.   Psychiatric:         Attention and Perception: Perception normal. She is inattentive.         Mood and Affect: Mood is anxious and depressed. Affect is not tearful.         Speech: Speech normal.         Behavior: Behavior is cooperative.         Thought Content: Thought content normal.         Cognition and Memory: Cognition normal. She does not exhibit impaired recent memory.         Judgment: Judgment normal.        Result Review :     The following data was reviewed by: KATY Gallagher on 04/01/2021:  Common labs    Common Labsle 2/17/21 2/17/21 4/5/21 4/5/21 9/30/21 9/30/21    1204 1204 1028 1028 1102 1102   Glucose  97  80 120 (A)    BUN  19  14 20    Creatinine  0.70  0.92 0.80    eGFR Non  Am  90  66 77    Sodium  139  136 138    Potassium  3.8  3.7 3.6    Chloride  102  99 101    Calcium  9.5  8.5 (A) 9.1    Albumin  4.40  4.10 4.30    Total Bilirubin  0.4  0.3 0.2    Alkaline Phosphatase  79  63 64    AST (SGOT)  16  18 13    ALT (SGPT)  13  14 13    WBC 8.41     11.59 (A)   Hemoglobin 14.1     13.8   Hematocrit 41.5     42.8   Platelets 208     215   Total Cholesterol   281 (A)      Triglycerides   259 (A)      HDL Cholesterol   52      LDL Cholesterol    180 (A)      (A) Abnormal value            CMP    CMP 2/17/21 4/5/21 9/30/21   Glucose 97 80 120 (A)   BUN 19 14 20   Creatinine 0.70 0.92 0.80   eGFR Non African Am 90 66 77   Sodium 139 136 138   Potassium 3.8 3.7 3.6   Chloride 102 99 101    Calcium 9.5 8.5 (A) 9.1   Albumin 4.40 4.10 4.30   Total Bilirubin 0.4 0.3 0.2   Alkaline Phosphatase 79 63 64   AST (SGOT) 16 18 13   ALT (SGPT) 13 14 13   (A) Abnormal value            CBC    CBC 2/17/21 9/30/21   WBC 8.41 11.59 (A)   RBC 4.95 4.62   Hemoglobin 14.1 13.8   Hematocrit 41.5 42.8   MCV 83.8 92.6   MCH 28.5 29.9   MCHC 34.0 32.2   RDW 14.5 12.5   Platelets 208 215   (A) Abnormal value            CBC w/diff    CBC w/Diff 6/29/20 8/26/20 2/17/21   WBC 8.88 9.6 8.41   RBC 5.22 4.82 4.95   Hemoglobin 14.9 14.2 14.1   Hematocrit 45.7 43.4 41.5   MCV 87.5 90.0 83.8   MCH 28.5 29.5 28.5   MCHC 32.6 32.7 (A) 34.0   RDW 12.9 13.0 14.5   Platelets 255 243 208   Neutrophil Rel % 44.6 52.7 46.8   Immature Granulocyte Rel % 0.2  0.1   Lymphocyte Rel % 47.1 (A) 36.3 42.1   Monocyte Rel % 6.1 9.3 9.0   Eosinophil Rel % 1.5 1 1.3   Basophil Rel % 0.5 0.5 0.7   (A) Abnormal value            Lipid Panel    Lipid Panel 4/5/21   Total Cholesterol 281 (A)   Triglycerides 259 (A)   HDL Cholesterol 52   VLDL Cholesterol 49 (A)   LDL Cholesterol  180 (A)   LDL/HDL Ratio 3.41   (A) Abnormal value            TSH    TSH 5/17/21 7/15/21 9/30/21   TSH 46.500 (A) 0.568 0.098 (A)   (A) Abnormal value            HgB    HGB 2/17/21 9/30/21   Hemoglobin 14.1 13.8           UA    Urinalysis 12/2/20 12/2/20 2/17/21 9/30/21 9/30/21    1141 1141  1102 1102   Squamous Epithelial Cells, UA  3-6 (A)   7-12 (A)   Specific [place], UA 1.016  1.012 1.023    Ketones, UA Negative  Negative Trace (A)    Blood, UA Negative  Negative Negative    Leukocytes, UA Trace (A)  Negative Trace (A)    Nitrite, UA Negative  Negative Negative    RBC, UA  0-2 (A)   None Seen   WBC, UA  0-2 (A)   0-2   Bacteria, UA  4+ (A)   4+ (A)   (A) Abnormal value            Data reviewed: PCP notes        Assessment and Plan    Problem List Items Addressed This Visit        Sleep    Sleep difficulties      Other Visit Diagnoses     Moderate episode of recurrent major  depressive disorder (HCC)    -  Primary    Generalized anxiety disorder        History of attention deficit hyperactivity disorder (ADHD)                TREATMENT PLAN/GOALS: Continue supportive psychotherapy efforts and medications as indicated. Treatment and medication options discussed during today's visit. Patient ackowledged and verbally consented to continue with current treatment plan and was educated on the importance of compliance with treatment and follow-up appointments.    MEDICATION ISSUES:  We discussed risks, benefits, and side effects of the above medications and the patient was agreeable with the plan. Patient was educated on the importance of compliance with treatment and follow-up appointments.  Patient is agreeable to call the office with any worsening of symptoms or onset of side effects. Patient is agreeable to call 911 or go to the nearest ER should he/she begin having SI/HI.     -Continue Vyvanse 40 mg daily as patient has failed Adderall and Adderall XR as well as dextroamphetamine-amphetamine and extended release as well as methylphenidate.  Patient feels now off Strattera she is tolerating the Vyvanse well without any side effects.  -Continue clonidine 0.2 mg at night for focus and attention.   -Continue Cymbalta 60 mg daily for depression and anxiety.  -Continue 25-50mg of Trazodone at night prn for sleep.   -Encourage patient that this may take some time where she is just now coming off the Strattera and having thyroid issues regarding her focus and attention as well as depression and anxiety symptoms.  -Contacted PCP as she stated she was adjusting her thyroid dose and would send in refills patient was made aware.     -Also explained to patient that she needs to ensure that her MyChart is working as well as her video and audio are enabled.  Since patient has had several issues connecting several different times encouraged her that if she continues to have any more issues than we would  have to refer her to someone in person as we cannot continue doing telephone visits as it was not appropriate patient verbalized understanding.    Counseled patient regarding multimodal approach with healthy nutrition, healthy sleep, regular physical activity, social activities, counseling, and medications.      Coping skills reviewed and encouraged positive framing of thoughts     Assisted patient in processing above session content; acknowledged and normalized patient’s thoughts, feelings, and concerns.  Applied  positive coping skills and behavior management in session.  Allowed patient to freely discuss issues without interruption or judgment. Provided safe, confidential environment to facilitate the development of positive therapeutic relationship and encourage open, honest communication. Assisted patient in identifying risk factors which would indicate the need for higher level of care including thoughts to harm self or others and/or self-harming behavior and encouraged patient to contact this office, call 911, or present to the nearest emergency room should any of these events occur. Discussed crisis intervention services and means to access.     MEDS ORDERED DURING VISIT:  No orders of the defined types were placed in this encounter.    No refills needed at this time.  Patient aware to contact the office Monday for refill of her Vyvanse request.      Follow Up   Return in about 4 weeks (around 11/2/2021), or if symptoms worsen or fail to improve, for Recheck.    Patient was given instructions and counseling regarding her condition or for health maintenance advice. Please see specific information pulled into the AVS if appropriate.     Some of the data in this electronic note has been brought forward from a previous encounter, any necessary changes have been made, it has been reviewed by this APRN, and it is accurate.      This document has been electronically signed by KATY Gallagher  October 5, 2021  11:01 EDT    Part of this note may be an electronic transcription/translation of spoken language to printed text using the Dragon Dictation System.

## 2021-10-06 LAB
TESTOST FREE SERPL-MCNC: 2 PG/ML (ref 0–4.2)
TESTOST SERPL-MCNC: 28 NG/DL (ref 4–50)

## 2021-10-07 LAB
INSULIN FREE SERPL-ACNC: 65 UU/ML
INSULIN SERPL-ACNC: 65 UU/ML

## 2021-10-11 DIAGNOSIS — Z86.59 HISTORY OF ATTENTION DEFICIT HYPERACTIVITY DISORDER (ADHD): ICD-10-CM

## 2021-10-11 DIAGNOSIS — F41.1 GENERALIZED ANXIETY DISORDER: ICD-10-CM

## 2021-10-11 RX ORDER — HYDROXYZINE HYDROCHLORIDE 25 MG/1
25 TABLET, FILM COATED ORAL 3 TIMES DAILY PRN
Qty: 90 TABLET | Refills: 1 | Status: CANCELLED | OUTPATIENT
Start: 2021-10-11

## 2021-10-12 DIAGNOSIS — F41.1 GENERALIZED ANXIETY DISORDER: ICD-10-CM

## 2021-10-12 RX ORDER — HYDROXYZINE HYDROCHLORIDE 25 MG/1
25 TABLET, FILM COATED ORAL 3 TIMES DAILY PRN
Qty: 90 TABLET | Refills: 1 | Status: SHIPPED | OUTPATIENT
Start: 2021-10-12 | End: 2021-11-18 | Stop reason: SDUPTHER

## 2021-10-14 ENCOUNTER — HOSPITAL ENCOUNTER (OUTPATIENT)
Dept: MAMMOGRAPHY | Facility: HOSPITAL | Age: 46
Discharge: HOME | End: 2021-10-14
Admitting: NURSE PRACTITIONER

## 2021-10-14 DIAGNOSIS — Z12.31 ENCOUNTER FOR MAMMOGRAM TO ESTABLISH BASELINE MAMMOGRAM: ICD-10-CM

## 2021-10-14 PROCEDURE — 77063 BREAST TOMOSYNTHESIS BI: CPT

## 2021-10-14 PROCEDURE — 77067 SCR MAMMO BI INCL CAD: CPT

## 2021-10-22 DIAGNOSIS — R00.2 PALPITATIONS: ICD-10-CM

## 2021-10-23 RX ORDER — PROPRANOLOL HYDROCHLORIDE 10 MG/1
10 TABLET ORAL 3 TIMES DAILY PRN
Qty: 90 TABLET | Refills: 1 | Status: SHIPPED | OUTPATIENT
Start: 2021-10-23 | End: 2022-01-25

## 2021-11-02 ENCOUNTER — CLINICAL SUPPORT (OUTPATIENT)
Dept: FAMILY MEDICINE CLINIC | Facility: CLINIC | Age: 46
End: 2021-11-02

## 2021-11-02 DIAGNOSIS — R53.83 FATIGUE, UNSPECIFIED TYPE: Primary | ICD-10-CM

## 2021-11-02 PROCEDURE — 96372 THER/PROPH/DIAG INJ SC/IM: CPT | Performed by: FAMILY MEDICINE

## 2021-11-02 RX ORDER — CYANOCOBALAMIN 1000 UG/ML
1000 INJECTION, SOLUTION INTRAMUSCULAR; SUBCUTANEOUS
Status: DISCONTINUED | OUTPATIENT
Start: 2021-11-02 | End: 2022-01-25

## 2021-11-02 RX ADMIN — CYANOCOBALAMIN 1000 MCG: 1000 INJECTION, SOLUTION INTRAMUSCULAR; SUBCUTANEOUS at 13:38

## 2021-11-11 ENCOUNTER — OFFICE VISIT (OUTPATIENT)
Dept: FAMILY MEDICINE CLINIC | Facility: CLINIC | Age: 46
End: 2021-11-11

## 2021-11-11 VITALS
WEIGHT: 186.2 LBS | OXYGEN SATURATION: 98 % | HEART RATE: 83 BPM | HEIGHT: 65 IN | DIASTOLIC BLOOD PRESSURE: 70 MMHG | BODY MASS INDEX: 31.02 KG/M2 | TEMPERATURE: 97.1 F | SYSTOLIC BLOOD PRESSURE: 116 MMHG

## 2021-11-11 DIAGNOSIS — B35.1 TOENAIL FUNGUS: ICD-10-CM

## 2021-11-11 DIAGNOSIS — E03.9 ACQUIRED HYPOTHYROIDISM: ICD-10-CM

## 2021-11-11 DIAGNOSIS — E16.1 HYPERINSULINEMIA: ICD-10-CM

## 2021-11-11 DIAGNOSIS — M54.2 NECK PAIN, CHRONIC: ICD-10-CM

## 2021-11-11 DIAGNOSIS — Z91.89 EXCESSIVE CONSUMPTION OF CARBONATED SOFT DRINKS: ICD-10-CM

## 2021-11-11 DIAGNOSIS — Z91.89 HAS POORLY BALANCED DIET: ICD-10-CM

## 2021-11-11 DIAGNOSIS — R53.83 FATIGUE, UNSPECIFIED TYPE: ICD-10-CM

## 2021-11-11 DIAGNOSIS — G89.29 NECK PAIN, CHRONIC: ICD-10-CM

## 2021-11-11 DIAGNOSIS — E55.9 VITAMIN D DEFICIENCY: ICD-10-CM

## 2021-11-11 DIAGNOSIS — F41.9 CHRONIC ANXIETY: ICD-10-CM

## 2021-11-11 DIAGNOSIS — R73.9 HYPERGLYCEMIA: ICD-10-CM

## 2021-11-11 DIAGNOSIS — R41.89 BRAIN FOG: ICD-10-CM

## 2021-11-11 DIAGNOSIS — K59.09 CHRONIC CONSTIPATION: ICD-10-CM

## 2021-11-11 DIAGNOSIS — F09 COGNITIVE DISORDER: ICD-10-CM

## 2021-11-11 DIAGNOSIS — M79.7 FIBROMYALGIA: Primary | ICD-10-CM

## 2021-11-11 DIAGNOSIS — L81.9 PIGMENTED SKIN LESION OF UNCERTAIN BEHAVIOR OF TORSO: ICD-10-CM

## 2021-11-11 PROCEDURE — 99214 OFFICE O/P EST MOD 30 MIN: CPT | Performed by: FAMILY MEDICINE

## 2021-11-11 RX ORDER — METAXALONE 800 MG/1
800 TABLET ORAL 3 TIMES DAILY PRN
Qty: 90 TABLET | Refills: 2 | Status: SHIPPED | OUTPATIENT
Start: 2021-11-11 | End: 2022-01-25

## 2021-11-11 NOTE — PATIENT INSTRUCTIONS
- See dermatology about the toenail fungus issues and the mole on the right side  - Continue current dose of the thyroid medication  - Do non fasting labs the first of December to recheck the thyroid, vitamin D and sugar issues.

## 2021-11-11 NOTE — PROGRESS NOTES
"Chief Complaint  Follow-up and Hypothyroidism, fibromyalgia, work performance and cognitive issues.    Subjective          Kimberly Morgan presents to [facility].    History of Present Illness    45 yo woman here to f/u on fibromyalgia and hypothyroidism.  BRII reviewed.    She is still struglling with intake of sugar and she has not noticed any change in how her brain function is whether she has a lot of sugar or not.    Mental focus is not good.  Still having brain fog.  Vyvanse is not helping.  She is not comprehending things like she wants to.  Has had learning disability in the past as a child.  Will see Mayda Jade in  again soon.  She is taking skelaxin tid and it's helping with the fibromyalgia and she is not hurting as bad on rainy days; it's the only muscle relaxer that didn't make her too sleepy to function.  The lyrica is a 150 mg cap po BID with 2 more caps at hs.  Sleep is okay and she thinks she is getting enough hours of sleep.    Diet is still erratic.  She will eat cheese sticks at lunch.  Will eat some kind of meat on weekends. She is trying to drink two 16 oz bottles of water a day.   Doesn't like Stevia, Aspartame, Splenda.    No HA's.  Had B 12 shot on Friday last week.      Review of Systems   Constitutional: Negative for fever.   HENT: Positive for congestion, postnasal drip, rhinorrhea and sinus pressure. Negative for ear pain, nosebleeds, sneezing and sore throat.    Respiratory: Positive for cough.    Cardiovascular: Negative for palpitations.   Gastrointestinal: Positive for constipation.   Endocrine: Negative for heat intolerance.   Skin:        Has \"mole\" on the R side in the area her bra wraps around and it irritates her.    Has some dystrophic toenails, ? fungus   Psychiatric/Behavioral: The patient is nervous/anxious.         Past Medical History:   Diagnosis Date   • ADD (attention deficit disorder)    • Anxiety    • " Depression    • Disease of thyroid gland    • Fibromyalgia    • Fibromyalgia, primary        Outpatient Medications Prior to Visit   Medication Sig Dispense Refill   • cloNIDine (Catapres) 0.2 MG tablet Take 1 tablet by mouth Every Night. 90 tablet 0   • diclofenac (VOLTAREN) 75 MG EC tablet Take 1 tablet by mouth 2 (Two) Times a Day. For chronic pain and inflammation 60 tablet 2   • Diclofenac Sodium (VOLTAREN) 1 % gel gel Apply 4 gram topically to the appropriate area as directed 4 (Four) Times a Day As Needed. 300 g 2   • DULoxetine (CYMBALTA) 60 MG capsule Take 1 capsule by mouth Daily for mood and fibromyalgia 90 capsule 0   • hydrOXYzine (ATARAX) 25 MG tablet Take 1 tablet by mouth 3 (Three) Times a Day As Needed for Anxiety. 90 tablet 1   • levothyroxine (SYNTHROID, LEVOTHROID) 150 MCG tablet Take 1 tablet by mouth Every Morning Before Breakfast. For thyroid 90 tablet 1   • lisdexamfetamine (Vyvanse) 40 MG capsule Take 1 capsule by mouth Every Morning 30 capsule 0   • metroNIDAZOLE (METROGEL) 0.75 % gel Apply to upper cheeks and nose where redness occurs. 45 g 1   • omeprazole (PrilOSEC) 20 MG capsule Take 1 capsule by mouth daily. Take 30 minutes before a meal for stomach acid control 30 capsule 1   • ondansetron (Zofran) 4 MG tablet Take 1 tablet by mouth 3 (Three) Times a Day. 30 tablet 1   • pregabalin (LYRICA) 150 MG capsule Take 1 capsule by mouth twice daily and 2 capsules at bedtime. 90 capsule 1   • pregabalin (LYRICA) 150 MG capsule Take 1 capsule by mouth 2 (Two) Times a Day AND 2 capsules Every Night. 90 capsule 1   • propranolol (INDERAL) 10 MG tablet Take 1 tablet by mouth 3 (Three) Times a Day As Needed (for anxiety or racing heart beats.). 90 tablet 1   • silver sulfadiazine (Silvadene) 1 % cream Apply  topically to the appropriate area as directed 2 (Two) Times a Day. For burn treatment 400 g 2   • traZODone (DESYREL) 50 MG tablet Take 0.5-1 tablet by mouth At Night As Needed for Sleep. 90  "tablet 0   • vitamin D (ERGOCALCIFEROL) 1.25 MG (15102 UT) capsule capsule Take one capsule by mouth twice weekly with food 8 capsule 5   • metaxalone (SKELAXIN) 800 MG tablet Take 1 tablet by mouth 3 (Three) Times a Day. (Patient taking differently: Take 800 mg by mouth 3 (Three) Times a Day. Pt taking only prn) 90 tablet 1     Facility-Administered Medications Prior to Visit   Medication Dose Route Frequency Provider Last Rate Last Admin   • cyanocobalamin injection 1,000 mcg  1,000 mcg Intramuscular Q28 Days Rosalind Quiñones MD   1,000 mcg at 11/02/21 1338          Objective   Vital Signs:   /70 (BP Location: Left arm, Patient Position: Sitting, Cuff Size: Adult)   Pulse 83   Temp 97.1 °F (36.2 °C) (Temporal)   Ht 165.1 cm (65\")   Wt 84.5 kg (186 lb 3.2 oz)   SpO2 98%   BMI 30.99 kg/m²       Physical Exam  Neck:      Trachea: Phonation normal.      Comments: Tight muscles, some restricted ROM  Cardiovascular:      Rate and Rhythm: Normal rate and regular rhythm.      Heart sounds: No murmur heard.      Pulmonary:      Effort: Pulmonary effort is normal.      Breath sounds: Normal breath sounds.   Chest:       Abdominal:      General: Bowel sounds are normal.      Palpations: Abdomen is soft.   Skin:     General: Skin is warm.   Neurological:      Mental Status: She is alert and oriented to person, place, and time.      Coordination: Coordination is intact.      Gait: Gait normal.      Comments: Not somnolent. Some [place] perhaps.   Psychiatric:         Mood and Affect: Mood normal.         Speech: Speech normal.         Behavior: Behavior normal. Behavior is not agitated, slowed, withdrawn or hyperactive. Behavior is cooperative.         Thought Content: Thought content normal.         Judgment: Judgment is impulsive.      Comments: Sometimes more unfocused than slow to comprehend.  Insight is fair, motivation is low, approach is passive.          Result Review :   The following data was reviewed by: " Rosalind Quiñones MD on 11/11/2021:        Component   Ref Range & Units 7 mo ago   (4/5/21)   [place] 1 yr ago   (8/26/20)   [place] 5 yr ago   (10/27/16)   Ch[place]aya/[place] 5 yr ago   (8/10/16)   [place] 6 yr ago   (3/12/15)   [facility]/[place]   25 Hydroxy, Vitamin D   ng/ml 76.1  95.8 R, CM  29.7 Low  R  35.3 R  33.1 R    Resulting Agency  [facility]            Comprehensive metabolic panel (09/30/2021 11:02) - , normal Ca ( had been slt low the time before)  Urinalysis With Culture If Indicated - Urine, Clean Catch (09/30/2021 11:02) - trace of ketones, trace of LE                  Assessment and Plan    Diagnoses and all orders for this visit:    1. Fibromyalgia (Primary)  -     metaxalone (SKELAXIN) 800 MG tablet; Take 1 tablet by mouth 3 (Three) Times a Day As Needed for Muscle Spasms.  Dispense: 90 tablet; Refill: 2    2. Fatigue, unspecified type  -     Comprehensive metabolic panel; Future    3. Neck pain, chronic  -     metaxalone (SKELAXIN) 800 MG tablet; Take 1 tablet by mouth 3 (Three) Times a Day As Needed for Muscle Spasms.  Dispense: 90 tablet; Refill: 2    4. Chronic constipation    5. Brain fog    6. Acquired hypothyroidism  -     TSH; Future  -     T4, free; Future    7. Has poorly balanced diet    8. Pigmented skin lesion of uncertain behavior of torso  -     Ambulatory Referral to Dermatology    9. Hypercalcemia    10. Cognitive disorder    11. Toenail fungus    12. Chronic anxiety    13. Excessive consumption of carbonated soft drinks    14. Vitamin D deficiency  -     Vitamin D 25 hydroxy; Future    15. Hyperglycemia  -     Hemoglobin A1c; Future    16. Hyperinsulinemia  -     Hemoglobin A1c; Future    She will have derm look at the toenails as well as the nevus of concern.        Follow Up   Return in about 26 days (around 12/7/2021) for lab results, recheck wt, Vit D and thyroid.    Patient was given instructions and counseling regarding her condition  or for health maintenance advice.   - See dermatology about the toenail fungus issues and the mole on the right side  - Continue current dose of the thyroid medication  - Do non fasting labs the first of December to recheck the thyroid, vitamin D and sugar issues.     Please see specific information/handouts pulled into the AVS if appropriate.         Rosalind Quiñones M.D.  [facility]

## 2021-11-18 ENCOUNTER — TELEMEDICINE (OUTPATIENT)
Dept: PSYCHIATRY | Facility: CLINIC | Age: 46
End: 2021-11-18

## 2021-11-18 DIAGNOSIS — G47.9 SLEEP DIFFICULTIES: ICD-10-CM

## 2021-11-18 DIAGNOSIS — Z86.59 HISTORY OF ATTENTION DEFICIT HYPERACTIVITY DISORDER (ADHD): ICD-10-CM

## 2021-11-18 DIAGNOSIS — F41.1 GENERALIZED ANXIETY DISORDER: ICD-10-CM

## 2021-11-18 DIAGNOSIS — F33.0 MILD EPISODE OF RECURRENT MAJOR DEPRESSIVE DISORDER (HCC): ICD-10-CM

## 2021-11-18 PROCEDURE — 99214 OFFICE O/P EST MOD 30 MIN: CPT | Performed by: NURSE PRACTITIONER

## 2021-11-18 RX ORDER — TRAZODONE HYDROCHLORIDE 50 MG/1
25-50 TABLET ORAL NIGHTLY PRN
Qty: 90 TABLET | Refills: 0 | Status: SHIPPED | OUTPATIENT
Start: 2021-11-18 | End: 2021-11-18 | Stop reason: SDUPTHER

## 2021-11-18 RX ORDER — CLONIDINE HYDROCHLORIDE 0.2 MG/1
0.2 TABLET ORAL NIGHTLY
Qty: 90 TABLET | Refills: 0 | Status: SHIPPED | OUTPATIENT
Start: 2021-11-18 | End: 2022-02-09 | Stop reason: SDUPTHER

## 2021-11-18 RX ORDER — DULOXETIN HYDROCHLORIDE 60 MG/1
60 CAPSULE, DELAYED RELEASE ORAL DAILY
Qty: 90 CAPSULE | Refills: 0 | Status: SHIPPED | OUTPATIENT
Start: 2021-11-18 | End: 2021-12-23

## 2021-11-18 RX ORDER — ATOMOXETINE 40 MG/1
40 CAPSULE ORAL DAILY
Qty: 30 CAPSULE | Refills: 0 | Status: SHIPPED | OUTPATIENT
Start: 2021-11-18 | End: 2021-12-16

## 2021-11-18 RX ORDER — HYDROXYZINE HYDROCHLORIDE 25 MG/1
25 TABLET, FILM COATED ORAL 3 TIMES DAILY PRN
Qty: 90 TABLET | Refills: 1 | Status: SHIPPED | OUTPATIENT
Start: 2021-11-18 | End: 2022-01-25

## 2021-11-18 RX ORDER — LAMOTRIGINE 25 MG/1
TABLET ORAL
Qty: 45 TABLET | Refills: 0 | Status: SHIPPED | OUTPATIENT
Start: 2021-11-18 | End: 2021-12-16

## 2021-11-18 RX ORDER — TRAZODONE HYDROCHLORIDE 50 MG/1
25-50 TABLET ORAL NIGHTLY PRN
Qty: 90 TABLET | Refills: 0 | Status: SHIPPED | OUTPATIENT
Start: 2021-11-18 | End: 2022-02-09 | Stop reason: SDUPTHER

## 2021-11-18 NOTE — PROGRESS NOTES
"This provider is located at [facility], [address] [place] [address].The Patient is seen remotely at home, [address] [place] [address] via [employer]. Patient is being seen via telehealth and confirm that they are in a secure environment for this session. The patient's condition being diagnosed/treated is appropriate for telemedicine. The provider identified himself/herself: herself as well as her credentials.   The patient gave consent to be seen remotely, and when consent is given they understand that the consent allows for patient identifiable information to be sent to a third party as needed.   They may refuse to be seen remotely at any time. The electronic data is encrypted and password protected, and the patient has been advised of the potential risks to privacy not withstanding such measures.    You have chosen to receive care through a telehealth visit.  Do you consent to use a video/audio connection for your medical care today? Yes.  Consent was verified via name date of birth and address as well as location.        Chief Complaint  Focus and attention    Subjective    Kimberly Morgan presents to [facility] for medication management.     History of Present Illness   Patient presents today reporting that she feels as if she is not taking any medications.  She states that she is forgetting everything.  Patient reports that \"memory is horrible and tired all the time and out of sorts\".  Patient states that she has to be in a routine and even then sometimes being in the routine she still not getting her job done like she wants to.  Patient states her mood is fine and she just feels depressed when she cannot focus or pay attention.  Patient gives an example as she states at work for a moment she could not find the 11 for a few seconds.  Inquired about the patient's anxiety and she states it has been okay but she is taking the hydroxyzine " and propanolol more on a scheduled basis which helps.  She reports that she is sleeping good.  Patient states appetite is okay as she still craves sweets at times.  Patient has now failed and tried Vyvanse, Adderall, methylphenidate and Concerta without any benefit.  Patient denied any hypomanic or manic episodes.  Patient just notes that her main concern at this time is her focus and attention.  Denies any SI/HI/AH/VH.      Objective   Vital Signs:   There were no vitals taken for this visit.  Due to the remote nature of this encounter (virtual encounter), vitals were unable to be obtained.  Height stated at 66 inches.  Weight stated at 184 pounds.      PHQ-9 Score:   PHQ-9 Total Score:       Patient screened positive for depression based on a PHQ-9 score of 1 on 6/21/2021. Follow-up recommendations include: see notes and medication list.        Mental Status Exam:   Hygiene:   good  Cooperation:  Cooperative  Eye Contact:  Good  Psychomotor Behavior:  Restless  Affect:  Appropriate  Mood: normal and anxious  Speech:  Normal  Thought Process:  Goal directed and Linear  Thought Content:  Normal  Suicidal:  None  Homicidal:  None  Hallucinations:  None  Delusion:  None  Memory:  Intact  Orientation:  Person, Place, Time and Situation  Reliability:  good  Insight:  Good  Judgement:  Good and Fair  Impulse Control:  Good and Fair  Physical/Medical Issues:  Yes hypothyroidism and fibromylgia     Current Medications:   Current Outpatient Medications   Medication Sig Dispense Refill   • atomoxetine (Strattera) 40 MG capsule Take 1 capsule by mouth Daily. 30 capsule 0   • cloNIDine (Catapres) 0.2 MG tablet Take 1 tablet by mouth Every Night. 90 tablet 0   • diclofenac (VOLTAREN) 50 MG EC tablet Take 1 tablet by mouth 2 (Two) Times a Day. 60 tablet 1   • diclofenac (VOLTAREN) 75 MG EC tablet Take 1 tablet by mouth 2 (Two) Times a Day. For chronic pain and inflammation 60 tablet 2   • Diclofenac Sodium (VOLTAREN) 1 % gel gel  Apply 4 gram topically to the appropriate area as directed 4 (Four) Times a Day As Needed. 300 g 2   • DULoxetine (CYMBALTA) 60 MG capsule Take 1 capsule by mouth Daily for mood and fibromyalgia 90 capsule 0   • hydrOXYzine (ATARAX) 25 MG tablet Take 1 tablet by mouth 3 (Three) Times a Day As Needed for Anxiety. 90 tablet 1   • lamoTRIgine (LaMICtal) 25 MG tablet Take 1 tablet daily for 2 weeks if no side effects or rash take 2 tablets daily. 45 tablet 0   • levothyroxine (SYNTHROID, LEVOTHROID) 150 MCG tablet Take 1 tablet by mouth Every Morning Before Breakfast. For thyroid 90 tablet 1   • lisdexamfetamine (Vyvanse) 40 MG capsule Take 1 capsule by mouth Every Morning 30 capsule 0   • metaxalone (SKELAXIN) 800 MG tablet Take 1 tablet by mouth 3 (Three) Times a Day As Needed for Muscle Spasms. 90 tablet 2   • metroNIDAZOLE (METROGEL) 0.75 % gel Apply to upper cheeks and nose where redness occurs. 45 g 1   • omeprazole (PrilOSEC) 20 MG capsule Take 1 capsule by mouth daily. Take 30 minutes before a meal for stomach acid control 30 capsule 1   • ondansetron (Zofran) 4 MG tablet Take 1 tablet by mouth 3 (Three) Times a Day. 30 tablet 1   • pregabalin (LYRICA) 150 MG capsule Take 1 capsule by mouth twice daily and 2 capsules at bedtime. 90 capsule 1   • pregabalin (LYRICA) 150 MG capsule Take 1 capsule by mouth 2 (Two) Times a Day AND 2 capsules Every Night. 90 capsule 1   • propranolol (INDERAL) 10 MG tablet Take 1 tablet by mouth 3 (Three) Times a Day As Needed (for anxiety or racing heart beats.). 90 tablet 1   • silver sulfadiazine (Silvadene) 1 % cream Apply  topically to the appropriate area as directed 2 (Two) Times a Day. For burn treatment 400 g 2   • traZODone (DESYREL) 50 MG tablet Take one-half to one tablet by mouth at night as needed for sleep. 90 tablet 0   • vitamin D (ERGOCALCIFEROL) 1.25 MG (11616 UT) capsule capsule Take one capsule by mouth twice weekly with food 8 capsule 5     Current  Facility-Administered Medications   Medication Dose Route Frequency Provider Last Rate Last Admin   • cyanocobalamin injection 1,000 mcg  1,000 mcg Intramuscular Q28 Days Rosalind Quiñones MD   1,000 mcg at 11/02/21 1338       Physical Exam  Nursing note reviewed.   Constitutional:       Appearance: Normal appearance.   Neurological:      Mental Status: She is alert.   Psychiatric:         Attention and Perception: Perception normal. She is inattentive.         Mood and Affect: Affect normal. Mood is anxious. Mood is not depressed. Affect is not tearful.         Speech: Speech normal.         Behavior: Behavior is cooperative.         Thought Content: Thought content normal.         Cognition and Memory: Cognition normal. She does not exhibit impaired recent memory.         Judgment: Judgment normal.        Result Review :     The following data was reviewed by: KATY Gallagher on 04/01/2021:  Common labs    Common Labsle 2/17/21 2/17/21 4/5/21 4/5/21 9/30/21 9/30/21    1204 1204 1028 1028 1102 1102   Glucose  97  80 120 (A)    BUN  19  14 20    Creatinine  0.70  0.92 0.80    eGFR Non  Am  90  66 77    Sodium  139  136 138    Potassium  3.8  3.7 3.6    Chloride  102  99 101    Calcium  9.5  8.5 (A) 9.1    Albumin  4.40  4.10 4.30    Total Bilirubin  0.4  0.3 0.2    Alkaline Phosphatase  79  63 64    AST (SGOT)  16  18 13    ALT (SGPT)  13  14 13    WBC 8.41     11.59 (A)   Hemoglobin 14.1     13.8   Hematocrit 41.5     42.8   Platelets 208     215   Total Cholesterol   281 (A)      Triglycerides   259 (A)      HDL Cholesterol   52      LDL Cholesterol    180 (A)      (A) Abnormal value            CMP    CMP 2/17/21 4/5/21 9/30/21   Glucose 97 80 120 (A)   BUN 19 14 20   Creatinine 0.70 0.92 0.80   eGFR Non African Am 90 66 77   Sodium 139 136 138   Potassium 3.8 3.7 3.6   Chloride 102 99 101   Calcium 9.5 8.5 (A) 9.1   Albumin 4.40 4.10 4.30   Total Bilirubin 0.4 0.3 0.2   Alkaline Phosphatase 79 63 64    AST (SGOT) 16 18 13   ALT (SGPT) 13 14 13   (A) Abnormal value            CBC    CBC 2/17/21 9/30/21   WBC 8.41 11.59 (A)   RBC 4.95 4.62   Hemoglobin 14.1 13.8   Hematocrit 41.5 42.8   MCV 83.8 92.6   MCH 28.5 29.9   MCHC 34.0 32.2   RDW 14.5 12.5   Platelets 208 215   (A) Abnormal value            CBC w/diff    CBC w/Diff 6/29/20 8/26/20 2/17/21   WBC 8.88 9.6 8.41   RBC 5.22 4.82 4.95   Hemoglobin 14.9 14.2 14.1   Hematocrit 45.7 43.4 41.5   MCV 87.5 90.0 83.8   MCH 28.5 29.5 28.5   MCHC 32.6 32.7 (A) 34.0   RDW 12.9 13.0 14.5   Platelets 255 243 208   Neutrophil Rel % 44.6 52.7 46.8   Immature Granulocyte Rel % 0.2  0.1   Lymphocyte Rel % 47.1 (A) 36.3 42.1   Monocyte Rel % 6.1 9.3 9.0   Eosinophil Rel % 1.5 1 1.3   Basophil Rel % 0.5 0.5 0.7   (A) Abnormal value            Lipid Panel    Lipid Panel 4/5/21   Total Cholesterol 281 (A)   Triglycerides 259 (A)   HDL Cholesterol 52   VLDL Cholesterol 49 (A)   LDL Cholesterol  180 (A)   LDL/HDL Ratio 3.41   (A) Abnormal value            TSH    TSH 5/17/21 7/15/21 9/30/21   TSH 46.500 (A) 0.568 0.098 (A)   (A) Abnormal value            HgB    HGB 2/17/21 9/30/21   Hemoglobin 14.1 13.8           UA    Urinalysis 12/2/20 12/2/20 2/17/21 9/30/21 9/30/21    1141 1141  1102 1102   Squamous Epithelial Cells, UA  3-6 (A)   7-12 (A)   Specific [place], UA 1.016  1.012 1.023    Ketones, UA Negative  Negative Trace (A)    Blood, UA Negative  Negative Negative    Leukocytes, UA Trace (A)  Negative Trace (A)    Nitrite, UA Negative  Negative Negative    RBC, UA  0-2 (A)   None Seen   WBC, UA  0-2 (A)   0-2   Bacteria, UA  4+ (A)   4+ (A)   (A) Abnormal value            Data reviewed: PCP notes        Assessment and Plan    Problem List Items Addressed This Visit        Sleep    Sleep difficulties    Relevant Medications    traZODone (DESYREL) 50 MG tablet    cloNIDine (Catapres) 0.2 MG tablet      Other Visit Diagnoses     Mild episode of recurrent major depressive disorder  (HCC)        Relevant Medications    atomoxetine (Strattera) 40 MG capsule    traZODone (DESYREL) 50 MG tablet    hydrOXYzine (ATARAX) 25 MG tablet    DULoxetine (CYMBALTA) 60 MG capsule    lamoTRIgine (LaMICtal) 25 MG tablet    Generalized anxiety disorder        Relevant Medications    atomoxetine (Strattera) 40 MG capsule    traZODone (DESYREL) 50 MG tablet    hydrOXYzine (ATARAX) 25 MG tablet    DULoxetine (CYMBALTA) 60 MG capsule    History of attention deficit hyperactivity disorder (ADHD)        Relevant Medications    atomoxetine (Strattera) 40 MG capsule    cloNIDine (Catapres) 0.2 MG tablet            TREATMENT PLAN/GOALS: Continue supportive psychotherapy efforts and medications as indicated. Treatment and medication options discussed during today's visit. Patient ackowledged and verbally consented to continue with current treatment plan and was educated on the importance of compliance with treatment and follow-up appointments.    MEDICATION ISSUES:  We discussed risks, benefits, and side effects of the above medications and the patient was agreeable with the plan. Patient was educated on the importance of compliance with treatment and follow-up appointments.  Patient is agreeable to call the office with any worsening of symptoms or onset of side effects. Patient is agreeable to call 911 or go to the nearest ER should he/she begin having SI/HI.   We will add Lamictal in an effort to stabilize mood.  The patient was reminded to immediately come to the hospital should there be any loss of control.  Explanation was given to her regarding Lamictal and the potential for Fritz Serafin syndrome and significant rash.  Patient was encouraged to check skin prior to beginning.  Patient was encouraged to report any rash and to immediately stop medication.        -Discontinue Vyvanse since not helpful and noticed worsening symptoms of tiredness.  -Restart Strattera 40 mg daily as patient appeared to be doing better  with her focus and attention and mood compared to stimulants.  -Begin lamotrigine 25 mg for 2 weeks if no side effects or rash begin taking 2 tablets daily for questionable mood disorder.  -Continue clonidine 0.2 mg at night for focus and attention.   -Continue Cymbalta 60 mg daily for depression and anxiety.  -Continue 25-50mg of Trazodone at night prn for sleep.   -Continue hydroxyzine 25 mg 3 times daily as needed for anxiety     -Also explained to patient that she needs to ensure that her MyChart is working as well as her video and audio are enabled.  Since patient has had several issues connecting several different times encouraged her that if she continues to have any more issues than we would have to refer her to someone in person as we cannot continue doing telephone visits as it was not appropriate patient verbalized understanding.    Counseled patient regarding multimodal approach with healthy nutrition, healthy sleep, regular physical activity, social activities, counseling, and medications.      Coping skills reviewed and encouraged positive framing of thoughts     Assisted patient in processing above session content; acknowledged and normalized patient’s thoughts, feelings, and concerns.  Applied  positive coping skills and behavior management in session.  Allowed patient to freely discuss issues without interruption or judgment. Provided safe, confidential environment to facilitate the development of positive therapeutic relationship and encourage open, honest communication. Assisted patient in identifying risk factors which would indicate the need for higher level of care including thoughts to harm self or others and/or self-harming behavior and encouraged patient to contact this office, call 911, or present to the nearest emergency room should any of these events occur. Discussed crisis intervention services and means to access.     MEDS ORDERED DURING VISIT:  New Medications Ordered This Visit    Medications   • atomoxetine (Strattera) 40 MG capsule     Sig: Take 1 capsule by mouth Daily.     Dispense:  30 capsule     Refill:  0   • traZODone (DESYREL) 50 MG tablet     Sig: Take one-half to one tablet by mouth at night as needed for sleep.     Dispense:  90 tablet     Refill:  0   • hydrOXYzine (ATARAX) 25 MG tablet     Sig: Take 1 tablet by mouth 3 (Three) Times a Day As Needed for Anxiety.     Dispense:  90 tablet     Refill:  1   • cloNIDine (Catapres) 0.2 MG tablet     Sig: Take 1 tablet by mouth Every Night.     Dispense:  90 tablet     Refill:  0   • DULoxetine (CYMBALTA) 60 MG capsule     Sig: Take 1 capsule by mouth Daily for mood and fibromyalgia     Dispense:  90 capsule     Refill:  0   • lamoTRIgine (LaMICtal) 25 MG tablet     Sig: Take 1 tablet daily for 2 weeks if no side effects or rash take 2 tablets daily.     Dispense:  45 tablet     Refill:  0           Follow Up   Return in about 4 weeks (around 12/16/2021), or if symptoms worsen or fail to improve, for Recheck.    Patient was given instructions and counseling regarding her condition or for health maintenance advice. Please see specific information pulled into the AVS if appropriate.     Some of the data in this electronic note has been brought forward from a previous encounter, any necessary changes have been made, it has been reviewed by this APRN, and it is accurate.      This document has been electronically signed by KATY Gallagher  November 18, 2021 09:48 EST    Part of this note may be an electronic transcription/translation of spoken language to printed text using the Dragon Dictation System.

## 2021-12-10 ENCOUNTER — LAB (OUTPATIENT)
Dept: LAB | Facility: HOSPITAL | Age: 46
End: 2021-12-10

## 2021-12-10 ENCOUNTER — TRANSCRIBE ORDERS (OUTPATIENT)
Dept: ADMINISTRATIVE | Facility: HOSPITAL | Age: 46
End: 2021-12-10

## 2021-12-10 DIAGNOSIS — R53.83 OTHER FATIGUE: ICD-10-CM

## 2021-12-10 DIAGNOSIS — R53.83 OTHER FATIGUE: Primary | ICD-10-CM

## 2021-12-10 DIAGNOSIS — E78.5 HYPERLIPIDEMIA, UNSPECIFIED HYPERLIPIDEMIA TYPE: ICD-10-CM

## 2021-12-10 DIAGNOSIS — E03.9 HYPOTHYROIDISM, UNSPECIFIED TYPE: Primary | ICD-10-CM

## 2021-12-10 DIAGNOSIS — E03.9 HYPOTHYROIDISM, UNSPECIFIED TYPE: ICD-10-CM

## 2021-12-10 LAB
25(OH)D3 SERPL-MCNC: 37.4 NG/ML (ref 30–100)
ALBUMIN SERPL-MCNC: 4 G/DL (ref 3.5–5.2)
ALBUMIN/GLOB SERPL: 1.4 G/DL
ALP SERPL-CCNC: 71 U/L (ref 39–117)
ALT SERPL W P-5'-P-CCNC: 7 U/L (ref 1–33)
ANION GAP SERPL CALCULATED.3IONS-SCNC: 7 MMOL/L (ref 5–15)
AST SERPL-CCNC: 14 U/L (ref 1–32)
BASOPHILS # BLD AUTO: 0.06 10*3/MM3 (ref 0–0.2)
BASOPHILS NFR BLD AUTO: 0.6 % (ref 0–1.5)
BILIRUB SERPL-MCNC: 0.2 MG/DL (ref 0–1.2)
BUN SERPL-MCNC: 24 MG/DL (ref 6–20)
BUN/CREAT SERPL: 28.9 (ref 7–25)
CALCIUM SPEC-SCNC: 8.9 MG/DL (ref 8.6–10.5)
CHLORIDE SERPL-SCNC: 103 MMOL/L (ref 98–107)
CHOLEST SERPL-MCNC: 222 MG/DL (ref 0–200)
CO2 SERPL-SCNC: 29 MMOL/L (ref 22–29)
CREAT SERPL-MCNC: 0.83 MG/DL (ref 0.57–1)
DEPRECATED RDW RBC AUTO: 41.5 FL (ref 37–54)
EOSINOPHIL # BLD AUTO: 0.12 10*3/MM3 (ref 0–0.4)
EOSINOPHIL NFR BLD AUTO: 1.1 % (ref 0.3–6.2)
ERYTHROCYTE [DISTWIDTH] IN BLOOD BY AUTOMATED COUNT: 12.9 % (ref 12.3–15.4)
FOLATE SERPL-MCNC: 10.5 NG/ML (ref 4.78–24.2)
GFR SERPL CREATININE-BSD FRML MDRD: 74 ML/MIN/1.73
GLOBULIN UR ELPH-MCNC: 2.9 GM/DL
GLUCOSE SERPL-MCNC: 89 MG/DL (ref 65–99)
HBA1C MFR BLD: 5.78 % (ref 4.8–5.6)
HCT VFR BLD AUTO: 41.5 % (ref 34–46.6)
HDLC SERPL-MCNC: 39 MG/DL (ref 40–60)
HGB BLD-MCNC: 13.9 G/DL (ref 12–15.9)
IMM GRANULOCYTES # BLD AUTO: 0.02 10*3/MM3 (ref 0–0.05)
IMM GRANULOCYTES NFR BLD AUTO: 0.2 % (ref 0–0.5)
LDLC SERPL CALC-MCNC: 161 MG/DL (ref 0–100)
LDLC/HDLC SERPL: 4.08 {RATIO}
LYMPHOCYTES # BLD AUTO: 2.86 10*3/MM3 (ref 0.7–3.1)
LYMPHOCYTES NFR BLD AUTO: 26.8 % (ref 19.6–45.3)
MCH RBC QN AUTO: 29.5 PG (ref 26.6–33)
MCHC RBC AUTO-ENTMCNC: 33.5 G/DL (ref 31.5–35.7)
MCV RBC AUTO: 88.1 FL (ref 79–97)
MONOCYTES # BLD AUTO: 0.9 10*3/MM3 (ref 0.1–0.9)
MONOCYTES NFR BLD AUTO: 8.4 % (ref 5–12)
NEUTROPHILS NFR BLD AUTO: 6.72 10*3/MM3 (ref 1.7–7)
NEUTROPHILS NFR BLD AUTO: 62.9 % (ref 42.7–76)
NRBC BLD AUTO-RTO: 0 /100 WBC (ref 0–0.2)
PLATELET # BLD AUTO: 227 10*3/MM3 (ref 140–450)
PMV BLD AUTO: 11.2 FL (ref 6–12)
POTASSIUM SERPL-SCNC: 3.6 MMOL/L (ref 3.5–5.2)
PROT SERPL-MCNC: 6.9 G/DL (ref 6–8.5)
RBC # BLD AUTO: 4.71 10*6/MM3 (ref 3.77–5.28)
SODIUM SERPL-SCNC: 139 MMOL/L (ref 136–145)
T4 FREE SERPL-MCNC: 1.26 NG/DL (ref 0.93–1.7)
TRIGL SERPL-MCNC: 119 MG/DL (ref 0–150)
TSH SERPL DL<=0.05 MIU/L-ACNC: 6.35 UIU/ML (ref 0.27–4.2)
VIT B12 BLD-MCNC: 718 PG/ML (ref 211–946)
VLDLC SERPL-MCNC: 22 MG/DL (ref 5–40)
WBC NRBC COR # BLD: 10.68 10*3/MM3 (ref 3.4–10.8)

## 2021-12-10 PROCEDURE — 36415 COLL VENOUS BLD VENIPUNCTURE: CPT

## 2021-12-10 PROCEDURE — 82306 VITAMIN D 25 HYDROXY: CPT

## 2021-12-10 PROCEDURE — 84443 ASSAY THYROID STIM HORMONE: CPT

## 2021-12-10 PROCEDURE — 82607 VITAMIN B-12: CPT

## 2021-12-10 PROCEDURE — 84439 ASSAY OF FREE THYROXINE: CPT

## 2021-12-10 PROCEDURE — 85025 COMPLETE CBC W/AUTO DIFF WBC: CPT

## 2021-12-10 PROCEDURE — 82746 ASSAY OF FOLIC ACID SERUM: CPT

## 2021-12-10 PROCEDURE — 80053 COMPREHEN METABOLIC PANEL: CPT

## 2021-12-10 PROCEDURE — 80061 LIPID PANEL: CPT

## 2021-12-10 PROCEDURE — 83036 HEMOGLOBIN GLYCOSYLATED A1C: CPT

## 2021-12-14 ENCOUNTER — TRANSCRIBE ORDERS (OUTPATIENT)
Dept: ADMINISTRATIVE | Facility: HOSPITAL | Age: 46
End: 2021-12-14

## 2021-12-14 DIAGNOSIS — M25.552 PAIN IN LEFT HIP: Primary | ICD-10-CM

## 2021-12-16 ENCOUNTER — TELEMEDICINE (OUTPATIENT)
Dept: PSYCHIATRY | Facility: CLINIC | Age: 46
End: 2021-12-16

## 2021-12-16 ENCOUNTER — PRIOR AUTHORIZATION (OUTPATIENT)
Dept: PSYCHIATRY | Facility: CLINIC | Age: 46
End: 2021-12-16

## 2021-12-16 DIAGNOSIS — G47.9 SLEEP DIFFICULTIES: ICD-10-CM

## 2021-12-16 DIAGNOSIS — F33.0 MILD EPISODE OF RECURRENT MAJOR DEPRESSIVE DISORDER (HCC): ICD-10-CM

## 2021-12-16 DIAGNOSIS — F41.1 GENERALIZED ANXIETY DISORDER: ICD-10-CM

## 2021-12-16 DIAGNOSIS — Z86.59 HISTORY OF ATTENTION DEFICIT HYPERACTIVITY DISORDER (ADHD): Primary | ICD-10-CM

## 2021-12-16 PROCEDURE — 99214 OFFICE O/P EST MOD 30 MIN: CPT | Performed by: NURSE PRACTITIONER

## 2021-12-16 NOTE — PROGRESS NOTES
"This provider is located at [facility], [address] [place] [address].The Patient is seen remotely at home, [address] [place] [address] via [employer]. Patient is being seen via telehealth and confirm that they are in a secure environment for this session. The patient's condition being diagnosed/treated is appropriate for telemedicine. The provider identified himself/herself: herself as well as her credentials.   The patient gave consent to be seen remotely, and when consent is given they understand that the consent allows for patient identifiable information to be sent to a third party as needed.   They may refuse to be seen remotely at any time. The electronic data is encrypted and password protected, and the patient has been advised of the potential risks to privacy not withstanding such measures.    You have chosen to receive care through a telehealth visit.  Do you consent to use a video/audio connection for your medical care today? Yes.  Consent was verified via name date of birth and address as well as location.        Chief Complaint  Focus and attention and forgetfulness as well as anxiety    Subjective    Kimberly Morgan presents to [facility] for medication management.     History of Present Illness   Patient presents today reporting that physically she is fine however she is felt more \"disoriented\".  Patient states it is as if she is \"in a fog\".  Patient states that she has been forgetful and mixed up lately as well as trouble finding words and confused but then other times she states that her focus and attention is better.  Patient reports the irritability and agitation has started to become more and comes and goes.  It is evident that it is hard for the patient to form thoughts and words.  So we will discontinue lamotrigine as well as Strattera.  Patient denies any hopeless or helpless symptoms.  Reports her sleep is better and " appetite is unchanged.  Patient states that she has been more anxious and on edge with racing thoughts but it could be due to the time of year.  Denies any SI/HI/AH/VH.      Objective   Vital Signs:   There were no vitals taken for this visit.  Due to the remote nature of this encounter (virtual encounter), vitals were unable to be obtained.  Height stated at 66 inches.  Weight stated at 184 pounds.      PHQ-9 Score:   PHQ-9 Total Score:       Patient screened positive for depression based on a PHQ-9 score of 1 on 6/21/2021. Follow-up recommendations include: see notes and medication list.        Mental Status Exam:   Hygiene:   good  Cooperation:  Cooperative  Eye Contact:  Good  Psychomotor Behavior:  Restless  Affect:  Appropriate  Mood: anxious  Speech:  Normal and slurred  Thought Process:  Goal directed and Linear  Thought Content:  Normal  Suicidal:  None  Homicidal:  None  Hallucinations:  None  Delusion:  None  Memory:  Intact and Deficits  Orientation:  Person, Place, Time and Situation  Reliability:  good  Insight:  Good  Judgement:  Good and Fair  Impulse Control:  Good and Fair  Physical/Medical Issues:  Yes hypothyroidism and fibromylgia     Current Medications:   Current Outpatient Medications   Medication Sig Dispense Refill   • cloNIDine (Catapres) 0.2 MG tablet Take 1 tablet by mouth Every Night. 90 tablet 0   • cyclobenzaprine (FLEXERIL) 5 MG tablet Take 1 tablet by mouth 3 times a day 90 tablet 2   • diclofenac (VOLTAREN) 50 MG EC tablet Take 1 tablet by mouth 2 (Two) Times a Day. 60 tablet 1   • diclofenac (VOLTAREN) 75 MG EC tablet Take 1 tablet by mouth 2 (Two) Times a Day. For chronic pain and inflammation 60 tablet 2   • Diclofenac Sodium (VOLTAREN) 1 % gel gel Apply 4 gram topically to the appropriate area as directed 4 (Four) Times a Day As Needed. 300 g 2   • docusate sodium (COLACE) 100 MG capsule Take 1 capsule by mouth 2 (Two) Times a Day. 60 capsule 2   • DULoxetine (CYMBALTA) 60 MG  capsule Take 1 capsule by mouth Daily for mood and fibromyalgia 90 capsule 0   • fluticasone (FLONASE) 50 MCG/ACT nasal spray Spray 1 spray into each nostril every day 16 g 2   • hydrOXYzine (ATARAX) 25 MG tablet Take 1 tablet by mouth 3 (Three) Times a Day As Needed for Anxiety. 90 tablet 1   • levothyroxine (SYNTHROID, LEVOTHROID) 150 MCG tablet Take 1 tablet by mouth Every Morning Before Breakfast. For thyroid 90 tablet 1   • levothyroxine (SYNTHROID, LEVOTHROID) 150 MCG tablet Take 1 tablet by mouth Daily. 30 tablet 5   • levothyroxine (SYNTHROID, LEVOTHROID) 175 MCG tablet Take 1 tablet by mouth Every Morning. 30 tablet 2   • metaxalone (SKELAXIN) 800 MG tablet Take 1 tablet by mouth 3 (Three) Times a Day As Needed for Muscle Spasms. 90 tablet 2   • metroNIDAZOLE (METROGEL) 0.75 % gel Apply to upper cheeks and nose where redness occurs. 45 g 1   • omeprazole (PrilOSEC) 20 MG capsule Take 1 capsule by mouth daily. Take 30 minutes before a meal for stomach acid control 30 capsule 1   • ondansetron (Zofran) 4 MG tablet Take 1 tablet by mouth 3 (Three) Times a Day. 30 tablet 1   • pregabalin (LYRICA) 150 MG capsule Take 1 capsule by mouth twice daily and 2 capsules at bedtime. 90 capsule 1   • pregabalin (LYRICA) 150 MG capsule Take 1 capsule by mouth 2 (Two) Times a Day AND 2 capsules Every Night. 90 capsule 1   • propranolol (INDERAL) 10 MG tablet Take 1 tablet by mouth 3 (Three) Times a Day As Needed (for anxiety or racing heart beats.). 90 tablet 1   • silver sulfadiazine (Silvadene) 1 % cream Apply  topically to the appropriate area as directed 2 (Two) Times a Day. For burn treatment 400 g 2   • traZODone (DESYREL) 50 MG tablet Take one-half to one tablet by mouth at night as needed for sleep. 90 tablet 0   • Viloxazine HCl  MG capsule sustained-release 24 hr Take 200 mg by mouth Daily for 7 days, THEN 400 mg Daily for 30 days. 67 capsule 1   • vitamin D (ERGOCALCIFEROL) 1.25 MG (46787 UT) capsule  capsule Take one capsule by mouth twice weekly with food 8 capsule 5     Current Facility-Administered Medications   Medication Dose Route Frequency Provider Last Rate Last Admin   • cyanocobalamin injection 1,000 mcg  1,000 mcg Intramuscular Q28 Days Rosalind Quiñones MD   1,000 mcg at 11/02/21 1338       Physical Exam  Nursing note reviewed.   Constitutional:       Appearance: Normal appearance.   Neurological:      Mental Status: She is alert.   Psychiatric:         Attention and Perception: Perception normal. She is inattentive.         Mood and Affect: Affect normal. Mood is anxious. Mood is not depressed. Affect is not tearful.         Speech: Speech normal.         Behavior: Behavior is cooperative.         Thought Content: Thought content normal.         Cognition and Memory: Cognition normal. She does not exhibit impaired recent memory.         Judgment: Judgment normal.        Result Review :     The following data was reviewed by: KATY Gallagher on 04/01/2021:  Common labs    Common Labsle 4/5/21 4/5/21 9/30/21 9/30/21 12/10/21 12/10/21 12/10/21 12/10/21    1028 1028 1102 1102 1001 1001 1001 1001   Glucose  80 120 (A)    89    BUN  14 20    24 (A)    Creatinine  0.92 0.80    0.83    eGFR Non  Am  66 77    74    Sodium  136 138    139    Potassium  3.7 3.6    3.6    Chloride  99 101    103    Calcium  8.5 (A) 9.1    8.9    Albumin  4.10 4.30    4.00    Total Bilirubin  0.3 0.2    0.2    Alkaline Phosphatase  63 64    71    AST (SGOT)  18 13    14    ALT (SGPT)  14 13    7    WBC    11.59 (A)  10.68     Hemoglobin    13.8  13.9     Hematocrit    42.8  41.5     Platelets    215  227     Total Cholesterol 281 (A)       222 (A)   Triglycerides 259 (A)       119   HDL Cholesterol 52       39 (A)   LDL Cholesterol  180 (A)       161 (A)   Hemoglobin A1C     5.78 (A)      (A) Abnormal value       Comments are available for some flowsheets but are not being displayed.           CMP    CMP 4/5/21  9/30/21 12/10/21   Glucose 80 120 (A) 89   BUN 14 20 24 (A)   Creatinine 0.92 0.80 0.83   eGFR Non African Am 66 77 74   Sodium 136 138 139   Potassium 3.7 3.6 3.6   Chloride 99 101 103   Calcium 8.5 (A) 9.1 8.9   Albumin 4.10 4.30 4.00   Total Bilirubin 0.3 0.2 0.2   Alkaline Phosphatase 63 64 71   AST (SGOT) 18 13 14   ALT (SGPT) 14 13 7   (A) Abnormal value       Comments are available for some flowsheets but are not being displayed.           CBC    CBC 2/17/21 9/30/21 12/10/21   WBC 8.41 11.59 (A) 10.68   RBC 4.95 4.62 4.71   Hemoglobin 14.1 13.8 13.9   Hematocrit 41.5 42.8 41.5   MCV 83.8 92.6 88.1   MCH 28.5 29.9 29.5   MCHC 34.0 32.2 33.5   RDW 14.5 12.5 12.9   Platelets 208 215 227   (A) Abnormal value            CBC w/diff    CBC w/Diff 6/29/20 8/26/20 2/17/21   WBC 8.88 9.6 8.41   RBC 5.22 4.82 4.95   Hemoglobin 14.9 14.2 14.1   Hematocrit 45.7 43.4 41.5   MCV 87.5 90.0 83.8   MCH 28.5 29.5 28.5   MCHC 32.6 32.7 (A) 34.0   RDW 12.9 13.0 14.5   Platelets 255 243 208   Neutrophil Rel % 44.6 52.7 46.8   Immature Granulocyte Rel % 0.2  0.1   Lymphocyte Rel % 47.1 (A) 36.3 42.1   Monocyte Rel % 6.1 9.3 9.0   Eosinophil Rel % 1.5 1 1.3   Basophil Rel % 0.5 0.5 0.7   (A) Abnormal value            Lipid Panel    Lipid Panel 4/5/21 12/10/21   Total Cholesterol 281 (A) 222 (A)   Triglycerides 259 (A) 119   HDL Cholesterol 52 39 (A)   VLDL Cholesterol 49 (A) 22   LDL Cholesterol  180 (A) 161 (A)   LDL/HDL Ratio 3.41 4.08   (A) Abnormal value            TSH    TSH 7/15/21 9/30/21 12/10/21   TSH 0.568 0.098 (A) 6.350 (A)   (A) Abnormal value            HgB    HGB 2/17/21 9/30/21 12/10/21   Hemoglobin 14.1 13.8 13.9           UA    Urinalysis 2/17/21 9/30/21 9/30/21     1102 1102   Squamous Epithelial Cells, UA   7-12 (A)   Specific [place], UA 1.012 1.023    Ketones, UA Negative Trace (A)    Blood, UA Negative Negative    Leukocytes, UA Negative Trace (A)    Nitrite, UA Negative Negative    RBC, UA   None Seen   WBC,  UA   0-2   Bacteria, UA   4+ (A)   (A) Abnormal value            Data reviewed: PCP notes        Assessment and Plan    Problem List Items Addressed This Visit        Sleep    Sleep difficulties      Other Visit Diagnoses     History of attention deficit hyperactivity disorder (ADHD)    -  Primary    Relevant Medications    Viloxazine HCl  MG capsule sustained-release 24 hr    Mild episode of recurrent major depressive disorder (HCC)        Relevant Medications    Viloxazine HCl  MG capsule sustained-release 24 hr    Generalized anxiety disorder        Relevant Medications    Viloxazine HCl  MG capsule sustained-release 24 hr            TREATMENT PLAN/GOALS: Continue supportive psychotherapy efforts and medications as indicated. Treatment and medication options discussed during today's visit. Patient ackowledged and verbally consented to continue with current treatment plan and was educated on the importance of compliance with treatment and follow-up appointments.    MEDICATION ISSUES:  We discussed risks, benefits, and side effects of the above medications and the patient was agreeable with the plan. Patient was educated on the importance of compliance with treatment and follow-up appointments.  Patient is agreeable to call the office with any worsening of symptoms or onset of side effects. Patient is agreeable to call 911 or go to the nearest ER should he/she begin having SI/HI.   We will add Lamictal in an effort to stabilize mood.  The patient was reminded to immediately come to the hospital should there be any loss of control.  Explanation was given to her regarding Lamictal and the potential for Fritz Serafin syndrome and significant rash.  Patient was encouraged to check skin prior to beginning.  Patient was encouraged to report any rash and to immediately stop medication.        -Discontinue Strattera.  -Begin Qelbree 200mg for 1 week then 400mg daily.  Patient has now failed and tried  Adderall, Adderall XR, Focalin, Concerta, Vyvanse as well as Strattera and bupropion without any benefits and side effects.  -Stop Lamictal due to side effects of forgetfulness increasing.   -Continue clonidine 0.2 mg at night for focus and attention.   -Continue Cymbalta 60 mg daily for depression and anxiety.  -Continue 25-50mg of Trazodone at night prn for sleep.   -Continue hydroxyzine 25 mg 3 times daily as needed for anxiety     -Also discussed possible therapy with the patient as well as discussed sensory and learning disabilities.  We will continue to evaluate focus and attention but if patient's forgetfulness or mixing up words or orientation were to get worse then would suggest follow-up with PCP and neurology for further work-up.    Counseled patient regarding multimodal approach with healthy nutrition, healthy sleep, regular physical activity, social activities, counseling, and medications.      Coping skills reviewed and encouraged positive framing of thoughts     Assisted patient in processing above session content; acknowledged and normalized patient’s thoughts, feelings, and concerns.  Applied  positive coping skills and behavior management in session.  Allowed patient to freely discuss issues without interruption or judgment. Provided safe, confidential environment to facilitate the development of positive therapeutic relationship and encourage open, honest communication. Assisted patient in identifying risk factors which would indicate the need for higher level of care including thoughts to harm self or others and/or self-harming behavior and encouraged patient to contact this office, call 911, or present to the nearest emergency room should any of these events occur. Discussed crisis intervention services and means to access.     MEDS ORDERED DURING VISIT:  New Medications Ordered This Visit   Medications   • Viloxazine HCl  MG capsule sustained-release 24 hr     Sig: Take 200 mg by mouth  Daily for 7 days, THEN 400 mg Daily for 30 days.     Dispense:  67 capsule     Refill:  1           Follow Up   Return in about 5 weeks (around 1/20/2022), or if symptoms worsen or fail to improve, for Recheck.    Patient was given instructions and counseling regarding her condition or for health maintenance advice. Please see specific information pulled into the AVS if appropriate.     Some of the data in this electronic note has been brought forward from a previous encounter, any necessary changes have been made, it has been reviewed by this APRN, and it is accurate.      This document has been electronically signed by KATY Gallagher  December 16, 2021 10:28 EST    Part of this note may be an electronic transcription/translation of spoken language to printed text using the Dragon Dictation System.

## 2021-12-23 ENCOUNTER — HOSPITAL ENCOUNTER (OUTPATIENT)
Dept: GENERAL RADIOLOGY | Facility: HOSPITAL | Age: 46
Discharge: HOME | End: 2021-12-23
Admitting: PHYSICAL MEDICINE & REHABILITATION

## 2021-12-23 ENCOUNTER — OFFICE VISIT (OUTPATIENT)
Dept: OBSTETRICS AND GYNECOLOGY | Facility: CLINIC | Age: 46
End: 2021-12-23

## 2021-12-23 VITALS
BODY MASS INDEX: 31.49 KG/M2 | SYSTOLIC BLOOD PRESSURE: 122 MMHG | HEIGHT: 65 IN | DIASTOLIC BLOOD PRESSURE: 74 MMHG | WEIGHT: 189 LBS

## 2021-12-23 DIAGNOSIS — N95.1 PERIMENOPAUSE: ICD-10-CM

## 2021-12-23 DIAGNOSIS — M25.552 PAIN IN LEFT HIP: ICD-10-CM

## 2021-12-23 DIAGNOSIS — E66.09 CLASS 1 OBESITY DUE TO EXCESS CALORIES WITHOUT SERIOUS COMORBIDITY WITH BODY MASS INDEX (BMI) OF 31.0 TO 31.9 IN ADULT: ICD-10-CM

## 2021-12-23 DIAGNOSIS — Z01.419 WELL WOMAN EXAM WITH ROUTINE GYNECOLOGICAL EXAM: Primary | ICD-10-CM

## 2021-12-23 DIAGNOSIS — Z80.3 FAMILY HISTORY OF BREAST CANCER: ICD-10-CM

## 2021-12-23 DIAGNOSIS — L98.9 SKIN LESIONS: ICD-10-CM

## 2021-12-23 PROCEDURE — 99396 PREV VISIT EST AGE 40-64: CPT | Performed by: NURSE PRACTITIONER

## 2021-12-23 PROCEDURE — 87625 HPV TYPES 16 & 18 ONLY: CPT | Performed by: NURSE PRACTITIONER

## 2021-12-23 PROCEDURE — 73502 X-RAY EXAM HIP UNI 2-3 VIEWS: CPT

## 2021-12-23 PROCEDURE — 87624 HPV HI-RISK TYP POOLED RSLT: CPT | Performed by: NURSE PRACTITIONER

## 2021-12-23 PROCEDURE — G0123 SCREEN CERV/VAG THIN LAYER: HCPCS | Performed by: NURSE PRACTITIONER

## 2021-12-23 NOTE — PROGRESS NOTES
Kimberly Morgan is a 46 y.o.      Chief Complaint   Patient presents with   • Annual Exam     Patient here for annual. Last PAP was 2017 and was abnormal. Last MAMMO was 10/2021 and was normal. Patient voices no complaints or concerns at this time.           HPI - LMP 12/9 and usually has menses, occ. Skips.  Her 1st 2 days are heavy but not excessive.  Her recent HGB was 13.9.  Her mammogram is current.  She voiced no gyn concerns.      The following portions of the patient's history were reviewed and updated as appropriate:vital signs, allergies, current medications, past family history, past medical history, past social history, past surgical history and problem list.      Current Outpatient Medications:   •  cloNIDine (Catapres) 0.2 MG tablet, Take 1 tablet by mouth Every Night., Disp: 90 tablet, Rfl: 0  •  cyclobenzaprine (FLEXERIL) 5 MG tablet, Take 1 tablet by mouth 3 times a day (Patient taking differently: As needed), Disp: 90 tablet, Rfl: 2  •  diclofenac (VOLTAREN) 75 MG EC tablet, Take 1 tablet by mouth 2 (Two) Times a Day. For chronic pain and inflammation, Disp: 60 tablet, Rfl: 2  •  Diclofenac Sodium (VOLTAREN) 1 % gel gel, Apply 4 gram topically to the appropriate area as directed 4 (Four) Times a Day As Needed., Disp: 300 g, Rfl: 2  •  docusate sodium (COLACE) 100 MG capsule, Take 1 capsule by mouth 2 (Two) Times a Day., Disp: 60 capsule, Rfl: 2  •  fluticasone (FLONASE) 50 MCG/ACT nasal spray, Spray 1 spray into each nostril every day, Disp: 16 g, Rfl: 2  •  hydrOXYzine (ATARAX) 25 MG tablet, Take 1 tablet by mouth 3 (Three) Times a Day As Needed for Anxiety., Disp: 90 tablet, Rfl: 1  •  levothyroxine (SYNTHROID, LEVOTHROID) 175 MCG tablet, Take 1 tablet by mouth Every Morning., Disp: 30 tablet, Rfl: 2  •  omeprazole (PrilOSEC) 20 MG capsule, Take 1 capsule by mouth daily. Take 30 minutes before a meal for stomach acid control, Disp: 30 capsule, Rfl: 1  •  ondansetron (Zofran) 4 MG  tablet, Take 1 tablet by mouth 3 (Three) Times a Day., Disp: 30 tablet, Rfl: 1  •  pregabalin (LYRICA) 150 MG capsule, Take 1 capsule by mouth 2 (Two) Times a Day AND 2 capsules Every Night., Disp: 90 capsule, Rfl: 1  •  silver sulfadiazine (Silvadene) 1 % cream, Apply  topically to the appropriate area as directed 2 (Two) Times a Day. For burn treatment, Disp: 400 g, Rfl: 2  •  traZODone (DESYREL) 50 MG tablet, Take one-half to one tablet by mouth at night as needed for sleep., Disp: 90 tablet, Rfl: 0  •  Viloxazine HCl  MG capsule sustained-release 24 hr, Take 200 mg by mouth Daily for 7 days, THEN 400 mg Daily for 30 days., Disp: 67 capsule, Rfl: 1  •  levothyroxine (SYNTHROID, LEVOTHROID) 150 MCG tablet, Take 1 tablet by mouth Every Morning Before Breakfast. For thyroid, Disp: 90 tablet, Rfl: 1  •  levothyroxine (SYNTHROID, LEVOTHROID) 150 MCG tablet, Take 1 tablet by mouth Daily., Disp: 30 tablet, Rfl: 5  •  metaxalone (SKELAXIN) 800 MG tablet, Take 1 tablet by mouth 3 (Three) Times a Day As Needed for Muscle Spasms., Disp: 90 tablet, Rfl: 2  •  pregabalin (LYRICA) 150 MG capsule, Take 1 capsule by mouth twice daily and 2 capsules at bedtime., Disp: 90 capsule, Rfl: 1  •  propranolol (INDERAL) 10 MG tablet, Take 1 tablet by mouth 3 (Three) Times a Day As Needed (for anxiety or racing heart beats.)., Disp: 90 tablet, Rfl: 1  •  vitamin D (ERGOCALCIFEROL) 1.25 MG (25062 UT) capsule capsule, Take one capsule by mouth twice weekly with food, Disp: 8 capsule, Rfl: 5    Current Facility-Administered Medications:   •  cyanocobalamin injection 1,000 mcg, 1,000 mcg, Intramuscular, Q28 Days, Rosalind Quiñones MD, 1,000 mcg at 11/02/21 1338    Review of Systems   Constitutional: Negative for chills and fever.   HENT: Negative for congestion, ear pain, sneezing, sore throat and tinnitus.    Eyes: Negative for blurred vision, redness, itching and visual disturbance.   Respiratory: Negative for apnea, choking and  "shortness of breath.    Cardiovascular: Negative for chest pain and palpitations.        History of tachycardia   Gastrointestinal: Positive for GERD. Negative for abdominal distention, nausea and vomiting.   Endocrine: Negative for cold intolerance, polydipsia and polyuria.        History of Graves   Genitourinary: Positive for menstrual problem (heavy menses first 2-3 days but not excessive). Negative for breast pain, decreased urine volume, flank pain, pelvic pain, vaginal bleeding and vaginal pain.        Perimenopause with cyclic menses with occ. Skipping  S/P TL   Musculoskeletal: Negative for gait problem and neck pain.        Fibromyalgia   Skin: Negative for color change and pallor.   Neurological: Negative for dizziness, tremors, seizures, speech difficulty, light-headedness and memory problem.   Psychiatric/Behavioral: Negative for behavioral problems, self-injury and suicidal ideas.        ADD         Objective     /74   Ht 165.1 cm (65\")   Wt 85.7 kg (189 lb)   LMP 12/09/2021 (Exact Date)   Breastfeeding No   BMI 31.45 kg/m²       Physical Exam  Vitals and nursing note reviewed. Exam conducted with a chaperone present.   Constitutional:       General: She is not in acute distress.     Appearance: She is well-developed. She is not diaphoretic.   HENT:      Head: Normocephalic.      Right Ear: External ear normal.      Left Ear: External ear normal.      Nose: Nose normal.   Eyes:      General: No scleral icterus.        Right eye: No discharge.         Left eye: No discharge.      Conjunctiva/sclera: Conjunctivae normal.      Pupils: Pupils are equal, round, and reactive to light.   Neck:      Thyroid: No thyromegaly.      Vascular: No carotid bruit.      Trachea: No tracheal deviation.   Cardiovascular:      Rate and Rhythm: Normal rate and regular rhythm.      Heart sounds: Normal heart sounds. No murmur heard.      Pulmonary:      Effort: Pulmonary effort is normal. No respiratory distress. "      Breath sounds: Normal breath sounds. No wheezing.   Chest:   Breasts: Breasts are symmetrical.      Right: Skin change present. No swelling, bleeding, inverted nipple, mass, nipple discharge, tenderness, axillary adenopathy or supraclavicular adenopathy.      Left: Normal. No swelling, bleeding, inverted nipple, mass, nipple discharge, skin change, tenderness, axillary adenopathy or supraclavicular adenopathy.            Comments: Oval lesion on nipple at about 4 o'clock and is mod. Dark, she states it has been there 3-4 years    Lower outer quadrant dark mole eraser size  Abdominal:      General: There is no distension.      Palpations: Abdomen is soft. There is no mass.      Tenderness: There is no abdominal tenderness. There is no right CVA tenderness, left CVA tenderness or guarding.      Hernia: No hernia is present. There is no hernia in the left inguinal area or right inguinal area.   Genitourinary:     General: Normal vulva.      Exam position: Lithotomy position.      Labia:         Right: No rash, tenderness, lesion or injury.         Left: No rash, tenderness, lesion or injury.       Vagina: Normal. No signs of injury and foreign body. No vaginal discharge, erythema, tenderness or bleeding.      Cervix: Normal.      Uterus: Normal. Not enlarged, not fixed and not tender.       Adnexa: Right adnexa normal and left adnexa normal.        Right: No mass, tenderness or fullness.          Left: No mass, tenderness or fullness.        Rectum: Normal. No mass.      Comments:   BSU normal  Urethral meatus  Normal  Perineum  Normal  Musculoskeletal:         General: No tenderness. Normal range of motion.      Cervical back: Normal range of motion and neck supple.   Lymphadenopathy:      Head:      Right side of head: No submental, submandibular, tonsillar, preauricular, posterior auricular or occipital adenopathy.      Left side of head: No submental, submandibular, tonsillar, preauricular, posterior auricular  or occipital adenopathy.      Cervical: No cervical adenopathy.      Right cervical: No superficial, deep or posterior cervical adenopathy.     Left cervical: No superficial, deep or posterior cervical adenopathy.      Upper Body:      Right upper body: No supraclavicular, axillary or pectoral adenopathy.      Left upper body: No supraclavicular, axillary or pectoral adenopathy.      Lower Body: No right inguinal adenopathy. No left inguinal adenopathy.   Skin:     General: Skin is warm and dry.      Findings: No bruising, erythema or rash.   Neurological:      Mental Status: She is alert and oriented to person, place, and time.      Coordination: Coordination normal.   Psychiatric:         Mood and Affect: Mood normal.         Behavior: Behavior normal.         Thought Content: Thought content normal.         Judgment: Judgment normal.            Assessment/Plan       Diagnoses and all orders for this visit:    1. Well woman exam with routine gynecological exam (Primary)  -     Liquid-based Pap Smear, Screening  -     HPV DNA Probe, Direct - ThinPrep Vial, Cervix  -     HPV, Aptima High 16 / 18,45    2. Class 1 obesity due to excess calories without serious comorbidity with body mass index (BMI) of 31.0 to 31.9 in adult  Comments:  BMI is outside the desired parameters and to decrease calories and exercise.    3. Skin lesions  Comments:  right breast as documented and patient states has a referral coming form PCP for dermatologist re: erythema of face.  She will also have the derm. examine the 2 dermal lesions omn the right breast as documented.    4. Perimenopause  Comments:  usually cyclic menses with occ. skipping, 2-3 days heavy (recent HGB13.3)      5. Family history of breast cancer  Comments:  pat. aunt in her 40's or 50's had breast cancer and patient states she consulted re: genetic screening and it was not covered by insurance. I will let Mojgan Truong know and see if there is another option.      Patient  is counseled re: BSE, diet, exercise, mammogram, calcium and Vit. D3                                  Heena Camarena, APRN  12/23/2021

## 2021-12-28 LAB
GEN CATEG CVX/VAG CYTO-IMP: ABNORMAL
HPV I/H RISK 4 DNA CVX QL PROBE+SIG AMP: DETECTED
HPV16 DNA SPEC QL NAA+PROBE: NOT DETECTED
HPV18+45 E6+E7 MRNA CVX QL NAA+PROBE: NOT DETECTED
LAB AP CASE REPORT: ABNORMAL
LAB AP GYN ADDITIONAL INFORMATION: ABNORMAL
PATH INTERP SPEC-IMP: ABNORMAL
STAT OF ADQ CVX/VAG CYTO-IMP: ABNORMAL

## 2021-12-29 NOTE — PROGRESS NOTES
Tell patient that her pap shows a few atypical cells and a positive HPV but the high risks HPV's (the ones that can potentially cause cervical cancer) are negative.  This is just a closer looks  at the cervix in the office.  If any atypical areas noted,, a pinch biopsy will be done to see if any treatment is required.  Cristina, schedule this for her.KATY Renteria

## 2021-12-30 ENCOUNTER — TRANSCRIBE ORDERS (OUTPATIENT)
Dept: ADMINISTRATIVE | Facility: HOSPITAL | Age: 46
End: 2021-12-30

## 2021-12-30 DIAGNOSIS — M25.559 HIP PAIN: Primary | ICD-10-CM

## 2022-01-12 ENCOUNTER — APPOINTMENT (OUTPATIENT)
Dept: MRI IMAGING | Facility: HOSPITAL | Age: 47
End: 2022-01-12

## 2022-01-12 ENCOUNTER — HOSPITAL ENCOUNTER (OUTPATIENT)
Dept: MRI IMAGING | Facility: HOSPITAL | Age: 47
Discharge: HOME | End: 2022-01-12
Admitting: NURSE PRACTITIONER

## 2022-01-12 DIAGNOSIS — M25.559 HIP PAIN: ICD-10-CM

## 2022-01-12 PROCEDURE — 73721 MRI JNT OF LWR EXTRE W/O DYE: CPT

## 2022-01-25 PROCEDURE — 87635 SARS-COV-2 COVID-19 AMP PRB: CPT | Performed by: NURSE PRACTITIONER

## 2022-01-27 ENCOUNTER — APPOINTMENT (OUTPATIENT)
Dept: MRI IMAGING | Facility: HOSPITAL | Age: 47
End: 2022-01-27

## 2022-02-03 ENCOUNTER — PRIOR AUTHORIZATION (OUTPATIENT)
Dept: PSYCHIATRY | Facility: CLINIC | Age: 47
End: 2022-02-03

## 2022-02-03 NOTE — TELEPHONE ENCOUNTER
Ok. Thank you. I didn't think it would go through. She can try to pay out of pocket with that card and not through her insurance but I dont think the pharmacy will fill it. I will discuss this with her at her next appointment on the 9th.

## 2022-02-03 NOTE — TELEPHONE ENCOUNTER
PA WAS SENT OVER IN COVER MY MEDS WITH MEDIMPA[employer] , [facility] NO LONGER HAVE MEDIMPACT MIRIAM SUBMITTED PA WITH CORRECT PAYOR [facility] .

## 2022-02-03 NOTE — TELEPHONE ENCOUNTER
DENIED :     This request has not been approved. Based on the information submitted for review, you did not meet our guideline rules for the requested drug. In order for your request to be approved, your provider would need to show that you have met the guideline rules below. The details below are written in medical language. If you have questions, please contact your provider. In some cases, the requested medication or alternatives offered may have additional approval requirements. Our guideline named [employer] requires that the member meet the following requirements:1. Member has a diagnosis of Attention Deficit Hyperactivity Disorder (ADHD)2. Member's age is between 6 and 17 years oldYour doctor told us that you have a diagnosis of personal history of other mental and behavioral disorders. We have information showing that you are 46 years of age. We do not have information showing that you meet the conditions listed above. This is why your request is denied. Please follow up with your doctor. A written notification letter will follow with additional details.

## 2022-02-03 NOTE — TELEPHONE ENCOUNTER
I CALLED AND SPOKE WITH PATIENT SHE STATES HER INSURANCE HAD PAID FOR IT WHEN SHE WAS FIRST PRESCRIBED IT LOOKS LIKE [facility] DID COMPLETE A PA ON 12-16 FOR PATIENT , MADE PATIENT AWARE I WOULD REACH OUT TO INSURANCE AND PHARMACY .

## 2022-02-08 NOTE — TELEPHONE ENCOUNTER
I CALLED [facility] TO FOLLOW UP ON PATIENTS PA SINCES IT BEEN OVER 72 HRS SPOKE WITH [employer] . CONFIRMED PA WAS APPROVED ON 2/3 . PA NUMBER IS [id number] .    I CALLED AND LEFT PATIENT A VM

## 2022-02-09 ENCOUNTER — TELEMEDICINE (OUTPATIENT)
Dept: PSYCHIATRY | Facility: CLINIC | Age: 47
End: 2022-02-09

## 2022-02-09 DIAGNOSIS — F41.1 GENERALIZED ANXIETY DISORDER: ICD-10-CM

## 2022-02-09 DIAGNOSIS — G47.9 SLEEP DIFFICULTIES: ICD-10-CM

## 2022-02-09 DIAGNOSIS — Z86.59 HISTORY OF ATTENTION DEFICIT HYPERACTIVITY DISORDER (ADHD): ICD-10-CM

## 2022-02-09 DIAGNOSIS — F33.0 MILD EPISODE OF RECURRENT MAJOR DEPRESSIVE DISORDER: Primary | ICD-10-CM

## 2022-02-09 PROCEDURE — 99214 OFFICE O/P EST MOD 30 MIN: CPT | Performed by: NURSE PRACTITIONER

## 2022-02-09 RX ORDER — TRAZODONE HYDROCHLORIDE 50 MG/1
25-50 TABLET ORAL NIGHTLY PRN
Qty: 90 TABLET | Refills: 0 | Status: SHIPPED | OUTPATIENT
Start: 2022-02-09

## 2022-02-09 RX ORDER — DULOXETIN HYDROCHLORIDE 60 MG/1
60 CAPSULE, DELAYED RELEASE ORAL DAILY
Qty: 90 CAPSULE | Refills: 0 | Status: SHIPPED | OUTPATIENT
Start: 2022-02-09 | End: 2022-04-02 | Stop reason: SDUPTHER

## 2022-02-09 RX ORDER — HYDROXYZINE HYDROCHLORIDE 25 MG/1
TABLET, FILM COATED ORAL
Qty: 90 TABLET | Refills: 2 | Status: SHIPPED | OUTPATIENT
Start: 2022-02-09

## 2022-02-09 RX ORDER — CLONIDINE HYDROCHLORIDE 0.2 MG/1
0.2 TABLET ORAL NIGHTLY
Qty: 90 TABLET | Refills: 0 | Status: SHIPPED | OUTPATIENT
Start: 2022-02-09 | End: 2022-04-02 | Stop reason: SDUPTHER

## 2022-02-09 NOTE — PROGRESS NOTES
"This provider is located at [facility], [address] [place] [address].The Patient is seen remotely at home, [address] [place] [address] via [employer]. Patient is being seen via telehealth and confirm that they are in a secure environment for this session. The patient's condition being diagnosed/treated is appropriate for telemedicine. The provider identified himself/herself: herself as well as her credentials.   The patient gave consent to be seen remotely, and when consent is given they understand that the consent allows for patient identifiable information to be sent to a third party as needed.   They may refuse to be seen remotely at any time. The electronic data is encrypted and password protected, and the patient has been advised of the potential risks to privacy not withstanding such measures.    You have chosen to receive care through a telehealth visit.  Do you consent to use a video/audio connection for your medical care today? Yes.  Consent was verified via name date of birth and address as well as location.        Chief Complaint  Focus and attention and forgetfulness as well as anxiety    Subjective    Kimberly Morgan presents to [facility] for medication management.     History of Present Illness   Patient presents today noting that she is been doing better as far as her focus and attention goes.  Patient states that she is able to put thoughts into words and explain things better and getting tasks done.  She states that still not where she wants it to be but much improvement from previously.  Patient states that she has been sick with residual COVID symptoms still and believes that is made her feel down at times but otherwise reports her depression has been \"okay\".  She states that her anxiety has been good is not a major issue.  Patient reports that the \"fog feeling\" has went away.  Patient states that she has been sleeping " good otherwise the past 2 nights have been difficult.  Reports appetite is good.  Denies any side effects to the medications.  Denies any SI/HI/AH/VH.      Objective   Vital Signs:   There were no vitals taken for this visit.  Due to the remote nature of this encounter (virtual encounter), vitals were unable to be obtained.  Height stated at 66 inches.  Weight stated at 184 pounds.      PHQ-9 Score:   PHQ-9 Total Score:       Patient screened positive for depression based on a PHQ-9 score of 1 on 6/21/2021. Follow-up recommendations include: see notes and medication list.        Mental Status Exam:   Hygiene:   good  Cooperation:  Cooperative  Eye Contact:  Good  Psychomotor Behavior:  Appropriate  Affect:  Appropriate  Mood: anxious  Speech:  Normal  Thought Process:  Goal directed and Linear  Thought Content:  Normal  Suicidal:  None  Homicidal:  None  Hallucinations:  None  Delusion:  None  Memory:  Intact and Deficits  Orientation:  Person, Place, Time and Situation  Reliability:  good  Insight:  Good  Judgement:  Good and Fair  Impulse Control:  Good and Fair  Physical/Medical Issues:  Yes hypothyroidism and fibromylgia     Current Medications:   Current Outpatient Medications   Medication Sig Dispense Refill   • acetaminophen-codeine (TYLENOL #3) 300-30 MG per tablet Take 1 tablet by mouth 3 times a day as needed. 46 tablet 0   • vitamin C (ASCORBIC ACID) 500 MG tablet Take 1 tablet by mouth 2 (Two) Times a Day. 60 tablet 0   • chlorzoxazone (PARAFON FORTE) 500 MG tablet Take 1 tablet by mouth three times a day. 90 tablet 0   • cloNIDine (Catapres) 0.2 MG tablet Take 1 tablet by mouth Every Night. 90 tablet 0   • cyclobenzaprine (FLEXERIL) 5 MG tablet Take 1 tablet by mouth 3 times a day (Patient taking differently: As needed) 90 tablet 2   • dexamethasone (DECADRON) 6 MG tablet Take 1 tablet by mouth Daily. 6 tablet 0   • diclofenac (VOLTAREN) 75 MG EC tablet Take 1 tablet by mouth 2 (Two) Times a Day. For  chronic pain and inflammation 60 tablet 2   • Diclofenac Sodium (VOLTAREN) 1 % gel gel Apply 4 gram topically to the appropriate area as directed 4 (Four) Times a Day As Needed. 300 g 2   • DULoxetine (Cymbalta) 60 MG capsule Take 1 capsule by mouth Daily for 90 days. 90 capsule 0   • fluticasone (FLONASE) 50 MCG/ACT nasal spray Spray 1 spray into each nostril every day 16 g 2   • hydrOXYzine (ATARAX) 25 MG tablet Take 1 tablet by mouth 3 times a day as needed 90 tablet 2   • levothyroxine (SYNTHROID, LEVOTHROID) 175 MCG tablet Take 1 tablet by mouth Every Morning. 30 tablet 2   • omeprazole (priLOSEC) 20 MG capsule Take 1 capsule by mouth daily as needed 30 capsule 2   • ondansetron (Zofran) 4 MG tablet Take 1 tablet by mouth 3 (Three) Times a Day. 30 tablet 1   • pregabalin (LYRICA) 150 MG capsule Take 1 capsule by mouth twice daily and 2 capsules by mouth nightly. 90 capsule 1   • RABEprazole (ACIPHEX) 20 MG EC tablet Take 1 tablet by mouth Daily. 30 tablet 0   • silver sulfadiazine (Silvadene) 1 % cream Apply  topically to the appropriate area as directed 2 (Two) Times a Day. For burn treatment 400 g 2   • traZODone (DESYREL) 50 MG tablet Take one-half to one tablet by mouth at night as needed for sleep. 90 tablet 0   • Viloxazine HCl  MG capsule sustained-release 24 hr Take 200 mg by mouth Daily for 7 days, THEN 400 mg Daily for 30 days. 67 capsule 1   • vitamin D3 (vitamin d) 125 MCG (5000 UT) capsule capsule Take 1 capsule by mouth Daily. 30 capsule 2   • Zinc Sulfate 220 (50 Zn) MG tablet Take 220 mg by mouth Daily. 30 each 0     No current facility-administered medications for this visit.       Physical Exam  Nursing note reviewed.   Constitutional:       Appearance: Normal appearance.   Neurological:      Mental Status: She is alert.   Psychiatric:         Attention and Perception: Attention and perception normal. She is attentive.         Mood and Affect: Affect normal. Mood is anxious and  depressed. Affect is not tearful.         Speech: Speech normal.         Behavior: Behavior is cooperative.         Thought Content: Thought content normal.         Cognition and Memory: Cognition normal. She does not exhibit impaired recent memory.         Judgment: Judgment normal.        Result Review :     The following data was reviewed by: KATY Gallagher on 04/01/2021:  Common labs    Common Labsle 4/5/21 4/5/21 9/30/21 9/30/21 12/10/21 12/10/21 12/10/21 12/10/21    1028 1028 1102 1102 1001 1001 1001 1001   Glucose  80 120 (A)    89    BUN  14 20    24 (A)    Creatinine  0.92 0.80    0.83    eGFR Non  Am  66 77    74    Sodium  136 138    139    Potassium  3.7 3.6    3.6    Chloride  99 101    103    Calcium  8.5 (A) 9.1    8.9    Albumin  4.10 4.30    4.00    Total Bilirubin  0.3 0.2    0.2    Alkaline Phosphatase  63 64    71    AST (SGOT)  18 13    14    ALT (SGPT)  14 13    7    WBC    11.59 (A)  10.68     Hemoglobin    13.8  13.9     Hematocrit    42.8  41.5     Platelets    215  227     Total Cholesterol 281 (A)       222 (A)   Triglycerides 259 (A)       119   HDL Cholesterol 52       39 (A)   LDL Cholesterol  180 (A)       161 (A)   Hemoglobin A1C     5.78 (A)      (A) Abnormal value       Comments are available for some flowsheets but are not being displayed.           CMP    CMP 4/5/21 9/30/21 12/10/21   Glucose 80 120 (A) 89   BUN 14 20 24 (A)   Creatinine 0.92 0.80 0.83   eGFR Non African Am 66 77 74   Sodium 136 138 139   Potassium 3.7 3.6 3.6   Chloride 99 101 103   Calcium 8.5 (A) 9.1 8.9   Albumin 4.10 4.30 4.00   Total Bilirubin 0.3 0.2 0.2   Alkaline Phosphatase 63 64 71   AST (SGOT) 18 13 14   ALT (SGPT) 14 13 7   (A) Abnormal value       Comments are available for some flowsheets but are not being displayed.           CBC    CBC 2/17/21 9/30/21 12/10/21   WBC 8.41 11.59 (A) 10.68   RBC 4.95 4.62 4.71   Hemoglobin 14.1 13.8 13.9   Hematocrit 41.5 42.8 41.5   MCV 83.8 92.6 88.1    MCH 28.5 29.9 29.5   MCHC 34.0 32.2 33.5   RDW 14.5 12.5 12.9   Platelets 208 215 227   (A) Abnormal value            CBC w/diff    CBC w/Diff 6/29/20 8/26/20 2/17/21   WBC 8.88 9.6 8.41   RBC 5.22 4.82 4.95   Hemoglobin 14.9 14.2 14.1   Hematocrit 45.7 43.4 41.5   MCV 87.5 90.0 83.8   MCH 28.5 29.5 28.5   MCHC 32.6 32.7 (A) 34.0   RDW 12.9 13.0 14.5   Platelets 255 243 208   Neutrophil Rel % 44.6 52.7 46.8   Immature Granulocyte Rel % 0.2  0.1   Lymphocyte Rel % 47.1 (A) 36.3 42.1   Monocyte Rel % 6.1 9.3 9.0   Eosinophil Rel % 1.5 1 1.3   Basophil Rel % 0.5 0.5 0.7   (A) Abnormal value            Lipid Panel    Lipid Panel 4/5/21 12/10/21   Total Cholesterol 281 (A) 222 (A)   Triglycerides 259 (A) 119   HDL Cholesterol 52 39 (A)   VLDL Cholesterol 49 (A) 22   LDL Cholesterol  180 (A) 161 (A)   LDL/HDL Ratio 3.41 4.08   (A) Abnormal value            TSH    TSH 7/15/21 9/30/21 12/10/21   TSH 0.568 0.098 (A) 6.350 (A)   (A) Abnormal value            HgB    HGB 2/17/21 9/30/21 12/10/21   Hemoglobin 14.1 13.8 13.9           UA    Urinalysis 2/17/21 9/30/21 9/30/21     1102 1102   Squamous Epithelial Cells, UA   7-12 (A)   Specific Hailey, UA 1.012 1.023    Ketones, UA Negative Trace (A)    Blood, UA Negative Negative    Leukocytes, UA Negative Trace (A)    Nitrite, UA Negative Negative    RBC, UA   None Seen   WBC, UA   0-2   Bacteria, UA   4+ (A)   (A) Abnormal value            Data reviewed: PCP notes        Assessment and Plan    Problem List Items Addressed This Visit        Sleep    Sleep difficulties    Relevant Medications    cloNIDine (Catapres) 0.2 MG tablet    traZODone (DESYREL) 50 MG tablet      Other Visit Diagnoses     Mild episode of recurrent major depressive disorder (HCC)    -  Primary    Relevant Medications    hydrOXYzine (ATARAX) 25 MG tablet    traZODone (DESYREL) 50 MG tablet    DULoxetine (Cymbalta) 60 MG capsule    Generalized anxiety disorder        Relevant Medications    hydrOXYzine  (ATARAX) 25 MG tablet    traZODone (DESYREL) 50 MG tablet    DULoxetine (Cymbalta) 60 MG capsule    History of attention deficit hyperactivity disorder (ADHD)        Relevant Medications    cloNIDine (Catapres) 0.2 MG tablet            TREATMENT PLAN/GOALS: Continue supportive psychotherapy efforts and medications as indicated. Treatment and medication options discussed during today's visit. Patient ackowledged and verbally consented to continue with current treatment plan and was educated on the importance of compliance with treatment and follow-up appointments.    MEDICATION ISSUES:  We discussed risks, benefits, and side effects of the above medications and the patient was agreeable with the plan. Patient was educated on the importance of compliance with treatment and follow-up appointments.  Patient is agreeable to call the office with any worsening of symptoms or onset of side effects. Patient is agreeable to call 911 or go to the nearest ER should he/she begin having SI/HI.   We will add Lamictal in an effort to stabilize mood.  The patient was reminded to immediately come to the hospital should there be any loss of control.  Explanation was given to her regarding Lamictal and the potential for Fritz Serafin syndrome and significant rash.  Patient was encouraged to check skin prior to beginning.  Patient was encouraged to report any rash and to immediately stop medication.      -Continue Qelbree 400mg daily.  Patient has now failed and tried Adderall, Adderall XR, Focalin, Concerta, Vyvanse as well as Strattera and bupropion without any benefits and side effects.  -Continue clonidine 0.2 mg at night for focus and attention.   -Continue Cymbalta 60 mg daily for depression and anxiety.  -Continue 25-50mg of Trazodone at night prn for sleep.   -Continue hydroxyzine 25 mg 3 times daily as needed for anxiety         Counseled patient regarding multimodal approach with healthy nutrition, healthy sleep, regular  physical activity, social activities, counseling, and medications.      Coping skills reviewed and encouraged positive framing of thoughts     Assisted patient in processing above session content; acknowledged and normalized patient’s thoughts, feelings, and concerns.  Applied  positive coping skills and behavior management in session.  Allowed patient to freely discuss issues without interruption or judgment. Provided safe, confidential environment to facilitate the development of positive therapeutic relationship and encourage open, honest communication. Assisted patient in identifying risk factors which would indicate the need for higher level of care including thoughts to harm self or others and/or self-harming behavior and encouraged patient to contact this office, call 911, or present to the nearest emergency room should any of these events occur. Discussed crisis intervention services and means to access.     MEDS ORDERED DURING VISIT:  New Medications Ordered This Visit   Medications   • cloNIDine (Catapres) 0.2 MG tablet     Sig: Take 1 tablet by mouth Every Night.     Dispense:  90 tablet     Refill:  0   • hydrOXYzine (ATARAX) 25 MG tablet     Sig: Take 1 tablet by mouth 3 times a day as needed     Dispense:  90 tablet     Refill:  2   • traZODone (DESYREL) 50 MG tablet     Sig: Take one-half to one tablet by mouth at night as needed for sleep.     Dispense:  90 tablet     Refill:  0   • DULoxetine (Cymbalta) 60 MG capsule     Sig: Take 1 capsule by mouth Daily for 90 days.     Dispense:  90 capsule     Refill:  0           Follow Up   Return in about 8 weeks (around 4/6/2022), or if symptoms worsen or fail to improve, for Recheck.    Patient was given instructions and counseling regarding her condition or for health maintenance advice. Please see specific information pulled into the AVS if appropriate.     Some of the data in this electronic note has been brought forward from a previous encounter, any  necessary changes have been made, it has been reviewed by this APRN, and it is accurate.      This document has been electronically signed by KATY Gallagher  February 9, 2022 09:43 EST    Part of this note may be an electronic transcription/translation of spoken language to printed text using the Dragon Dictation System.

## 2022-02-10 ENCOUNTER — OFFICE VISIT (OUTPATIENT)
Dept: OBSTETRICS AND GYNECOLOGY | Facility: CLINIC | Age: 47
End: 2022-02-10

## 2022-02-10 VITALS
HEIGHT: 65 IN | DIASTOLIC BLOOD PRESSURE: 72 MMHG | BODY MASS INDEX: 31.49 KG/M2 | SYSTOLIC BLOOD PRESSURE: 108 MMHG | WEIGHT: 189 LBS

## 2022-02-10 DIAGNOSIS — R87.610 ATYPICAL SQUAMOUS CELL CHANGES OF UNDETERMINED SIGNIFICANCE (ASCUS) ON CERVICAL CYTOLOGY WITH POSITIVE HIGH RISK HUMAN PAPILLOMA VIRUS (HPV): Primary | ICD-10-CM

## 2022-02-10 DIAGNOSIS — R87.810 ATYPICAL SQUAMOUS CELL CHANGES OF UNDETERMINED SIGNIFICANCE (ASCUS) ON CERVICAL CYTOLOGY WITH POSITIVE HIGH RISK HUMAN PAPILLOMA VIRUS (HPV): Primary | ICD-10-CM

## 2022-02-10 PROCEDURE — 57456 ENDOCERV CURETTAGE W/SCOPE: CPT | Performed by: OBSTETRICS & GYNECOLOGY

## 2022-02-10 PROCEDURE — 88305 TISSUE EXAM BY PATHOLOGIST: CPT | Performed by: OBSTETRICS & GYNECOLOGY

## 2022-02-10 NOTE — PROGRESS NOTES
"Kimberly Morgan is a 46 y.o. female  here today for colposcopy.  Her most recent Pap smear was read as ASCUS with positive HPV.  She has had bilateral salpingectomy.    /72 (BP Location: Right arm, Patient Position: Sitting, Cuff Size: Adult)   Ht 165.1 cm (65\")   Wt 85.7 kg (189 lb)   LMP 01/24/2022 (Exact Date)   Breastfeeding No   BMI 31.45 kg/m²      Colposcopy was performed in the office today.  She was placed in the lithotomy position on the exam table.  A speculum was inserted and the cervix well visualized.  The cervix was cleansed with acetic acid swabs.  Inspection with the colposcope showed the transformation zone in the ectocervix.  It was not seen in its entirety.  There were no acetowhite lesions noted.  Colposcopy was unsatisfactory. No biopsy was performed.  An endocervical curettage was performed.  She tolerated the procedure well.    Colposcopic impression: Normal inadequate colposcopy    We will notify her when the pathology report is available to discuss further care.  We have discussed post colposcopy instructions.   "

## 2022-02-11 DIAGNOSIS — M54.2 NECK PAIN, CHRONIC: ICD-10-CM

## 2022-02-11 DIAGNOSIS — M79.7 FIBROMYALGIA: ICD-10-CM

## 2022-02-11 DIAGNOSIS — G89.29 NECK PAIN, CHRONIC: ICD-10-CM

## 2022-02-11 RX ORDER — DICLOFENAC SODIUM 75 MG/1
75 TABLET, DELAYED RELEASE ORAL 2 TIMES DAILY
Qty: 60 TABLET | Refills: 2 | Status: CANCELLED | OUTPATIENT
Start: 2022-02-11

## 2022-02-11 NOTE — TELEPHONE ENCOUNTER
Rx Refill Note  Requested Prescriptions     Pending Prescriptions Disp Refills   • diclofenac (VOLTAREN) 75 MG EC tablet 60 tablet 2     Sig: Take 1 tablet by mouth 2 (Two) Times a Day. For chronic pain and inflammation      Last office visit with prescribing clinician: 11/11/2021      Next office visit with prescribing clinician: Visit date not found            Jazlyn Singh MA  02/11/22, 13:42 CST

## 2022-02-15 DIAGNOSIS — M54.2 NECK PAIN, CHRONIC: ICD-10-CM

## 2022-02-15 DIAGNOSIS — M79.7 FIBROMYALGIA: ICD-10-CM

## 2022-02-15 DIAGNOSIS — G89.29 NECK PAIN, CHRONIC: ICD-10-CM

## 2022-02-15 LAB
CYTO UR: NORMAL
LAB AP CASE REPORT: NORMAL
LAB AP CLINICAL INFORMATION: NORMAL
PATH REPORT.FINAL DX SPEC: NORMAL
PATH REPORT.GROSS SPEC: NORMAL

## 2022-02-15 RX ORDER — DICLOFENAC SODIUM 75 MG/1
75 TABLET, DELAYED RELEASE ORAL 2 TIMES DAILY
Qty: 60 TABLET | Refills: 2 | Status: SHIPPED | OUTPATIENT
Start: 2022-02-15 | End: 2022-05-23 | Stop reason: SDUPTHER

## 2022-02-15 NOTE — TELEPHONE ENCOUNTER
Rx Refill Note  Requested Prescriptions     Pending Prescriptions Disp Refills   • diclofenac (VOLTAREN) 75 MG EC tablet [Pharmacy Med Name: Diclofenac Sodium 75 MG Oral Tablet Delayed Release (VOLTAREN)] 60 tablet 2     Sig: Take 1 tablet by mouth 2 (Two) Times a Day. For chronic pain and inflammation      Last office visit with prescribing clinician: 11/11/2021      Next office visit with prescribing clinician: Visit date not found            HILDA Starr  02/15/22, 12:10 CST       Patient's PCP updated to KATY Kenny on 12/10/21

## 2022-02-21 DIAGNOSIS — Z86.59 HISTORY OF ATTENTION DEFICIT HYPERACTIVITY DISORDER (ADHD): ICD-10-CM

## 2022-02-21 RX ORDER — VILOXAZINE HYDROCHLORIDE 200 MG/1
400 CAPSULE, EXTENDED RELEASE ORAL DAILY
Qty: 60 CAPSULE | Refills: 0 | Status: SHIPPED | OUTPATIENT
Start: 2022-02-21 | End: 2022-04-02 | Stop reason: SDUPTHER

## 2022-03-10 ENCOUNTER — TRANSCRIBE ORDERS (OUTPATIENT)
Dept: ADMINISTRATIVE | Facility: HOSPITAL | Age: 47
End: 2022-03-10

## 2022-03-10 ENCOUNTER — LAB (OUTPATIENT)
Dept: LAB | Facility: HOSPITAL | Age: 47
End: 2022-03-10

## 2022-03-10 DIAGNOSIS — E03.9 HYPOTHYROIDISM, UNSPECIFIED TYPE: Primary | ICD-10-CM

## 2022-03-10 DIAGNOSIS — E03.9 HYPOTHYROIDISM, UNSPECIFIED TYPE: ICD-10-CM

## 2022-03-10 LAB
T3FREE SERPL-MCNC: 2.5 PG/ML (ref 2–4.4)
T4 FREE SERPL-MCNC: 1.46 NG/DL (ref 0.93–1.7)
TSH SERPL DL<=0.05 MIU/L-ACNC: 9.76 UIU/ML (ref 0.27–4.2)

## 2022-03-10 PROCEDURE — 36415 COLL VENOUS BLD VENIPUNCTURE: CPT

## 2022-03-10 PROCEDURE — 84439 ASSAY OF FREE THYROXINE: CPT

## 2022-03-10 PROCEDURE — 84443 ASSAY THYROID STIM HORMONE: CPT

## 2022-03-10 PROCEDURE — 84481 FREE ASSAY (FT-3): CPT

## 2022-03-10 PROCEDURE — 84482 T3 REVERSE: CPT

## 2022-03-17 LAB — T3REVERSE SERPL-MCNC: 19.5 NG/DL (ref 9.2–24.1)

## 2022-04-02 DIAGNOSIS — F33.0 MILD EPISODE OF RECURRENT MAJOR DEPRESSIVE DISORDER: ICD-10-CM

## 2022-04-02 DIAGNOSIS — G47.9 SLEEP DIFFICULTIES: ICD-10-CM

## 2022-04-02 DIAGNOSIS — Z86.59 HISTORY OF ATTENTION DEFICIT HYPERACTIVITY DISORDER (ADHD): ICD-10-CM

## 2022-04-02 DIAGNOSIS — F41.1 GENERALIZED ANXIETY DISORDER: ICD-10-CM

## 2022-04-04 RX ORDER — ONDANSETRON 4 MG/1
4 TABLET, FILM COATED ORAL 3 TIMES DAILY
Qty: 30 TABLET | Refills: 1 | OUTPATIENT
Start: 2022-04-04

## 2022-04-04 RX ORDER — CLONIDINE HYDROCHLORIDE 0.2 MG/1
0.2 TABLET ORAL NIGHTLY
Qty: 90 TABLET | Refills: 0 | Status: SHIPPED | OUTPATIENT
Start: 2022-04-04

## 2022-04-04 RX ORDER — DULOXETIN HYDROCHLORIDE 60 MG/1
60 CAPSULE, DELAYED RELEASE ORAL DAILY
Qty: 90 CAPSULE | Refills: 0 | Status: SHIPPED | OUTPATIENT
Start: 2022-04-04 | End: 2022-07-03

## 2022-04-04 RX ORDER — VILOXAZINE HYDROCHLORIDE 200 MG/1
400 CAPSULE, EXTENDED RELEASE ORAL DAILY
Qty: 60 CAPSULE | Refills: 0 | Status: SHIPPED | OUTPATIENT
Start: 2022-04-04 | End: 2022-04-07 | Stop reason: SDUPTHER

## 2022-04-07 ENCOUNTER — HOSPITAL ENCOUNTER (OUTPATIENT)
Dept: GENERAL RADIOLOGY | Facility: HOSPITAL | Age: 47
Discharge: HOME | End: 2022-04-07

## 2022-04-07 ENCOUNTER — TRANSCRIBE ORDERS (OUTPATIENT)
Dept: ADMINISTRATIVE | Facility: HOSPITAL | Age: 47
End: 2022-04-07

## 2022-04-07 DIAGNOSIS — M25.511 RIGHT SHOULDER PAIN, UNSPECIFIED CHRONICITY: ICD-10-CM

## 2022-04-07 DIAGNOSIS — M54.2 CERVICALGIA: ICD-10-CM

## 2022-04-07 DIAGNOSIS — M25.511 RIGHT SHOULDER PAIN, UNSPECIFIED CHRONICITY: Primary | ICD-10-CM

## 2022-04-07 PROCEDURE — 72040 X-RAY EXAM NECK SPINE 2-3 VW: CPT

## 2022-04-07 PROCEDURE — 73030 X-RAY EXAM OF SHOULDER: CPT

## 2022-06-03 ENCOUNTER — LAB (OUTPATIENT)
Dept: LAB | Facility: HOSPITAL | Age: 47
End: 2022-06-03

## 2022-06-03 ENCOUNTER — TRANSCRIBE ORDERS (OUTPATIENT)
Dept: ADMINISTRATIVE | Facility: HOSPITAL | Age: 47
End: 2022-06-03

## 2022-06-03 DIAGNOSIS — E55.9 VITAMIN D DEFICIENCY, UNSPECIFIED: ICD-10-CM

## 2022-06-03 DIAGNOSIS — R53.83 OTHER FATIGUE: Primary | ICD-10-CM

## 2022-06-03 DIAGNOSIS — E61.1 IRON DEFICIENCY: ICD-10-CM

## 2022-06-03 DIAGNOSIS — E03.9 HYPOTHYROIDISM, UNSPECIFIED TYPE: ICD-10-CM
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
## 2022-06-03 DIAGNOSIS — E53.8 DEFICIENCY OF OTHER SPECIFIED B GROUP VITAMINS: ICD-10-CM

## 2022-06-03 LAB
ALBUMIN SERPL-MCNC: 4 G/DL (ref 3.5–5)
ALBUMIN/GLOB SERPL: 1.2 G/DL (ref 1.1–2.5)
ALP SERPL-CCNC: 78 U/L (ref 24–120)
ALT SERPL W P-5'-P-CCNC: 13 U/L (ref 0–35)
ANION GAP SERPL CALCULATED.3IONS-SCNC: 1 MMOL/L (ref 4–13)
AST SERPL-CCNC: 18 U/L (ref 7–45)
AUTO MIXED CELLS #: 0.7 10*3/MM3 (ref 0.1–2.6)
AUTO MIXED CELLS %: 9.3 % (ref 0.1–24)
BILIRUB SERPL-MCNC: 0.3 MG/DL (ref 0.1–1)
BUN SERPL-MCNC: 20 MG/DL (ref 5–21)
BUN/CREAT SERPL: 27.4
CALCIUM SPEC-SCNC: 9.7 MG/DL (ref 8.4–10.4)
CHLORIDE SERPL-SCNC: 108 MMOL/L (ref 98–110)
CO2 SERPL-SCNC: 28 MMOL/L (ref 24–31)
CREAT SERPL-MCNC: 0.73 MG/DL (ref 0.5–1.4)
EGFRCR SERPLBLD CKD-EPI 2021: 102.9 ML/MIN/1.73
ERYTHROCYTE [DISTWIDTH] IN BLOOD BY AUTOMATED COUNT: 12.9 % (ref 12.3–15.4)
GLOBULIN UR ELPH-MCNC: 3.4 GM/DL
GLUCOSE SERPL-MCNC: 95 MG/DL (ref 70–100)
HBA1C MFR BLD: 5.6 % (ref 4.8–5.9)
HCT VFR BLD AUTO: 40.2 % (ref 34–46.6)
HGB BLD-MCNC: 13.6 G/DL (ref 12–15.9)
LYMPHOCYTES # BLD AUTO: 3.2 10*3/MM3 (ref 0.7–3.1)
LYMPHOCYTES NFR BLD AUTO: 40.9 % (ref 19.6–45.3)
MCH RBC QN AUTO: 30 PG (ref 26.6–33)
MCHC RBC AUTO-ENTMCNC: 33.8 G/DL (ref 31.5–35.7)
MCV RBC AUTO: 88.7 FL (ref 79–97)
NEUTROPHILS NFR BLD AUTO: 4 10*3/MM3 (ref 1.7–7)
NEUTROPHILS NFR BLD AUTO: 49.8 % (ref 42.7–76)
PLATELET # BLD AUTO: 211 10*3/MM3 (ref 140–450)
PMV BLD AUTO: 10 FL (ref 6–12)
POTASSIUM SERPL-SCNC: 4.1 MMOL/L (ref 3.5–5.3)
PROT SERPL-MCNC: 7.4 G/DL (ref 6.3–8.7)
RBC # BLD AUTO: 4.53 10*6/MM3 (ref 3.77–5.28)
SODIUM SERPL-SCNC: 137 MMOL/L (ref 135–145)
WBC NRBC COR # BLD: 7.9 10*3/MM3 (ref 3.4–10.8)

## 2022-06-03 PROCEDURE — 82746 ASSAY OF FOLIC ACID SERUM: CPT | Performed by: NURSE PRACTITIONER

## 2022-06-03 PROCEDURE — 82306 VITAMIN D 25 HYDROXY: CPT | Performed by: NURSE PRACTITIONER

## 2022-06-03 PROCEDURE — 82728 ASSAY OF FERRITIN: CPT | Performed by: NURSE PRACTITIONER

## 2022-06-03 PROCEDURE — 86665 EPSTEIN-BARR CAPSID VCA: CPT | Performed by: NURSE PRACTITIONER

## 2022-06-03 PROCEDURE — 82607 VITAMIN B-12: CPT | Performed by: NURSE PRACTITIONER

## 2022-06-03 PROCEDURE — 86666 EHRLICHIA ANTIBODY: CPT | Performed by: NURSE PRACTITIONER

## 2022-06-03 PROCEDURE — 36415 COLL VENOUS BLD VENIPUNCTURE: CPT | Performed by: NURSE PRACTITIONER

## 2022-06-03 PROCEDURE — 86664 EPSTEIN-BARR NUCLEAR ANTIGEN: CPT | Performed by: NURSE PRACTITIONER

## 2022-06-03 PROCEDURE — 84466 ASSAY OF TRANSFERRIN: CPT | Performed by: NURSE PRACTITIONER

## 2022-06-03 PROCEDURE — 83036 HEMOGLOBIN GLYCOSYLATED A1C: CPT | Performed by: NURSE PRACTITIONER

## 2022-06-03 PROCEDURE — 86618 LYME DISEASE ANTIBODY: CPT | Performed by: NURSE PRACTITIONER

## 2022-06-03 PROCEDURE — 84439 ASSAY OF FREE THYROXINE: CPT | Performed by: NURSE PRACTITIONER

## 2022-06-03 PROCEDURE — 80050 GENERAL HEALTH PANEL: CPT | Performed by: NURSE PRACTITIONER

## 2022-06-03 PROCEDURE — 84482 T3 REVERSE: CPT | Performed by: NURSE PRACTITIONER

## 2022-06-03 PROCEDURE — 83540 ASSAY OF IRON: CPT | Performed by: NURSE PRACTITIONER

## 2022-06-03 PROCEDURE — 84481 FREE ASSAY (FT-3): CPT | Performed by: NURSE PRACTITIONER

## 2022-06-04 LAB
25(OH)D3 SERPL-MCNC: 33 NG/ML (ref 30–100)
B BURGDOR IGG+IGM SER QL IA: NEGATIVE
EBV NA IGG SER IA-ACNC: >600 U/ML (ref 0–17.9)
EBV VCA IGG SER IA-ACNC: 510 U/ML (ref 0–17.9)
EBV VCA IGM SER IA-ACNC: <36 U/ML (ref 0–35.9)
FERRITIN SERPL-MCNC: 92.9 NG/ML (ref 13–150)
FOLATE SERPL-MCNC: 6.5 NG/ML (ref 4.78–24.2)
IRON 24H UR-MRATE: 49 MCG/DL (ref 37–145)
IRON SATN MFR SERPL: 13 % (ref 20–50)
SERVICE CMNT-IMP: ABNORMAL
T3FREE SERPL-MCNC: 3.11 PG/ML (ref 2–4.4)
T4 FREE SERPL-MCNC: 2.29 NG/DL (ref 0.93–1.7)
TIBC SERPL-MCNC: 374 MCG/DL (ref 298–536)
TRANSFERRIN SERPL-MCNC: 251 MG/DL (ref 200–360)
TSH SERPL DL<=0.05 MIU/L-ACNC: 0.06 UIU/ML (ref 0.27–4.2)
VIT B12 BLD-MCNC: 428 PG/ML (ref 211–946)

## 2022-06-09 LAB
A PHAGOCYTOPH IGG TITR SER IF: NEGATIVE {TITER}
A PHAGOCYTOPH IGM TITR SER IF: NEGATIVE {TITER}
E CHAFFEENSIS IGG TITR SER IF: NEGATIVE {TITER}
E CHAFFEENSIS IGM TITR SER IF: NEGATIVE {TITER}

## 2022-06-15 LAB — T3REVERSE SERPL-MCNC: 28.2 NG/DL (ref 9.2–24.1)

## 2022-07-29 ENCOUNTER — TRANSCRIBE ORDERS (OUTPATIENT)
Dept: ADMINISTRATIVE | Facility: HOSPITAL | Age: 47
End: 2022-07-29

## 2022-07-29 DIAGNOSIS — M50.30 DEGENERATION OF CERVICAL INTERVERTEBRAL DISC: Primary | ICD-10-CM

## 2022-08-16 ENCOUNTER — HOSPITAL ENCOUNTER (OUTPATIENT)
Dept: MRI IMAGING | Facility: HOSPITAL | Age: 47
Discharge: HOME | End: 2022-08-16
Admitting: NURSE PRACTITIONER

## 2022-08-16 DIAGNOSIS — M50.30 DEGENERATION OF CERVICAL INTERVERTEBRAL DISC: ICD-10-CM

## 2022-08-16 PROCEDURE — 72141 MRI NECK SPINE W/O DYE: CPT

## 2023-02-21 ENCOUNTER — TELEPHONE (OUTPATIENT)
Dept: NEUROSURGERY | Age: 48
End: 2023-02-21

## 2023-02-21 ENCOUNTER — TRANSCRIBE ORDERS (OUTPATIENT)
Dept: ADMINISTRATIVE | Facility: HOSPITAL | Age: 48
End: 2023-02-21
Payer: COMMERCIAL

## 2023-02-21 ENCOUNTER — HOSPITAL ENCOUNTER (OUTPATIENT)
Dept: GENERAL RADIOLOGY | Facility: HOSPITAL | Age: 48
Discharge: HOME | End: 2023-02-21
Admitting: NURSE PRACTITIONER
Payer: COMMERCIAL

## 2023-02-21 DIAGNOSIS — M54.16 LUMBAR RADICULOPATHY: Primary | ICD-10-CM

## 2023-02-21 DIAGNOSIS — M54.16 LUMBAR RADICULOPATHY: ICD-10-CM

## 2023-02-21 PROCEDURE — 72110 X-RAY EXAM L-2 SPINE 4/>VWS: CPT

## 2023-02-21 NOTE — TELEPHONE ENCOUNTER
1st attempt to contact patient.  I left a voicemail instructing patient to call back at [phone] to schedule their new patient appointment     Radiculopathy, cervical region    MRI CERVICAL 8/16/22 [facility]   XR LUMBAR 2/21/23 [facility]

## 2023-02-23 NOTE — TELEPHONE ENCOUNTER
[facility] New Patient Questionnaire    Diagnosis/Reason for Referral?    Radiculopathy, cervical region    2. Who is completing questionnaire? Patient  Caregiver Family      3. Has the patient had any previous spinal/brain surgeries? NO      A. If yes, what is the name of the facility in which the surgery was performed? B. Procedure/Surgery performed? C. Who was the surgeon? D. When was the surgery? MM/YY       E. Did the patient improve after the surgery? 4. Is this a second opinion? If yes, Dr. Isai Corral would like to review patient first before making the appointment. NO    5. Have MRI Images been obtain within the last year? Yes  No      XR  CT     If yes, where was the imaging performed? [facility]   If yes, what part of the body? Lumbar  Cervical  Thoracic  Brain     If yes, when was it obtained? 8/16/22, 2/21/22    Note: if the scan was performed at a facility other than [facility], the disc will need to be brought to the appointment or we need to reach out to obtain the disc. A. Was the patient instructed to provide the disc? Yes   No      8. Has the patient had a NCV/EMG within the last year? Yes  No     If yes, where was it performed and date? MM/YY  Location:      9. Has the patient been to Physical Therapy? Yes  No     If yes, what location, how long attended, and last visit? Location:        Therapy Lasted:    Date of Last Visit:      10. Has the patient been to Pain Management? Yes  No     If yes, what location and last visit     Location:   Last Visit:   Is it helping?

## 2023-03-03 ENCOUNTER — OFFICE VISIT (OUTPATIENT)
Dept: NEUROSURGERY | Age: 48
End: 2023-03-03
Payer: MEDICAID

## 2023-03-03 VITALS
SYSTOLIC BLOOD PRESSURE: 108 MMHG | BODY MASS INDEX: 31.65 KG/M2 | HEIGHT: 65 IN | HEART RATE: 78 BPM | DIASTOLIC BLOOD PRESSURE: 72 MMHG | OXYGEN SATURATION: 98 % | RESPIRATION RATE: 18 BRPM | WEIGHT: 190 LBS

## 2023-03-03 DIAGNOSIS — M50.30 DDD (DEGENERATIVE DISC DISEASE), CERVICAL: ICD-10-CM

## 2023-03-03 DIAGNOSIS — Z01.811 PRE-OP CHEST EXAM: ICD-10-CM

## 2023-03-03 DIAGNOSIS — M47.22 CERVICAL SPONDYLOSIS WITH RADICULOPATHY: Primary | ICD-10-CM

## 2023-03-03 PROBLEM — M47.12 CERVICAL SPONDYLOSIS WITH MYELOPATHY: Status: ACTIVE | Noted: 2023-03-03

## 2023-03-03 PROCEDURE — 99205 OFFICE O/P NEW HI 60 MIN: CPT | Performed by: NEUROLOGICAL SURGERY

## 2023-03-03 RX ORDER — MELOXICAM 15 MG/1
15 TABLET ORAL DAILY
COMMUNITY
Start: 2023-01-26

## 2023-03-03 RX ORDER — AZELAIC ACID 0.15 G/G
GEL TOPICAL SEE ADMIN INSTRUCTIONS
COMMUNITY

## 2023-03-03 RX ORDER — SODIUM CHLORIDE 9 MG/ML
INJECTION, SOLUTION INTRAVENOUS PRN
OUTPATIENT
Start: 2023-03-03

## 2023-03-03 RX ORDER — LEVOTHYROXINE SODIUM 0.1 MG/1
100 TABLET ORAL DAILY
COMMUNITY
Start: 2023-02-14

## 2023-03-03 RX ORDER — ACETAMINOPHEN 325 MG/1
1000 TABLET ORAL ONCE
OUTPATIENT
Start: 2023-03-03 | End: 2023-03-03

## 2023-03-03 RX ORDER — ONDANSETRON 4 MG/1
4 TABLET, FILM COATED ORAL EVERY 8 HOURS PRN
COMMUNITY

## 2023-03-03 RX ORDER — CLONIDINE HYDROCHLORIDE 0.1 MG/1
0.1 TABLET ORAL DAILY
COMMUNITY
Start: 2023-03-01

## 2023-03-03 RX ORDER — RABEPRAZOLE SODIUM 20 MG/1
20 TABLET, DELAYED RELEASE ORAL DAILY
COMMUNITY
Start: 2023-01-09

## 2023-03-03 RX ORDER — HYDROXYZINE HYDROCHLORIDE 25 MG/1
25 TABLET, FILM COATED ORAL DAILY
COMMUNITY
Start: 2022-12-06

## 2023-03-03 RX ORDER — HYDROXYCHLOROQUINE SULFATE 200 MG/1
200 TABLET, FILM COATED ORAL DAILY
COMMUNITY
Start: 2023-01-11

## 2023-03-03 RX ORDER — SODIUM CHLORIDE 0.9 % (FLUSH) 0.9 %
5-40 SYRINGE (ML) INJECTION PRN
OUTPATIENT
Start: 2023-03-03

## 2023-03-03 RX ORDER — METHYLPHENIDATE HYDROCHLORIDE 54 MG/1
54 TABLET ORAL EVERY MORNING
COMMUNITY

## 2023-03-03 RX ORDER — FLUTICASONE PROPIONATE 50 MCG
SPRAY, SUSPENSION (ML) NASAL
COMMUNITY
Start: 2021-12-01

## 2023-03-03 RX ORDER — LEVOCETIRIZINE DIHYDROCHLORIDE 5 MG/1
5 TABLET, FILM COATED ORAL NIGHTLY
COMMUNITY

## 2023-03-03 RX ORDER — ERGOCALCIFEROL 1.25 MG/1
50000 CAPSULE ORAL WEEKLY
COMMUNITY
Start: 2023-02-14

## 2023-03-03 RX ORDER — DULOXETIN HYDROCHLORIDE 60 MG/1
60 CAPSULE, DELAYED RELEASE ORAL 2 TIMES DAILY
COMMUNITY
Start: 2023-03-01

## 2023-03-03 RX ORDER — SODIUM CHLORIDE 0.9 % (FLUSH) 0.9 %
5-40 SYRINGE (ML) INJECTION EVERY 12 HOURS SCHEDULED
OUTPATIENT
Start: 2023-03-03

## 2023-03-03 ASSESSMENT — ENCOUNTER SYMPTOMS
RESPIRATORY NEGATIVE: 1
GASTROINTESTINAL NEGATIVE: 1
EYES NEGATIVE: 1

## 2023-03-03 NOTE — H&P
[facility]  H&P      Chief Complaint   Patient presents with    New Patient     Establishing care     Results     Imaging in Nucleus     Neck Pain     Patient states she has had pain for awhile and noticed it worsening in the past year. She states the pain does radiate into her BUE. She is taking Meloxicam and also receives epidural injections at [facility]. Numbness     Patient states she does have numbness/tingling in her BUE. HISTORY OF PRESENT ILLNESS:    Ted Dennison is a 52 y.o. female who is currently unemployed who presents with neck and bilateral shoulder pain for over 15 years that has significantly worsened over the last year. The pain does radiate into posterolateral arm into the forearm and hand on the left. Her pain is mostly located in the neck and shoulders. The patient complains of numbness of the left arm. She does have numbness in the fingertips on the left, trouble using hands to perform fine motor tasks. She states she is dropping objects. The patient has underwent an stiff non-operative treatment course that has included:  NSAIDs - Ibuprofen and others  Muscle Relaxers Flexeril  Opiates   Gabapentin  Oral Steroids  Physical Therapy   Epidural Steroid Injections by Dr. Niurka Shah note she does use tobacco and does not take blood thinning medications.                Past Medical History:   Diagnosis Date    Depression     Fibromyalgia     Hx of Graves' disease     Thyroid disease        Past Surgical History:   Procedure Laterality Date    CARPAL TUNNEL RELEASE       SECTION      CHOLECYSTECTOMY      DILATION AND CURETTAGE OF UTERUS      x 2    HC INJECTION PROCEDURE FOR SACROILIAC JOINT Right 2017    [facility] Right 2019    SACROILIAC JOINT INJECTION performed by SYSCO Deondre at [facility] OR    LITHOTRIPSY      OTHER SURGICAL HISTORY      Falloipan tube removal    TONSILLECTOMY      WISDOM TOOTH EXTRACTION         Current Outpatient Medications   Medication Sig Dispense Refill    DULoxetine (CYMBALTA) 60 MG extended release capsule Take 60 mg by mouth in the morning and at bedtime      vitamin D (ERGOCALCIFEROL) 1.25 MG (15048 UT) CAPS capsule Take 50,000 Units by mouth once a week      fluticasone (FLONASE) 50 MCG/ACT nasal spray Spray 1 spray into each nostril every day      hydroxychloroquine (PLAQUENIL) 200 MG tablet Take 200 mg by mouth daily      hydrOXYzine HCl (ATARAX) 25 MG tablet Take 25 mg by mouth daily      levothyroxine (SYNTHROID) 100 MCG tablet Take 100 mcg by mouth daily      meloxicam (MOBIC) 15 MG tablet Take 15 mg by mouth daily      RABEprazole (ACIPHEX) 20 MG tablet Take 20 mg by mouth daily      cloNIDine (CATAPRES) 0.1 MG tablet Take 0.1 mg by mouth daily      Azelaic Acid 15 % GEL Apply topically See Admin Instructions      ASHWAGANDHA PO Take by mouth      diclofenac sodium (VOLTAREN) 1 % GEL Apply topically 2 times daily      methylphenidate (CONCERTA) 54 MG extended release tablet Take 54 mg by mouth every morning. levocetirizine (XYZAL) 5 MG tablet Take 5 mg by mouth nightly      ondansetron (ZOFRAN) 4 MG tablet Take 4 mg by mouth every 8 hours as needed for Nausea or Vomiting      Naproxen Sodium (ALEVE PO) Take by mouth      amphetamine-dextroamphetamine (ADDERALL) 10 MG tablet Take 1 tablet by mouth daily for 30 days. 30 tablet 0    clonazePAM (KLONOPIN) 0.5 MG tablet Take 1 tablet by mouth nightly as needed for Anxiety for up to 30 days.  30 tablet 0    levothyroxine (SYNTHROID) 137 MCG tablet Take 1 tablet by mouth daily (Patient not taking: Reported on 3/3/2023) 90 tablet 1    omeprazole (PRILOSEC) 10 MG delayed release capsule Take 1 capsule by mouth daily 30 capsule 3    PARoxetine (PAXIL) 10 MG tablet Take 1 tablet by mouth every morning 30 tablet 5    atomoxetine (STRATTERA) 100 MG capsule Take 100 mg by mouth daily      liothyronine (CYTOMEL) 5 MCG tablet Take 5 mcg by mouth daily      pregabalin (LYRICA) 75 MG capsule Take 75 mg by mouth 3 times daily. cyclobenzaprine (FLEXERIL) 10 MG tablet Take 10 mg by mouth 3 times daily as needed       No current facility-administered medications for this visit. Allergies:  Patient has no known allergies. Social History:   Social History     Tobacco Use   Smoking Status Every Day    Packs/day: 0.50    Types: Cigarettes    Start date: 1992   Smokeless Tobacco Never     Social History     Substance and Sexual Activity   Alcohol Use Yes    Comment: rarely         Family History:   Family History   Problem Relation Age of Onset    Other Father         Heart stents       REVIEW OF SYSTEMS:  Constitutional: Negative. HENT: Negative. Eyes: Negative. Respiratory: Negative. Cardiovascular: Negative. Gastrointestinal: Negative. Genitourinary: Negative. Musculoskeletal:  Positive for joint pain, myalgias and neck pain. Skin: Negative. Neurological:  Positive for dizziness, tingling and headaches. Endo/Heme/Allergies: Negative. Psychiatric/Behavioral: Negative. PHYSICAL EXAM:  Vitals:    03/03/23 0857   BP: 108/72   Pulse: 78   Resp: 18   SpO2: 98%     Constitutional: appears well-developed and well-nourished. Eyes - conjunctiva normal.  Pupils react to light  Ear, nose, throat - hearing intact to finger rub, No scars, masses, or lesions over external nose or ears, no atrophy oftongue  Neck- symmetric, no masses noted, no jugular vein distension  Respiration- chest wall appears symmetric, good expansion, normal effort without use of accessory muscles  Musculoskeletal - no significant wasting of muscles noted, no bony deformities, gait no gross ataxia  Extremities- no clubbing, cyanosis oredema  Skin - warm, dry, and intact.   No rash, erythema, or pallor. Psychiatric - mood, affect, and behavior appear normal.     Neurologic Examination  Awake, Alert and oriented x 4  Normal speech pattern, following commands    Motor:Her strength is difficult to assess due to pain limitations but she is at least 4/5 in all groups  RIGHT: hand grasp 4/5    finger extension 4/5    bicep 4/5    triceps 4/5    deltoid 4/5      LEFT:   hand grasp 4/5    finger extension 4/5    bicep 4/5    triceps 4/5    deltoid 4/5    No deficits to light touch or pinprick sensation  Reflexes are 2+ and symmetric  Babinski sign was downgoing bilaterally  No clonus or Hoffmans sign    DATA and IMAGING:        Lab Results   Component Value Date    WBC 9.6 08/26/2020    HGB 14.2 08/26/2020    HCT 43.4 08/26/2020    MCV 90.0 08/26/2020     08/26/2020     Lab Results   Component Value Date     08/26/2020    K 3.8 08/26/2020     08/26/2020    CO2 22 08/26/2020    BUN 13 08/26/2020    CREATININE 0.6 08/26/2020    GLUCOSE 97 08/26/2020    CALCIUM 9.3 08/26/2020    PROT 7.4 08/26/2020    LABALBU 4.1 08/26/2020    BILITOT 0.3 08/26/2020    ALKPHOS 72 08/26/2020    AST 11 08/26/2020    ALT 10 08/26/2020    LABGLOM >60 08/26/2020    GFRAA >59 08/26/2020     Lab Results   Component Value Date    INR 0.92 10/20/2014    PROTIME 12.1 10/20/2014     MRI Cervical spine, [facility] 8/16/22  I have personally reviewed these images and my interpretation is: At C4-5 there is mild to moderate right foraminal stenosis  At C5-6 there is mild right foraminal stenosis    At C6-7 there is a large disc-osteophyte complex resulting in severe right foraminal stenosis and compression of the right C7 nerve root. Re is also mild canal stenosis and slight distortion of the spinal cord.     There is DDD throughout the entire cervical spine      ASSESSMENT:    Riley Daigle is a 52 y.o. female with severe neck, shoulder and LUE pain with a large at C6-7 disc-osteophyte resulting in severe foraminal stenosis and C7 nerve root compression      ICD-10-CM    1. Cervical spondylosis with radiculopathy  M47.22 APTT     CBC     Comprehensive Metabolic Panel     EKG 12 Lead     HCG Qualitative, Serum     Protime-INR     Type and Screen     Urinalysis with Reflex to Culture     XR CHEST (2 VW)      2. DDD (degenerative disc disease), cervical  M50.30       3. Pre-op chest exam  Z01.811 APTT     CBC     Comprehensive Metabolic Panel     EKG 12 Lead     HCG Qualitative, Serum     Protime-INR     Type and Screen     Urinalysis with Reflex to Culture     XR CHEST (2 VW)            PLAN:  Ms. Rosa Elena Givens and I had a long discussion. I explained that she has a large disc osteophyte that is resulting in severe compression of the left C7 nerve root. I explained this is likely contributing to her pain. We discussed various treatment options, however, she is already underwent an extensive amount of nonsurgical treatments. At this point she would like to move forward with surgical intervention. She will need an ACDF C6-7 (March 15th)    We discussed risks, complications and expectations, including but not limited to infection, paralysis, bowel and bladder dysfunction, need for revision procedure, persistent pain, spinal fluid leak, stroke and death. In addition, the benefits of the surgery were thoroughly discussed and the patient demonstrated a deep understanding. The patient wishes to proceed with surgical intervention. We will schedule for surgery in the near future.            Jeanie Nazario DO

## 2023-03-03 NOTE — PROGRESS NOTES
[facility]  Office Visit      Chief Complaint   Patient presents with    New Patient     Establishing care     Results     Imaging in Nucleus     Neck Pain     Patient states she has had pain for awhile and noticed it worsening in the past year. She states the pain does radiate into her BUE. She is taking Meloxicam and also receives epidural injections at [facility]. Numbness     Patient states she does have numbness/tingling in her BUE. HISTORY OF PRESENT ILLNESS:    Jonnie Michael is a 52 y.o. female who is currently unemployed who presents with neck and bilateral shoulder pain for over 15 years that has significantly worsened over the last year. The pain does radiate into posterolateral arm into the forearm and hand on the left. Her pain is mostly located in the neck and shoulders. The patient complains of numbness of the left arm. She does have numbness in the fingertips on the left, trouble using hands to perform fine motor tasks. She states she is dropping objects. The patient has underwent an stiff non-operative treatment course that has included:  NSAIDs - Ibuprofen and others  Muscle Relaxers Flexeril  Opiates   Gabapentin  Oral Steroids  Physical Therapy   Epidural Steroid Injections by Dr. Ale Cardoza note she does use tobacco and does not take blood thinning medications.                Past Medical History:   Diagnosis Date    Depression     Fibromyalgia     Hx of Graves' disease     Thyroid disease        Past Surgical History:   Procedure Laterality Date    CARPAL TUNNEL RELEASE       SECTION      CHOLECYSTECTOMY      DILATION AND CURETTAGE OF UTERUS      x 2    HC INJECTION PROCEDURE FOR SACROILIAC JOINT Right 2017    SACROILIAC JOINT INJECTION performed by Zoraida Kang at [address] INJECTION PROCEDURE FOR SACROILIAC JOINT Right 2019    SACROILIAC JOINT INJECTION performed by Aisha Saunders at [address] [facility] OR    LITHOTRIPSY      OTHER SURGICAL HISTORY      Falloipan tube removal    TONSILLECTOMY      WISDOM TOOTH EXTRACTION         Current Outpatient Medications   Medication Sig Dispense Refill    DULoxetine (CYMBALTA) 60 MG extended release capsule Take 60 mg by mouth in the morning and at bedtime      vitamin D (ERGOCALCIFEROL) 1.25 MG (49428 UT) CAPS capsule Take 50,000 Units by mouth once a week      fluticasone (FLONASE) 50 MCG/ACT nasal spray Spray 1 spray into each nostril every day      hydroxychloroquine (PLAQUENIL) 200 MG tablet Take 200 mg by mouth daily      hydrOXYzine HCl (ATARAX) 25 MG tablet Take 25 mg by mouth daily      levothyroxine (SYNTHROID) 100 MCG tablet Take 100 mcg by mouth daily      meloxicam (MOBIC) 15 MG tablet Take 15 mg by mouth daily      RABEprazole (ACIPHEX) 20 MG tablet Take 20 mg by mouth daily      cloNIDine (CATAPRES) 0.1 MG tablet Take 0.1 mg by mouth daily      Azelaic Acid 15 % GEL Apply topically See Admin Instructions      ASHWAGANDHA PO Take by mouth      diclofenac sodium (VOLTAREN) 1 % GEL Apply topically 2 times daily      methylphenidate (CONCERTA) 54 MG extended release tablet Take 54 mg by mouth every morning. levocetirizine (XYZAL) 5 MG tablet Take 5 mg by mouth nightly      ondansetron (ZOFRAN) 4 MG tablet Take 4 mg by mouth every 8 hours as needed for Nausea or Vomiting      Naproxen Sodium (ALEVE PO) Take by mouth      amphetamine-dextroamphetamine (ADDERALL) 10 MG tablet Take 1 tablet by mouth daily for 30 days. 30 tablet 0    clonazePAM (KLONOPIN) 0.5 MG tablet Take 1 tablet by mouth nightly as needed for Anxiety for up to 30 days.  30 tablet 0    levothyroxine (SYNTHROID) 137 MCG tablet Take 1 tablet by mouth daily (Patient not taking: Reported on 3/3/2023) 90 tablet 1    omeprazole (PRILOSEC) 10 MG delayed release capsule Take 1 capsule by mouth daily 30 capsule 3    PARoxetine (PAXIL) 10 MG tablet Take 1 tablet by mouth every morning 30 tablet 5    atomoxetine (STRATTERA) 100 MG capsule Take 100 mg by mouth daily      liothyronine (CYTOMEL) 5 MCG tablet Take 5 mcg by mouth daily      pregabalin (LYRICA) 75 MG capsule Take 75 mg by mouth 3 times daily. cyclobenzaprine (FLEXERIL) 10 MG tablet Take 10 mg by mouth 3 times daily as needed       No current facility-administered medications for this visit. Allergies:  Patient has no known allergies. Social History:   Social History     Tobacco Use   Smoking Status Every Day    Packs/day: 0.50    Types: Cigarettes    Start date: 1992   Smokeless Tobacco Never     Social History     Substance and Sexual Activity   Alcohol Use Yes    Comment: rarely         Family History:   Family History   Problem Relation Age of Onset    Other Father         Heart stents       REVIEW OF SYSTEMS:  Constitutional: Negative. HENT: Negative. Eyes: Negative. Respiratory: Negative. Cardiovascular: Negative. Gastrointestinal: Negative. Genitourinary: Negative. Musculoskeletal:  Positive for joint pain, myalgias and neck pain. Skin: Negative. Neurological:  Positive for dizziness, tingling and headaches. Endo/Heme/Allergies: Negative. Psychiatric/Behavioral: Negative. PHYSICAL EXAM:  Vitals:    03/03/23 0857   BP: 108/72   Pulse: 78   Resp: 18   SpO2: 98%     Constitutional: appears well-developed and well-nourished. Eyes - conjunctiva normal.  Pupils react to light  Ear, nose, throat - hearing intact to finger rub, No scars, masses, or lesions over external nose or ears, no atrophy oftongue  Neck- symmetric, no masses noted, no jugular vein distension  Respiration- chest wall appears symmetric, good expansion, normal effort without use of accessory muscles  Musculoskeletal - no significant wasting of muscles noted, no bony deformities, gait no gross ataxia  Extremities- no clubbing, cyanosis oredema  Skin - warm, dry, and intact.   No rash, erythema, or pallor. Psychiatric - mood, affect, and behavior appear normal.     Neurologic Examination  Awake, Alert and oriented x 4  Normal speech pattern, following commands    Motor:Her strength is difficult to assess due to pain limitations but she is at least 4/5 in all groups  RIGHT: hand grasp 4/5    finger extension 4/5    bicep 4/5    triceps 4/5    deltoid 4/5      LEFT:   hand grasp 4/5    finger extension 4/5    bicep 4/5    triceps 4/5    deltoid 4/5    No deficits to light touch or pinprick sensation  Reflexes are 2+ and symmetric  Babinski sign was downgoing bilaterally  No clonus or Hoffmans sign    DATA and IMAGING:        Lab Results   Component Value Date    WBC 9.6 08/26/2020    HGB 14.2 08/26/2020    HCT 43.4 08/26/2020    MCV 90.0 08/26/2020     08/26/2020     Lab Results   Component Value Date     08/26/2020    K 3.8 08/26/2020     08/26/2020    CO2 22 08/26/2020    BUN 13 08/26/2020    CREATININE 0.6 08/26/2020    GLUCOSE 97 08/26/2020    CALCIUM 9.3 08/26/2020    PROT 7.4 08/26/2020    LABALBU 4.1 08/26/2020    BILITOT 0.3 08/26/2020    ALKPHOS 72 08/26/2020    AST 11 08/26/2020    ALT 10 08/26/2020    LABGLOM >60 08/26/2020    GFRAA >59 08/26/2020     Lab Results   Component Value Date    INR 0.92 10/20/2014    PROTIME 12.1 10/20/2014     MRI Cervical spine, [facility] 8/16/22  I have personally reviewed these images and my interpretation is: At C4-5 there is mild to moderate right foraminal stenosis  At C5-6 there is mild right foraminal stenosis    At C6-7 there is a large disc-osteophyte complex resulting in severe right foraminal stenosis and compression of the right C7 nerve root. Re is also mild canal stenosis and slight distortion of the spinal cord.     There is DDD throughout the entire cervical spine      ASSESSMENT:    Lane Camacho is a 52 y.o. female with severe neck, shoulder and LUE pain with a large at C6-7 disc-osteophyte resulting in severe foraminal stenosis and C7 nerve root compression      ICD-10-CM    1. Cervical spondylosis with radiculopathy  M47.22 APTT     CBC     Comprehensive Metabolic Panel     EKG 12 Lead     HCG Qualitative, Serum     Protime-INR     Type and Screen     Urinalysis with Reflex to Culture     XR CHEST (2 VW)      2. DDD (degenerative disc disease), cervical  M50.30       3. Pre-op chest exam  Z01.811 APTT     CBC     Comprehensive Metabolic Panel     EKG 12 Lead     HCG Qualitative, Serum     Protime-INR     Type and Screen     Urinalysis with Reflex to Culture     XR CHEST (2 VW)            PLAN:  Ms. Monica Evans and I had a long discussion. I explained that she has a large disc osteophyte that is resulting in severe compression of the left C7 nerve root. I explained this is likely contributing to her pain. We discussed various treatment options, however, she is already underwent an extensive amount of nonsurgical treatments. At this point she would like to move forward with surgical intervention. She will need an ACDF C6-7 (March 15th)    We discussed risks, complications and expectations, including but not limited to infection, paralysis, bowel and bladder dysfunction, need for revision procedure, persistent pain, spinal fluid leak, stroke and death. In addition, the benefits of the surgery were thoroughly discussed and the patient demonstrated a deep understanding. The patient wishes to proceed with surgical intervention. We will schedule for surgery in the near future.            Olivier Callejas,

## 2023-03-10 ENCOUNTER — HOSPITAL ENCOUNTER (OUTPATIENT)
Dept: GENERAL RADIOLOGY | Age: 48
Discharge: HOME | End: 2023-03-10
Payer: MEDICAID

## 2023-03-10 ENCOUNTER — HOSPITAL ENCOUNTER (OUTPATIENT)
Dept: PREADMISSION TESTING | Age: 48
Discharge: HOME | End: 2023-03-14
Payer: MEDICAID

## 2023-03-10 VITALS — BODY MASS INDEX: 30.1 KG/M2 | HEIGHT: 66 IN | WEIGHT: 187.3 LBS

## 2023-03-10 DIAGNOSIS — Z01.811 PRE-OP CHEST EXAM: ICD-10-CM

## 2023-03-10 DIAGNOSIS — M47.22 CERVICAL SPONDYLOSIS WITH RADICULOPATHY: ICD-10-CM

## 2023-03-10 LAB
ABO/RH: NORMAL
ALBUMIN SERPL-MCNC: 4.2 G/DL (ref 3.5–5.2)
ALP BLD-CCNC: 76 U/L (ref 35–104)
ALT SERPL-CCNC: 6 U/L (ref 5–33)
ANION GAP SERPL CALCULATED.3IONS-SCNC: 8 MMOL/L (ref 7–19)
ANTIBODY SCREEN: NORMAL
APTT: 32.5 SEC (ref 26–36.2)
AST SERPL-CCNC: 14 U/L (ref 5–32)
BILIRUB SERPL-MCNC: <0.2 MG/DL (ref 0.2–1.2)
BILIRUBIN URINE: NEGATIVE
BLOOD, URINE: NEGATIVE
BUN BLDV-MCNC: 11 MG/DL (ref 6–20)
CALCIUM SERPL-MCNC: 9.3 MG/DL (ref 8.6–10)
CHLORIDE BLD-SCNC: 102 MMOL/L (ref 98–111)
CLARITY: CLEAR
CO2: 27 MMOL/L (ref 22–29)
COLOR: YELLOW
CREAT SERPL-MCNC: 0.8 MG/DL (ref 0.5–0.9)
GFR SERPL CREATININE-BSD FRML MDRD: >60 ML/MIN/{1.73_M2}
GLUCOSE BLD-MCNC: 89 MG/DL (ref 74–109)
GLUCOSE URINE: NEGATIVE MG/DL
HCG QUALITATIVE: NEGATIVE
HCT VFR BLD CALC: 44.3 % (ref 37–47)
HEMOGLOBIN: 14.6 G/DL (ref 12–16)
INR BLD: 1.05 (ref 0.88–1.18)
KETONES, URINE: NEGATIVE MG/DL
LEUKOCYTE ESTERASE, URINE: NEGATIVE
MCH RBC QN AUTO: 29.3 PG (ref 27–31)
MCHC RBC AUTO-ENTMCNC: 33 G/DL (ref 33–37)
MCV RBC AUTO: 88.8 FL (ref 81–99)
NITRITE, URINE: NEGATIVE
PDW BLD-RTO: 12.4 % (ref 11.5–14.5)
PH UA: 6 (ref 5–8)
PLATELET # BLD: 266 K/UL (ref 130–400)
PMV BLD AUTO: 10.5 FL (ref 9.4–12.3)
POTASSIUM SERPL-SCNC: 3.9 MMOL/L (ref 3.5–5)
PROTEIN UA: NEGATIVE MG/DL
PROTHROMBIN TIME: 13.6 SEC (ref 12–14.6)
RBC # BLD: 4.99 M/UL (ref 4.2–5.4)
SODIUM BLD-SCNC: 137 MMOL/L (ref 136–145)
SPECIFIC GRAVITY UA: 1.01 (ref 1–1.03)
TOTAL PROTEIN: 8 G/DL (ref 6.6–8.7)
UROBILINOGEN, URINE: 0.2 E.U./DL
WBC # BLD: 7.6 K/UL (ref 4.8–10.8)

## 2023-03-10 PROCEDURE — 85730 THROMBOPLASTIN TIME PARTIAL: CPT

## 2023-03-10 PROCEDURE — 86850 RBC ANTIBODY SCREEN: CPT

## 2023-03-10 PROCEDURE — 86900 BLOOD TYPING SEROLOGIC ABO: CPT

## 2023-03-10 PROCEDURE — 85610 PROTHROMBIN TIME: CPT

## 2023-03-10 PROCEDURE — 71046 X-RAY EXAM CHEST 2 VIEWS: CPT

## 2023-03-10 PROCEDURE — 84703 CHORIONIC GONADOTROPIN ASSAY: CPT

## 2023-03-10 PROCEDURE — 81003 URINALYSIS AUTO W/O SCOPE: CPT

## 2023-03-10 PROCEDURE — 85027 COMPLETE CBC AUTOMATED: CPT

## 2023-03-10 PROCEDURE — 80053 COMPREHEN METABOLIC PANEL: CPT

## 2023-03-10 PROCEDURE — 86901 BLOOD TYPING SEROLOGIC RH(D): CPT

## 2023-03-10 PROCEDURE — 93005 ELECTROCARDIOGRAM TRACING: CPT

## 2023-03-10 RX ORDER — ACETAMINOPHEN 325 MG/1
650 TABLET ORAL EVERY 6 HOURS PRN
COMMUNITY

## 2023-03-10 RX ORDER — HYDROXYZINE HYDROCHLORIDE 25 MG/1
25 TABLET, FILM COATED ORAL 2 TIMES DAILY
COMMUNITY

## 2023-03-10 NOTE — DISCHARGE INSTRUCTIONS
The day before surgery you will receive a phone call from the surgery nurse to let you know what time to arrive on the day of surgery. This call will usually be between 2-4 PM. If you do not receive a phone call by 4 PM the day before your surgery please call [phone] and let them know you have not received an arrival time. If your surgery is on Monday, your call will be on the Friday before your Monday surgery. The morning of surgery, you may take all your prescribed medications with a sip of water. Any exceptions to this would be listed below:  DO NOT [address]. PREOPERATIVE GUIDELINES WHEN RECEIVING ANESTHESIA    Do not eat or drink anything after midnight, the night before your surgery. This is extremely important for your safety. Take a bath (or shower) the night before your surgery and you may brush your teeth the morning of your surgery. You will be scheduled to arrive at the hospital 2 hours before your surgery, or follow your surgeon's instructions. Dress comfortably. Wear loose clothing that will be easy to remove and comfortable for your trip home. You may wear eyeglasses or contacts but bring your cases with you as they must be remove before your surgery. Hearing aids and dentures will need to be removed before your surgery. Do not wear any jewelry, including body jewelry. All jewelry will need to be removed prior to your surgery. Do not wear fingernail polish or make-up. It is best not to bring any valuables with you. If you are to stay in the hospital overnight, bring your robe, slippers and personal toiletries that you may need. POSTOPERATIVE GUIDELINES AFTER RECEIVING ANESTHESIA    If you are to go home after your surgery, you will need a responsible adult to drive you home.      You will not be able to take public transportation after your discharge from the Operative Care Unit unless you are accompanied by a responsible adult. On returning home, be sure to follow your physician's orders regarding diet, activity and medications. Remember, surgery with general anesthesia or sedation may leave you sleepy, very tired and with a decreased appetite for 12 to 24 hours. If you develop any post-surgical complications or problems, call your surgeon or [facility] Emergency Department ([phone]). [address] [place] [address] for Surgery Patients-Revised 6-    Visitors for surgery patients are essential for the patient's emotional well-being and care       post operatively. 2.   Visitor Expectations and Limitations        VISITORS MUST WEAR MASKS AT ALL TIMES. NO Cloth masks allowed. 3.  One visitor allowed with patients in the preop/postop rooms. 4.  A second visitor may sit in the waiting area. 5.  No children under 13 allowed in the pre-post op areas unless they are the patient. 6.  Two people may be with an underage surgical/procedural patient in preop/postop        room. 7.  If you are admitted to the hospital post operatively, there are NO RESTRICTIONS on       the floor at this time. 8.  If you are admitted to ICU postoperatively, you may have one visitor in the room from        7A-7P. A second visitor may sit in the ICU waiting room. No overnight visitors in         ICU waiting room.

## 2023-03-11 LAB
EKG P AXIS: 63 DEGREES
EKG P-R INTERVAL: 190 MS
EKG Q-T INTERVAL: 416 MS
EKG QRS DURATION: 80 MS
EKG QTC CALCULATION (BAZETT): 419 MS
EKG T AXIS: 39 DEGREES

## 2023-03-11 PROCEDURE — 93010 ELECTROCARDIOGRAM REPORT: CPT | Performed by: INTERNAL MEDICINE

## 2023-03-13 ENCOUNTER — TRANSCRIBE ORDERS (OUTPATIENT)
Dept: ADMINISTRATIVE | Facility: HOSPITAL | Age: 48
End: 2023-03-13
Payer: COMMERCIAL

## 2023-03-13 DIAGNOSIS — M54.16 LUMBAR RADICULOPATHY: Primary | ICD-10-CM

## 2023-03-14 ENCOUNTER — TELEPHONE (OUTPATIENT)
Dept: NEUROLOGY | Age: 48
End: 2023-03-14

## 2023-03-14 NOTE — TELEPHONE ENCOUNTER
Call was placed to patient in regards to surgery needing to be rescheduled left voicemail. Call was placed to patient's parent left voicemail to return call.

## 2023-03-16 ENCOUNTER — TELEPHONE (OUTPATIENT)
Dept: NEUROLOGY | Age: 48
End: 2023-03-16

## 2023-03-28 ENCOUNTER — HOSPITAL ENCOUNTER (OUTPATIENT)
Dept: MRI IMAGING | Facility: HOSPITAL | Age: 48
Discharge: HOME | End: 2023-03-28
Admitting: NURSE PRACTITIONER
Payer: COMMERCIAL

## 2023-03-28 DIAGNOSIS — M54.16 LUMBAR RADICULOPATHY: ICD-10-CM

## 2023-03-28 PROCEDURE — 72148 MRI LUMBAR SPINE W/O DYE: CPT

## 2023-04-21 ENCOUNTER — TELEPHONE (OUTPATIENT)
Dept: NEUROSURGERY | Age: 48
End: 2023-04-21

## 2023-04-21 NOTE — TELEPHONE ENCOUNTER
Patient was originally scheduled for surgery on 3/15/2023 but had to cancel due to insurance denying since she is a smoker. Patient stopped smoking on 3/20/2023 and started using nicotine patches. Deshawn Thomson RN resubmitted PA after 30 days of being smoke free. Patient is now rescheduled for 5/10/2023.     Status: APPROVED   Auth#: [id number]  Valid Dates: 5/10/2023-6/9/2023

## 2023-05-10 ENCOUNTER — HOSPITAL ENCOUNTER (OUTPATIENT)
Age: 48
Setting detail: OUTPATIENT SURGERY
Discharge: HOME | End: 2023-05-10
Attending: NEUROLOGICAL SURGERY | Admitting: NEUROLOGICAL SURGERY
Payer: MEDICAID

## 2023-05-10 ENCOUNTER — ANESTHESIA EVENT (OUTPATIENT)
Dept: OPERATING ROOM | Age: 48
End: 2023-05-10
Payer: MEDICAID

## 2023-05-10 ENCOUNTER — ANESTHESIA (OUTPATIENT)
Dept: OPERATING ROOM | Age: 48
End: 2023-05-10
Payer: MEDICAID

## 2023-05-10 ENCOUNTER — APPOINTMENT (OUTPATIENT)
Dept: GENERAL RADIOLOGY | Age: 48
End: 2023-05-10
Attending: NEUROLOGICAL SURGERY
Payer: MEDICAID

## 2023-05-10 VITALS
OXYGEN SATURATION: 93 % | BODY MASS INDEX: 30.05 KG/M2 | HEART RATE: 77 BPM | HEIGHT: 66 IN | WEIGHT: 187 LBS | SYSTOLIC BLOOD PRESSURE: 125 MMHG | TEMPERATURE: 97.4 F | DIASTOLIC BLOOD PRESSURE: 84 MMHG | RESPIRATION RATE: 16 BRPM

## 2023-05-10 DIAGNOSIS — G89.18 POST-OPERATIVE PAIN: Primary | ICD-10-CM

## 2023-05-10 PROBLEM — M47.22 CERVICAL SPONDYLOSIS WITH RADICULOPATHY: Status: ACTIVE | Noted: 2023-03-03

## 2023-05-10 LAB
ABO + RH BLD: NORMAL
BLD GP AB SCN SERPL QL: NORMAL
HCG UR QL: NEGATIVE

## 2023-05-10 PROCEDURE — 2709999900 HC NON-CHARGEABLE SUPPLY: Performed by: NEUROLOGICAL SURGERY

## 2023-05-10 PROCEDURE — 86901 BLOOD TYPING SEROLOGIC RH(D): CPT

## 2023-05-10 PROCEDURE — 3600000004 HC SURGERY LEVEL 4 BASE: Performed by: NEUROLOGICAL SURGERY

## 2023-05-10 PROCEDURE — 2720000010 HC SURG SUPPLY STERILE: Performed by: NEUROLOGICAL SURGERY

## 2023-05-10 PROCEDURE — 6360000002 HC RX W HCPCS: Performed by: ANESTHESIOLOGY

## 2023-05-10 PROCEDURE — 36415 COLL VENOUS BLD VENIPUNCTURE: CPT

## 2023-05-10 PROCEDURE — C1713 ANCHOR/SCREW BN/BN,TIS/BN: HCPCS | Performed by: NEUROLOGICAL SURGERY

## 2023-05-10 PROCEDURE — 2580000003 HC RX 258: Performed by: NEUROLOGICAL SURGERY

## 2023-05-10 PROCEDURE — 3600000014 HC SURGERY LEVEL 4 ADDTL 15MIN: Performed by: NEUROLOGICAL SURGERY

## 2023-05-10 PROCEDURE — 7100000000 HC PACU RECOVERY - FIRST 15 MIN: Performed by: NEUROLOGICAL SURGERY

## 2023-05-10 PROCEDURE — 7100000001 HC PACU RECOVERY - ADDTL 15 MIN: Performed by: NEUROLOGICAL SURGERY

## 2023-05-10 PROCEDURE — 86850 RBC ANTIBODY SCREEN: CPT

## 2023-05-10 PROCEDURE — 7100000010 HC PHASE II RECOVERY - FIRST 15 MIN: Performed by: NEUROLOGICAL SURGERY

## 2023-05-10 PROCEDURE — 2580000003 HC RX 258: Performed by: ANESTHESIOLOGY

## 2023-05-10 PROCEDURE — 7100000011 HC PHASE II RECOVERY - ADDTL 15 MIN: Performed by: NEUROLOGICAL SURGERY

## 2023-05-10 PROCEDURE — 2500000003 HC RX 250 WO HCPCS: Performed by: ANESTHESIOLOGY

## 2023-05-10 PROCEDURE — 6360000002 HC RX W HCPCS: Performed by: NURSE ANESTHETIST, CERTIFIED REGISTERED

## 2023-05-10 PROCEDURE — 86900 BLOOD TYPING SEROLOGIC ABO: CPT

## 2023-05-10 PROCEDURE — 3700000001 HC ADD 15 MINUTES (ANESTHESIA): Performed by: NEUROLOGICAL SURGERY

## 2023-05-10 PROCEDURE — 2500000003 HC RX 250 WO HCPCS: Performed by: NEUROLOGICAL SURGERY

## 2023-05-10 PROCEDURE — 72040 X-RAY EXAM NECK SPINE 2-3 VW: CPT

## 2023-05-10 PROCEDURE — 22853 INSJ BIOMECHANICAL DEVICE: CPT | Performed by: NEUROLOGICAL SURGERY

## 2023-05-10 PROCEDURE — A4216 STERILE WATER/SALINE, 10 ML: HCPCS | Performed by: ANESTHESIOLOGY

## 2023-05-10 PROCEDURE — 84703 CHORIONIC GONADOTROPIN ASSAY: CPT

## 2023-05-10 PROCEDURE — A4217 STERILE WATER/SALINE, 500 ML: HCPCS | Performed by: NEUROLOGICAL SURGERY

## 2023-05-10 PROCEDURE — 22551 ARTHRD ANT NTRBDY CERVICAL: CPT | Performed by: NEUROLOGICAL SURGERY

## 2023-05-10 PROCEDURE — 6360000002 HC RX W HCPCS: Performed by: NEUROLOGICAL SURGERY

## 2023-05-10 PROCEDURE — L0120 CERV FLEX N/ADJ FOAM PRE OTS: HCPCS | Performed by: NEUROLOGICAL SURGERY

## 2023-05-10 PROCEDURE — 2580000003 HC RX 258: Performed by: NURSE ANESTHETIST, CERTIFIED REGISTERED

## 2023-05-10 PROCEDURE — 22845 INSERT SPINE FIXATION DEVICE: CPT | Performed by: NEUROLOGICAL SURGERY

## 2023-05-10 PROCEDURE — 2500000003 HC RX 250 WO HCPCS: Performed by: NURSE ANESTHETIST, CERTIFIED REGISTERED

## 2023-05-10 PROCEDURE — 3700000000 HC ANESTHESIA ATTENDED CARE: Performed by: NEUROLOGICAL SURGERY

## 2023-05-10 RX ORDER — HYDROMORPHONE HYDROCHLORIDE 1 MG/ML
0.25 INJECTION, SOLUTION INTRAMUSCULAR; INTRAVENOUS; SUBCUTANEOUS EVERY 5 MIN PRN
Status: DISCONTINUED | OUTPATIENT
Start: 2023-05-10 | End: 2023-05-10 | Stop reason: HOSPADM

## 2023-05-10 RX ORDER — SODIUM CHLORIDE 0.9 % (FLUSH) 0.9 %
5-40 SYRINGE (ML) INJECTION PRN
Status: DISCONTINUED | OUTPATIENT
Start: 2023-05-10 | End: 2023-05-10 | Stop reason: HOSPADM

## 2023-05-10 RX ORDER — CEFAZOLIN SODIUM 1 G/3ML
INJECTION, POWDER, FOR SOLUTION INTRAMUSCULAR; INTRAVENOUS PRN
Status: DISCONTINUED | OUTPATIENT
Start: 2023-05-10 | End: 2023-05-10 | Stop reason: SDUPTHER

## 2023-05-10 RX ORDER — EPHEDRINE SULFATE 50 MG/ML
INJECTION, SOLUTION INTRAVENOUS PRN
Status: DISCONTINUED | OUTPATIENT
Start: 2023-05-10 | End: 2023-05-10 | Stop reason: SDUPTHER

## 2023-05-10 RX ORDER — ONDANSETRON 2 MG/ML
4 INJECTION INTRAMUSCULAR; INTRAVENOUS
Status: DISCONTINUED | OUTPATIENT
Start: 2023-05-10 | End: 2023-05-10 | Stop reason: HOSPADM

## 2023-05-10 RX ORDER — FENTANYL CITRATE 50 UG/ML
INJECTION, SOLUTION INTRAMUSCULAR; INTRAVENOUS PRN
Status: DISCONTINUED | OUTPATIENT
Start: 2023-05-10 | End: 2023-05-10 | Stop reason: SDUPTHER

## 2023-05-10 RX ORDER — DEXAMETHASONE SODIUM PHOSPHATE 4 MG/ML
4 INJECTION, SOLUTION INTRA-ARTICULAR; INTRALESIONAL; INTRAMUSCULAR; INTRAVENOUS; SOFT TISSUE ONCE
Status: COMPLETED | OUTPATIENT
Start: 2023-05-10 | End: 2023-05-10

## 2023-05-10 RX ORDER — DEXAMETHASONE SODIUM PHOSPHATE 10 MG/ML
INJECTION, SOLUTION INTRAMUSCULAR; INTRAVENOUS PRN
Status: DISCONTINUED | OUTPATIENT
Start: 2023-05-10 | End: 2023-05-10 | Stop reason: SDUPTHER

## 2023-05-10 RX ORDER — DEXMEDETOMIDINE HYDROCHLORIDE 100 UG/ML
INJECTION, SOLUTION INTRAVENOUS PRN
Status: DISCONTINUED | OUTPATIENT
Start: 2023-05-10 | End: 2023-05-10 | Stop reason: SDUPTHER

## 2023-05-10 RX ORDER — PROPOFOL 10 MG/ML
INJECTION, EMULSION INTRAVENOUS PRN
Status: DISCONTINUED | OUTPATIENT
Start: 2023-05-10 | End: 2023-05-10 | Stop reason: SDUPTHER

## 2023-05-10 RX ORDER — SODIUM CHLORIDE 9 MG/ML
INJECTION, SOLUTION INTRAVENOUS PRN
Status: DISCONTINUED | OUTPATIENT
Start: 2023-05-10 | End: 2023-05-10 | Stop reason: HOSPADM

## 2023-05-10 RX ORDER — HYDROMORPHONE HYDROCHLORIDE 1 MG/ML
0.5 INJECTION, SOLUTION INTRAMUSCULAR; INTRAVENOUS; SUBCUTANEOUS EVERY 5 MIN PRN
Status: COMPLETED | OUTPATIENT
Start: 2023-05-10 | End: 2023-05-10

## 2023-05-10 RX ORDER — HYDROCODONE BITARTRATE AND ACETAMINOPHEN 5; 325 MG/1; MG/1
1 TABLET ORAL EVERY 4 HOURS PRN
Qty: 42 TABLET | Refills: 0 | Status: SHIPPED | OUTPATIENT
Start: 2023-05-10 | End: 2023-05-17

## 2023-05-10 RX ORDER — SODIUM CHLORIDE, SODIUM LACTATE, POTASSIUM CHLORIDE, CALCIUM CHLORIDE 600; 310; 30; 20 MG/100ML; MG/100ML; MG/100ML; MG/100ML
INJECTION, SOLUTION INTRAVENOUS CONTINUOUS PRN
Status: DISCONTINUED | OUTPATIENT
Start: 2023-05-10 | End: 2023-05-10 | Stop reason: SDUPTHER

## 2023-05-10 RX ORDER — ROCURONIUM BROMIDE 10 MG/ML
INJECTION, SOLUTION INTRAVENOUS PRN
Status: DISCONTINUED | OUTPATIENT
Start: 2023-05-10 | End: 2023-05-10 | Stop reason: SDUPTHER

## 2023-05-10 RX ORDER — SODIUM CHLORIDE 0.9 % (FLUSH) 0.9 %
5-40 SYRINGE (ML) INJECTION EVERY 12 HOURS SCHEDULED
Status: DISCONTINUED | OUTPATIENT
Start: 2023-05-10 | End: 2023-05-10 | Stop reason: HOSPADM

## 2023-05-10 RX ORDER — ONDANSETRON 2 MG/ML
INJECTION INTRAMUSCULAR; INTRAVENOUS PRN
Status: DISCONTINUED | OUTPATIENT
Start: 2023-05-10 | End: 2023-05-10 | Stop reason: SDUPTHER

## 2023-05-10 RX ORDER — SODIUM CHLORIDE, SODIUM LACTATE, POTASSIUM CHLORIDE, CALCIUM CHLORIDE 600; 310; 30; 20 MG/100ML; MG/100ML; MG/100ML; MG/100ML
INJECTION, SOLUTION INTRAVENOUS CONTINUOUS
Status: DISCONTINUED | OUTPATIENT
Start: 2023-05-10 | End: 2023-05-10 | Stop reason: HOSPADM

## 2023-05-10 RX ORDER — LIDOCAINE HYDROCHLORIDE 10 MG/ML
INJECTION, SOLUTION INFILTRATION; PERINEURAL PRN
Status: DISCONTINUED | OUTPATIENT
Start: 2023-05-10 | End: 2023-05-10 | Stop reason: SDUPTHER

## 2023-05-10 RX ORDER — MIDAZOLAM HYDROCHLORIDE 1 MG/ML
INJECTION INTRAMUSCULAR; INTRAVENOUS PRN
Status: DISCONTINUED | OUTPATIENT
Start: 2023-05-10 | End: 2023-05-10 | Stop reason: SDUPTHER

## 2023-05-10 RX ADMIN — LIDOCAINE HYDROCHLORIDE 50 MG: 10 INJECTION, SOLUTION INFILTRATION; PERINEURAL at 11:54

## 2023-05-10 RX ADMIN — ROCURONIUM BROMIDE 70 MG: 10 INJECTION, SOLUTION INTRAVENOUS at 11:56

## 2023-05-10 RX ADMIN — FENTANYL CITRATE 50 MCG: 0.05 INJECTION, SOLUTION INTRAMUSCULAR; INTRAVENOUS at 13:54

## 2023-05-10 RX ADMIN — EPHEDRINE SULFATE 10 MG: 50 INJECTION INTRAMUSCULAR; INTRAVENOUS; SUBCUTANEOUS at 12:29

## 2023-05-10 RX ADMIN — ONDANSETRON 4 MG: 2 INJECTION INTRAMUSCULAR; INTRAVENOUS at 13:26

## 2023-05-10 RX ADMIN — FENTANYL CITRATE 50 MCG: 0.05 INJECTION, SOLUTION INTRAMUSCULAR; INTRAVENOUS at 12:30

## 2023-05-10 RX ADMIN — SODIUM CHLORIDE, SODIUM LACTATE, POTASSIUM CHLORIDE, AND CALCIUM CHLORIDE: 600; 310; 30; 20 INJECTION, SOLUTION INTRAVENOUS at 12:26

## 2023-05-10 RX ADMIN — FAMOTIDINE 20 MG: 10 INJECTION, SOLUTION INTRAVENOUS at 09:14

## 2023-05-10 RX ADMIN — MIDAZOLAM 2 MG: 1 INJECTION INTRAMUSCULAR; INTRAVENOUS at 11:52

## 2023-05-10 RX ADMIN — DEXAMETHASONE SODIUM PHOSPHATE 4 MG: 4 INJECTION, SOLUTION INTRAMUSCULAR; INTRAVENOUS at 09:15

## 2023-05-10 RX ADMIN — HYDROMORPHONE HYDROCHLORIDE 0.5 MG: 1 INJECTION, SOLUTION INTRAMUSCULAR; INTRAVENOUS; SUBCUTANEOUS at 14:06

## 2023-05-10 RX ADMIN — CEFAZOLIN SODIUM 2 G: 1 INJECTION, POWDER, FOR SOLUTION INTRAMUSCULAR; INTRAVENOUS at 12:07

## 2023-05-10 RX ADMIN — HYDROMORPHONE HYDROCHLORIDE 0.5 MG: 1 INJECTION, SOLUTION INTRAMUSCULAR; INTRAVENOUS; SUBCUTANEOUS at 14:17

## 2023-05-10 RX ADMIN — FENTANYL CITRATE 100 MCG: 0.05 INJECTION, SOLUTION INTRAMUSCULAR; INTRAVENOUS at 11:54

## 2023-05-10 RX ADMIN — PHENYLEPHRINE HYDROCHLORIDE 100 MCG: 10 INJECTION INTRAVENOUS at 13:04

## 2023-05-10 RX ADMIN — SODIUM CHLORIDE, SODIUM LACTATE, POTASSIUM CHLORIDE, AND CALCIUM CHLORIDE: 600; 310; 30; 20 INJECTION, SOLUTION INTRAVENOUS at 09:12

## 2023-05-10 RX ADMIN — PHENYLEPHRINE HYDROCHLORIDE 100 MCG: 10 INJECTION INTRAVENOUS at 12:24

## 2023-05-10 RX ADMIN — SODIUM CHLORIDE, SODIUM LACTATE, POTASSIUM CHLORIDE, AND CALCIUM CHLORIDE: 600; 310; 30; 20 INJECTION, SOLUTION INTRAVENOUS at 08:51

## 2023-05-10 RX ADMIN — HYDROMORPHONE HYDROCHLORIDE 0.5 MG: 1 INJECTION, SOLUTION INTRAMUSCULAR; INTRAVENOUS; SUBCUTANEOUS at 14:31

## 2023-05-10 RX ADMIN — DEXMEDETOMIDINE HYDROCHLORIDE 20 MCG: 100 INJECTION, SOLUTION, CONCENTRATE INTRAVENOUS at 12:17

## 2023-05-10 RX ADMIN — HYDROMORPHONE HYDROCHLORIDE 0.5 MG: 1 INJECTION, SOLUTION INTRAMUSCULAR; INTRAVENOUS; SUBCUTANEOUS at 14:40

## 2023-05-10 RX ADMIN — PROPOFOL 160 MG: 10 INJECTION, EMULSION INTRAVENOUS at 11:55

## 2023-05-10 RX ADMIN — SODIUM CHLORIDE, SODIUM LACTATE, POTASSIUM CHLORIDE, AND CALCIUM CHLORIDE: 600; 310; 30; 20 INJECTION, SOLUTION INTRAVENOUS at 11:52

## 2023-05-10 RX ADMIN — SUGAMMADEX 200 MG: 100 INJECTION, SOLUTION INTRAVENOUS at 13:32

## 2023-05-10 RX ADMIN — PHENYLEPHRINE HYDROCHLORIDE 100 MCG: 10 INJECTION INTRAVENOUS at 12:08

## 2023-05-10 RX ADMIN — FENTANYL CITRATE 50 MCG: 0.05 INJECTION, SOLUTION INTRAMUSCULAR; INTRAVENOUS at 12:52

## 2023-05-10 RX ADMIN — DEXAMETHASONE SODIUM PHOSPHATE 4 MG: 10 INJECTION, SOLUTION INTRAMUSCULAR; INTRAVENOUS at 12:11

## 2023-05-10 ASSESSMENT — PAIN DESCRIPTION - LOCATION
LOCATION: NECK
LOCATION: INCISION;NECK
LOCATION: NECK
LOCATION: INCISION;NECK
LOCATION: NECK
LOCATION: NECK

## 2023-05-10 ASSESSMENT — PAIN SCALES - GENERAL
PAINLEVEL_OUTOF10: 0
PAINLEVEL_OUTOF10: 8
PAINLEVEL_OUTOF10: 8
PAINLEVEL_OUTOF10: 2
PAINLEVEL_OUTOF10: 3
PAINLEVEL_OUTOF10: 7
PAINLEVEL_OUTOF10: 7

## 2023-05-10 ASSESSMENT — LIFESTYLE VARIABLES: SMOKING_STATUS: 0

## 2023-05-10 ASSESSMENT — PAIN DESCRIPTION - PAIN TYPE
TYPE: SURGICAL PAIN
TYPE: SURGICAL PAIN

## 2023-05-10 ASSESSMENT — PAIN DESCRIPTION - DESCRIPTORS
DESCRIPTORS: DULL
DESCRIPTORS: ACHING
DESCRIPTORS: DULL
DESCRIPTORS: ACHING

## 2023-05-10 NOTE — DISCHARGE INSTRUCTIONS
Your incision is to stay dry for one week. You should not remove or change the dressing. If the dressing becomes wet or saturated call the office to determine what should be done. Sponge bathe only  It is normal to have some swelling at the incision site. Do not lift anything heavier than a coffee cup and newspaper! No exceptions. Do not push and pull with your arms. This includes sweeping, mopping, vacuuming, and removing laundry from the washer and dryer. You may fold clothes but do not lift the basket full of clothes! Do not drive until cleared by Dr. Catrachita Gomez. This will be AT LEAST 2 weeks, it could be longer. Limit gentle twisting, you should not do anything extreme! Do not bend down to pick anything up. Walk every day. Walking around in your house is fine to begin. Increase distance slowly. Walk short distances several times a day instead of long distances once a day. You have symptoms of infection such as: Increased pain, swelling, warmth, or redness, Red streaks leading from the incision, Pus draining from the incision, A fever please call the office immediately. PLEASE SEE YOUR \"FOR THE PATIENT - PATHWAYS - A TISSUE DONATION THANK-YOU LETTER PROGRAM FOLDER    PLEASE FOLLOW ANY POST OP INSTRUCTIONS PROVIDED TO YOU BY DR OBRIEN - CALL WITH ANY QUESTIONS, CONCERNS OR PROBLEMS THAT MIGHT ARISE.

## 2023-05-10 NOTE — OP NOTE
NEUROSURGICAL OPERATIVE REPORT      NAME OF SURGEON: ADONAY OBRIEN DO      DATE OF SERVICE: 5/10/2023      PREOPERATIVE DIAGNOSES    C6-C7 cervical spondylosis withbilateral C7 radiculopathy, recalcitrant to nonoperative management. POSTOPERATIVE DIAGNOSES    Same      OPERATIVE PROCEDURES  1. C6-C7 anterior cervical discectomy with resection of marginal osteophytes acting into the foramina for decompression the patient's C7 nerve roots utilizing the operating microscope and microdissection technique. 2. C6-C7 anterior cervical fusion utilizing structural allograft bone and local autograft bone. 3 Insertion of intervertebral biomechanical device C6-C7. 4. Placement of anterior cervical plate C6, C7.      SURGEON  Patricia Villeda DO      FIRST ASSIST    MARLY Campos      HISTORY AND INDICATIONS  15-year-old female who presented to our clinic with severe neck and bilateral shoulder pain that progressed over the last year. Pain radiated in the posterior lateral aspect of the arm into the forearm and hand on the left. Her pain was mostly in the neck and shoulders. She complained of numbness of the left arm. She underwent a multitude of nonoperative treatments that included nonsteroidal anti-inflammatory drugs, muscle relaxers, opiates, gabapentin, physical therapy, epidural steroid injections and chiropractor manipulation without any significant success    Upon neurologic examination, she was noted to have bilateral hand grasp tricep and bicep weakness 4/5. Her exam was difficult to assess due to severe pain. Her reflexes were 2+ and symmetric she had no sensation loss. MRI of the cervical spine was completed and at C6-7 there was a large disc osteophyte complex that resulted in severe foraminal stenosis. Due to the progression pain despite an extensive nonoperative treatment course, we discussed surgical intervention. The patient ultimately to move forward with surgery.     Estimated blood

## 2023-05-10 NOTE — ANESTHESIA PRE PROCEDURE
HEPCAB    COVID-19 Screening (If Applicable): No results found for: COVID19        Anesthesia Evaluation  Patient summary reviewed no history of anesthetic complications:   Airway: Mallampati: III  TM distance: >3 FB   Neck ROM: limited  Comment: Poor neck extension  Mouth opening: > = 3 FB   Dental:          Pulmonary:normal exam  breath sounds clear to auscultation      (-) asthma, recent URI, sleep apnea and not a current smoker          Patient did not smoke on day of surgery. Cardiovascular:  Exercise tolerance: good (>4 METS),       (-) pacemaker, hypertension, past MI, CABG/stent and  angina      Rhythm: regular  Rate: normal           Beta Blocker:  Not on Beta Blocker         Neuro/Psych:   (+) neuromuscular disease (Fibromyalgia):, psychiatric history:depression/anxiety    (-) seizures, TIA and CVA           GI/Hepatic/Renal:        (-) GERD, liver disease and no renal disease       Endo/Other:    (+) hypothyroidism::., .    (-) diabetes mellitus, hyperthyroidism               Abdominal:             Vascular:     - DVT. Other Findings:           Anesthesia Plan      general     ASA 2     (Preop famotidine, dexamethasone)  Induction: intravenous. MIPS: Postoperative opioids intended and Prophylactic antiemetics administered. Anesthetic plan and risks discussed with patient, mother and father. Use of blood products discussed with patient, father and mother whom.                      Gale Baker MD   5/10/2023

## 2023-05-10 NOTE — PROGRESS NOTES
CLINICAL PHARMACY NOTE: MEDS TO BEDS    Total # of Prescriptions Filled: 1   The following medications were delivered to the patient:  Current Discharge Medication List        START taking these medications    Details   HYDROcodone-acetaminophen (NORCO) 5-325 MG per tablet Take 1 tablet by mouth every 4 hours as needed for Pain for up to 7 days. Intended supply: 7 days. Take lowest dose possible to manage pain Max Daily Amount: 6 tablets  Qty: 42 tablet, Refills: 0    Comments: Reduce doses taken as pain becomes manageable  Associated Diagnoses: Post-operative pain             Additional Documentation:     Delivered Rx's to [facility]. Gave Rx's to patients parents at bedside. Paid $0.00.

## 2023-05-10 NOTE — ANESTHESIA POSTPROCEDURE EVALUATION
Department of Anesthesiology  Postprocedure Note    Patient: Ysuef Menchaca  MRN: [id number]  YOB: 1975  Date of evaluation: 5/10/2023      Procedure Summary     Date: 05/10/23 Room / Location: [address]    Anesthesia Start: 1152 Anesthesia Stop: 1355    Procedure: ACDF C6-7 (Neck) Diagnosis:       Cervical spondylosis with radiculopathy      (Cervical spondylosis with radiculopathy [M47.22])    Surgeons: Thuy Carpio DO Responsible Provider: VENITA Becerril CRNA    Anesthesia Type: general ASA Status: 2          Anesthesia Type: No value filed.     Dominique Phase I: Dominique Score: 8    Dominiqeu Phase II:        Anesthesia Post Evaluation    Patient location during evaluation: PACU  Patient participation: complete - patient participated  Level of consciousness: sleepy but conscious  Pain score: 4  Airway patency: patent  Nausea & Vomiting: no nausea and no vomiting  Complications: no  Cardiovascular status: hemodynamically stable and blood pressure returned to baseline  Respiratory status: acceptable, spontaneous ventilation, nasal cannula and nonlabored ventilation  Hydration status: stable  Comments: /73   Pulse 90   Temp 97.4 °F (36.3 °C)   Resp 15   Ht 5' 6\" (1.676 m)   Wt 187 lb (84.8 kg)   SpO2 95%   BMI 30.18 kg/m²

## 2023-05-10 NOTE — BRIEF OP NOTE
Brief Postoperative Note      Patient: Fracisco Hudson  YOB: 1975  MRN: [id number]    Date of Procedure: 5/10/2023    Pre-Op Diagnosis Codes:     * Cervical spondylosis with radiculopathy [M47.22]    Post-Op Diagnosis: Same       Procedure(s):  ACDF C6-7    Surgeon(s):  Jun Sullivan DO    Assistant:  * No surgical staff found *    Anesthesia: General    Estimated Blood Loss (mL): less than 50     Complications: None    Specimens:   * No specimens in log *    Implants:  Implant Name Type Inv. Item Serial No.  Lot No. LRB No. Used Action   GRAFT HUM TISS [id number] ALLGRFT VIKY CORNERSTONE - [id number]  GRAFT HUM TISS [id number] ALLGRFT VIKY CORNERSTONE [id number] [employer] SPINALGRAFT TECH-WD [id number] N/A 1 Implanted   PLATE SPNL [id number] LEV 1 ANT CERV TI ZEVO - [id number]  PLATE SPNL [id number] LEV 1 ANT CERV TI ZEVO  [employer] SOFAMOR DANEK-WD  N/A 1 Implanted   SCREW SPNL L13MM DIA3. 5MM ANT CERV TI SELF DRL LEISA ANG FOR - [id number]  SCREW SPNL L13MM DIA3. 5MM ANT CERV TI SELF DRL LEISA ANG FOR  [employer] SOFAMOR DANEK-WD  N/A 4 Implanted         Drains: * No LDAs found *    Findings: Large disc osteophyte complex resulting in severe compression of the left C7 nerve root was noted. Nerve root was decompressed nicely no issues.       Electronically signed by Jun Sullivan DO on 5/10/2023 at 1:47 PM

## 2023-05-10 NOTE — H&P
[facility] Neurosurgery  H&P             Chief Complaint   Patient presents with    New Patient       Establishing care     Results       Imaging in Nucleus     Neck Pain       Patient states she has had pain for awhile and noticed it worsening in the past year. She states the pain does radiate into her BUE. She is taking Meloxicam and also receives epidural injections at [facility]. Numbness       Patient states she does have numbness/tingling in her BUE. HISTORY OF PRESENT ILLNESS:     Miriam Nuno is a 52 y.o. female who is currently unemployed who presents with neck and bilateral shoulder pain for over 15 years that has significantly worsened over the last year. The pain does radiate into posterolateral arm into the forearm and hand on the left. Her pain is mostly located in the neck and shoulders. The patient complains of numbness of the left arm. She does have numbness in the fingertips on the left, trouble using hands to perform fine motor tasks. She states she is dropping objects. The patient has underwent an stiff non-operative treatment course that has included:  NSAIDs - Ibuprofen and others  Muscle Relaxers Flexeril  Opiates   Gabapentin  Oral Steroids  Physical Therapy   Epidural Steroid Injections by Dr. Reyes Layman note she does use tobacco and does not take blood thinning medications.                      Past Medical History:   Diagnosis Date    Depression      Fibromyalgia      Hx of Graves' disease      Thyroid disease                 Past Surgical History:   Procedure Laterality Date    CARPAL TUNNEL RELEASE         SECTION        CHOLECYSTECTOMY        DILATION AND CURETTAGE OF UTERUS         x 2    HC INJECTION PROCEDURE FOR SACROILIAC JOINT Right 2017     SACROILIAC JOINT INJECTION performed by Greyson Gutierrez at [address] INJECTION PROCEDURE FOR SACROILIAC JOINT Right 2019

## 2023-05-17 ENCOUNTER — OFFICE VISIT (OUTPATIENT)
Dept: NEUROSURGERY | Age: 48
End: 2023-05-17

## 2023-05-17 VITALS
OXYGEN SATURATION: 98 % | WEIGHT: 187 LBS | HEIGHT: 66 IN | HEART RATE: 87 BPM | SYSTOLIC BLOOD PRESSURE: 126 MMHG | BODY MASS INDEX: 30.05 KG/M2 | DIASTOLIC BLOOD PRESSURE: 86 MMHG | RESPIRATION RATE: 18 BRPM

## 2023-05-17 DIAGNOSIS — Z48.89 ENCOUNTER FOR POST SURGICAL WOUND CHECK: Primary | ICD-10-CM

## 2023-05-17 DIAGNOSIS — Z98.1 S/P CERVICAL SPINAL FUSION: ICD-10-CM

## 2023-05-17 PROCEDURE — 99024 POSTOP FOLLOW-UP VISIT: CPT | Performed by: NURSE PRACTITIONER

## 2023-05-17 ASSESSMENT — ENCOUNTER SYMPTOMS
EYES NEGATIVE: 1
RESPIRATORY NEGATIVE: 1
GASTROINTESTINAL NEGATIVE: 1

## 2023-05-17 NOTE — PROGRESS NOTES
[facility] Neurosurgery  Office Visit      Chief Complaint   Patient presents with    Wound Check     Follow up after ACDF C6-7    Neck Pain     Patient states she is having trouble swallowing and feels there is a \"lump\" in her throat. Numbness     Patient denies symptoms of numbness/tingling in her BUE. HISTORY OF PRESENT ILLNESS:      Georgia Hager is a 52 y.o. female who underwent an ACDF C6-7 for cervical spondylosis with radiculopathy on 5/10/2023 and now she is 1 week out from her surgery. Prior to surgery she complained of neck and bilateral shoulder pain. The pain radiated into the posterior lateral arm into the forearm and left hand. She complains of numbness in the same distribution. She did have fine motor impairment and dropping objects. Today she states that her shoulder and arm pain are now gone. Denies any numbness or tingling. She does admit to some trouble swallowing and the feeling of a lump in her throat when eating. She denies any fever, chills, nausea, or vomiting.        Past Medical History:   Diagnosis Date    Depression     Fibromyalgia     Hx of Graves' disease     Thyroid disease        Past Surgical History:   Procedure Laterality Date    CARPAL TUNNEL RELEASE      CERVICAL FUSION N/A 5/10/2023    [facility]      x 2    HC INJECTION PROCEDURE FOR SACROILIAC JOINT Right 8/29/2017    SACROILIAC JOINT INJECTION performed by John Montalvo at [address] Right 9/17/2019    SACROILIAC JOINT INJECTION performed by John Montalvo at [address] tube removal    TONSILLECTOMY      WISDOM TOOTH EXTRACTION          Medications    Current Outpatient Medications:     HYDROcodone-acetaminophen (NORCO) 5-325 MG per tablet, Take 1 tablet by mouth every 4

## 2023-05-17 NOTE — PROGRESS NOTES
Review of Systems   Constitutional: Negative.    HENT: Negative.     Eyes: Negative.    Respiratory: Negative.     Cardiovascular: Negative.    Gastrointestinal: Negative.    Genitourinary: Negative.    Musculoskeletal:  Positive for joint pain, myalgias and neck pain.   Skin: Negative.    Neurological: Negative.    Endo/Heme/Allergies: Negative.    Psychiatric/Behavioral: Negative.

## 2023-05-18 ENCOUNTER — TRANSCRIBE ORDERS (OUTPATIENT)
Dept: ADMINISTRATIVE | Facility: HOSPITAL | Age: 48
End: 2023-05-18
Payer: COMMERCIAL

## 2023-05-18 DIAGNOSIS — E03.9 HYPOTHYROIDISM, UNSPECIFIED TYPE: Primary | ICD-10-CM

## 2023-05-24 ENCOUNTER — TELEPHONE (OUTPATIENT)
Dept: NEUROSURGERY | Age: 48
End: 2023-05-24

## 2023-05-24 NOTE — TELEPHONE ENCOUNTER
Patient called to ask if it is normal to still be sore. I stated that her surgery was only two weeks ago and it is normal for her to be sore. She asked when she is able to drive. She was not released at last appt. . I stated that driving will be re assess at next appt. She stated that she has been driving with her hard collar on. I advised her not to do this, she VU.

## 2023-06-02 ENCOUNTER — TELEPHONE (OUTPATIENT)
Dept: ENT CLINIC | Age: 48
End: 2023-06-02

## 2023-06-02 NOTE — TELEPHONE ENCOUNTER
Patient returning a called back to the office patient had referral. Patient wanted in sooner then [facility] could scheduled . Please called the patient.       Thank You

## 2023-07-18 ENCOUNTER — TELEPHONE (OUTPATIENT)
Dept: NEUROSURGERY | Age: 48
End: 2023-07-18

## 2023-07-18 NOTE — TELEPHONE ENCOUNTER
Patient called stating that she is having worsening back pain that is radiating to LLE with weakness. She requested appt with TC. Appt made and pt CHUCK.

## 2023-07-19 ENCOUNTER — OFFICE VISIT (OUTPATIENT)
Dept: ENT CLINIC | Age: 48
End: 2023-07-19
Payer: MEDICAID

## 2023-07-19 ENCOUNTER — PROCEDURE VISIT (OUTPATIENT)
Dept: ENT CLINIC | Age: 48
End: 2023-07-19
Payer: MEDICAID

## 2023-07-19 VITALS
BODY MASS INDEX: 30.53 KG/M2 | HEIGHT: 66 IN | SYSTOLIC BLOOD PRESSURE: 132 MMHG | DIASTOLIC BLOOD PRESSURE: 78 MMHG | WEIGHT: 190 LBS

## 2023-07-19 DIAGNOSIS — H90.3 ASYMMETRICAL SENSORINEURAL HEARING LOSS: Primary | ICD-10-CM

## 2023-07-19 DIAGNOSIS — H90.42 SENSORINEURAL HEARING LOSS (SNHL) OF LEFT EAR WITH UNRESTRICTED HEARING OF RIGHT EAR: ICD-10-CM

## 2023-07-19 DIAGNOSIS — H93.8X2 SENSATION OF FULLNESS IN LEFT EAR: ICD-10-CM

## 2023-07-19 DIAGNOSIS — H92.02 LEFT EAR PAIN: Primary | ICD-10-CM

## 2023-07-19 PROCEDURE — 92567 TYMPANOMETRY: CPT | Performed by: AUDIOLOGIST

## 2023-07-19 PROCEDURE — 92557 COMPREHENSIVE HEARING TEST: CPT | Performed by: AUDIOLOGIST

## 2023-07-19 PROCEDURE — 99203 OFFICE O/P NEW LOW 30 MIN: CPT | Performed by: PHYSICIAN ASSISTANT

## 2023-07-19 ASSESSMENT — ENCOUNTER SYMPTOMS
TROUBLE SWALLOWING: 0
FACIAL SWELLING: 0
SINUS PAIN: 0
SORE THROAT: 0
SINUS PRESSURE: 0
EYE DISCHARGE: 0
RHINORRHEA: 0
EYE PAIN: 0
VOICE CHANGE: 0

## 2023-07-19 NOTE — ASSESSMENT & PLAN NOTE
Asymmetrical sensorineural hearing loss of the left ear-confirmed by audiology screening  Plan: I recommended MRI of the left IAC for further evaluation. If this is negative, would recommend a hearing aid trial.  I will call her the MRI results once this has been completed.

## 2023-07-19 NOTE — PROGRESS NOTES
History   Carmen Lerma is a 52 y.o. female who presented to the clinic this date with complaints of left aural fullness and ear pain. She reported onset in September of last year. She noted symptoms are intermittent. She also reported decreased hearing in that ear. She reported bilateral tinnitus. She denied dizziness. She has history of CAPD. She has history of neck surgery in April. Following surgery she had a swollen nodule in her neck. Summary   Tympanometry consistent with normal TM mobility bilaterally. Pure tone testing indicates normal hearing in the right ear and severe rising to mild SNHL in the left ear. Word recognition scores were worse than expected based on pure tone results, possible due to CAPD. Based on degree of hearing loss, a hearing aid trial is recommended in the left ear. Results   Otoscopy:   Right: Clear EAC/Normal TM  Left: Clear EAC/Normal TM    Audiometry:   Right: Hearing WNL    Left: Severe rising SNHL         Tympanometry:    Right: Type A  Left: Type A    Plan   Results of today's testing were discussed with Ms. Cindy Abdullahi and the following recommendations were made: Follow up with ENT as scheduled. Monitor hearing yearly, sooner with changes. Hearing aid evaluation for the left ear as desired. Hearing protection as warranted.         Audiogram and Acoustic Immittance

## 2023-07-20 ENCOUNTER — LAB (OUTPATIENT)
Dept: LAB | Facility: HOSPITAL | Age: 48
End: 2023-07-20
Payer: COMMERCIAL

## 2023-07-20 PROBLEM — K90.9 INTESTINAL MALABSORPTION: Status: ACTIVE | Noted: 2023-07-20

## 2023-07-20 PROCEDURE — 82306 VITAMIN D 25 HYDROXY: CPT | Performed by: NURSE PRACTITIONER

## 2023-07-20 PROCEDURE — 85007 BL SMEAR W/DIFF WBC COUNT: CPT | Performed by: NURSE PRACTITIONER

## 2023-07-20 PROCEDURE — 82784 ASSAY IGA/IGD/IGG/IGM EACH: CPT | Performed by: NURSE PRACTITIONER

## 2023-07-20 PROCEDURE — 84439 ASSAY OF FREE THYROXINE: CPT | Performed by: NURSE PRACTITIONER

## 2023-07-20 PROCEDURE — 82607 VITAMIN B-12: CPT | Performed by: NURSE PRACTITIONER

## 2023-07-20 PROCEDURE — 86258 DGP ANTIBODY EACH IG CLASS: CPT | Performed by: NURSE PRACTITIONER

## 2023-07-20 PROCEDURE — 86364 TISS TRNSGLTMNASE EA IG CLAS: CPT | Performed by: NURSE PRACTITIONER

## 2023-07-20 PROCEDURE — 80050 GENERAL HEALTH PANEL: CPT | Performed by: NURSE PRACTITIONER

## 2023-07-21 ENCOUNTER — TRANSCRIBE ORDERS (OUTPATIENT)
Dept: ADMINISTRATIVE | Facility: HOSPITAL | Age: 48
End: 2023-07-21
Payer: COMMERCIAL

## 2023-07-21 DIAGNOSIS — H90.3 ASYMMETRICAL SENSORINEURAL HEARING LOSS: Primary | ICD-10-CM

## 2023-07-24 ENCOUNTER — OFFICE VISIT (OUTPATIENT)
Dept: NEUROSURGERY | Age: 48
End: 2023-07-24

## 2023-07-24 VITALS
WEIGHT: 190 LBS | DIASTOLIC BLOOD PRESSURE: 72 MMHG | OXYGEN SATURATION: 98 % | SYSTOLIC BLOOD PRESSURE: 112 MMHG | BODY MASS INDEX: 30.53 KG/M2 | HEART RATE: 84 BPM | HEIGHT: 66 IN | RESPIRATION RATE: 18 BRPM

## 2023-07-24 DIAGNOSIS — M54.41 CHRONIC MIDLINE LOW BACK PAIN WITH BILATERAL SCIATICA: ICD-10-CM

## 2023-07-24 DIAGNOSIS — M51.36 DDD (DEGENERATIVE DISC DISEASE), LUMBAR: ICD-10-CM

## 2023-07-24 DIAGNOSIS — G89.29 CHRONIC MIDLINE LOW BACK PAIN WITH BILATERAL SCIATICA: ICD-10-CM

## 2023-07-24 DIAGNOSIS — M54.42 ACUTE MIDLINE LOW BACK PAIN WITH LEFT-SIDED SCIATICA: Primary | ICD-10-CM

## 2023-07-24 DIAGNOSIS — M54.42 CHRONIC MIDLINE LOW BACK PAIN WITH BILATERAL SCIATICA: ICD-10-CM

## 2023-07-24 PROCEDURE — 99024 POSTOP FOLLOW-UP VISIT: CPT | Performed by: NURSE PRACTITIONER

## 2023-07-24 ASSESSMENT — ENCOUNTER SYMPTOMS
GASTROINTESTINAL NEGATIVE: 1
RESPIRATORY NEGATIVE: 1
EYES NEGATIVE: 1
BACK PAIN: 1

## 2023-07-24 NOTE — PROGRESS NOTES
[facility] Neurosurgery  Office Visit      Chief Complaint   Patient presents with    Follow-up     Patient states she is having worsening back pain w/ LLE weakness. She states she noticed her symptoms worsening about 1 month ago. She states it is affecting her daily living activities and sleep. Numbness     Patient denies symptoms of numbness/tingling in her BLE. HISTORY OF PRESENT ILLNESS:      Mei Polk is a 52 y.o. female who underwent an ACDF C6-7 for cervical spondylosis with radiculopathy on 5/10/2023 and now she is 2 months out from her surgery. Prior to surgery she complained of neck and bilateral shoulder pain. The pain radiated into the posterior lateral arm into the forearm and left hand. She complains of numbness in the same distribution. She did have fine motor impairment and dropping objects. Today Ms. Aaron Mahmood returns to clinic for a sooner appointment stating that she is now having low back pain with left lower extremity pain that started 15+ years ago but states she just cannot handle it any longer. She has low back pain, pain travels into the BLE L>R and into the hip, lateral thigh, lateral leg. Denies any numbness or paresthesias. She states that everything makes her pain worse. She complains of more low back pain. Reports about 50% back and 50% leg. Has recent lumbar films from [facility] back in [facility] 2023. She states she still has neck pain, \"it's not the same kind of pain\". The radicular pains have resolved. Does still have some shoulder pains. She is a smoker.            Past Medical History:   Diagnosis Date    Depression     Fibromyalgia     Hx of Graves' disease     Thyroid disease        Past Surgical History:   Procedure Laterality Date    CARPAL TUNNEL RELEASE      CERVICAL FUSION N/A 5/10/2023    ACDF C6-7 performed by Teo Hough DO at [address] OF UTERUS      x

## 2023-07-26 ENCOUNTER — TRANSCRIBE ORDERS (OUTPATIENT)
Dept: ADMINISTRATIVE | Facility: HOSPITAL | Age: 48
End: 2023-07-26
Payer: COMMERCIAL

## 2023-07-26 DIAGNOSIS — H90.3 ASYMMETRICAL SENSORINEURAL HEARING LOSS: Primary | ICD-10-CM

## 2023-07-27 DIAGNOSIS — H90.3 ASYMMETRICAL SENSORINEURAL HEARING LOSS: Primary | ICD-10-CM

## 2023-07-31 ENCOUNTER — TRANSCRIBE ORDERS (OUTPATIENT)
Dept: ADMINISTRATIVE | Facility: HOSPITAL | Age: 48
End: 2023-07-31
Payer: COMMERCIAL

## 2023-07-31 DIAGNOSIS — H90.3 ASYMMETRICAL SENSORINEURAL HEARING LOSS: Primary | ICD-10-CM

## 2023-07-31 NOTE — PROGRESS NOTES
Gnosticism requested orders to be with and without contrast of the brain and with and without contrast of the IAC separate.     Electronically signed by Gatito Garrett PA-C on 7/31/23 at 1:05 PM CDT

## 2023-08-03 ENCOUNTER — TRANSCRIBE ORDERS (OUTPATIENT)
Dept: ADMINISTRATIVE | Facility: HOSPITAL | Age: 48
End: 2023-08-03
Payer: COMMERCIAL

## 2023-08-03 DIAGNOSIS — Z12.31 ENCOUNTER FOR SCREENING MAMMOGRAM FOR MALIGNANT NEOPLASM OF BREAST: Primary | ICD-10-CM

## 2023-08-15 ENCOUNTER — HOSPITAL ENCOUNTER (OUTPATIENT)
Dept: MAMMOGRAPHY | Facility: HOSPITAL | Age: 48
Discharge: HOME | End: 2023-08-15
Admitting: NURSE PRACTITIONER
Payer: COMMERCIAL

## 2023-08-15 DIAGNOSIS — Z12.31 ENCOUNTER FOR SCREENING MAMMOGRAM FOR MALIGNANT NEOPLASM OF BREAST: ICD-10-CM

## 2023-08-15 PROCEDURE — 77067 SCR MAMMO BI INCL CAD: CPT

## 2023-08-15 PROCEDURE — 77063 BREAST TOMOSYNTHESIS BI: CPT

## 2023-08-16 DIAGNOSIS — Z80.3 FAMILY HISTORY OF BREAST CANCER: Primary | ICD-10-CM

## 2023-08-24 ENCOUNTER — OFFICE VISIT (OUTPATIENT)
Dept: ENT CLINIC | Age: 48
End: 2023-08-24
Payer: MEDICAID

## 2023-08-24 VITALS
HEIGHT: 66 IN | WEIGHT: 190 LBS | SYSTOLIC BLOOD PRESSURE: 126 MMHG | BODY MASS INDEX: 30.53 KG/M2 | DIASTOLIC BLOOD PRESSURE: 80 MMHG

## 2023-08-24 DIAGNOSIS — H69.92 NEGATIVE MIDDLE EAR PRESSURE OF LEFT EAR: ICD-10-CM

## 2023-08-24 DIAGNOSIS — H65.91 MEE (MIDDLE EAR EFFUSION), RIGHT: Primary | ICD-10-CM

## 2023-08-24 PROCEDURE — 99213 OFFICE O/P EST LOW 20 MIN: CPT | Performed by: PHYSICIAN ASSISTANT

## 2023-08-24 RX ORDER — CEFUROXIME AXETIL 500 MG/1
500 TABLET ORAL 2 TIMES DAILY
Qty: 20 TABLET | Refills: 0 | Status: SHIPPED | OUTPATIENT
Start: 2023-08-24 | End: 2023-09-03

## 2023-08-24 RX ORDER — PSEUDOEPHEDRINE HCL 30 MG
30 TABLET ORAL 3 TIMES DAILY
Qty: 30 TABLET | Refills: 2 | Status: SHIPPED | OUTPATIENT
Start: 2023-08-24 | End: 2024-08-23

## 2023-08-24 RX ORDER — PREDNISONE 20 MG/1
TABLET ORAL
Qty: 20 TABLET | Refills: 0 | Status: SHIPPED | OUTPATIENT
Start: 2023-08-24

## 2023-08-24 ASSESSMENT — ENCOUNTER SYMPTOMS
RHINORRHEA: 0
EYE DISCHARGE: 0
SINUS PAIN: 0
TROUBLE SWALLOWING: 0
SINUS PRESSURE: 0
SORE THROAT: 0
EYE PAIN: 0
FACIAL SWELLING: 0
VOICE CHANGE: 0

## 2023-08-24 NOTE — ASSESSMENT & PLAN NOTE
Right middle ear effusion with evidence of negative middle ear pressure to the left side  Plan: I will place the patient on prednisone as well as Ceftin and Sudafed. Patient is to follow-up in 2 to 3 weeks for reevaluation.

## 2023-08-24 NOTE — PROGRESS NOTES
canal clear Otoscopy TM: TM's sluggish and air/fluid level Left Ear: External: external ears normal Otoscopy Ear Canal: canal clear Otoscopy TM: TM's retracted TM's total  Hearing: Lau R  Nose: nares normal and septum midline  Neck: supple and adenopathy none palpable  Thyroid: normal  Oral exam demonstrated the tongue to be midline with no abnormalities to the posterior pharynx. Patient was noted with no sinus tenderness to percussion over the frontal and maxillary sinuses bilaterally. Assessment & Plan:    Problem List Items Addressed This Visit       TAI (middle ear effusion), right - Primary     Right middle ear effusion with evidence of negative middle ear pressure to the left side  Plan: I will place the patient on prednisone as well as Ceftin and Sudafed. Patient is to follow-up in 2 to 3 weeks for reevaluation. Negative middle ear pressure of left ear       No orders of the defined types were placed in this encounter. Orders Placed This Encounter   Medications    predniSONE (DELTASONE) 20 MG tablet     Sig: Take 1 tab po bid x 6 days, decrease to 1 tab po q d x 4 days, then decrease to half tablet po q d x 2 days. Dispense:  20 tablet     Refill:  0    pseudoephedrine (DECONGESTANT) 30 MG tablet     Sig: Take 1 tablet by mouth 3 times daily     Dispense:  30 tablet     Refill:  2    cefUROXime (CEFTIN) 500 MG tablet     Sig: Take 1 tablet by mouth 2 times daily for 10 days     Dispense:  20 tablet     Refill:  0     Electronically signed by Jo Hernandez PA-C on 8/24/23 at 4:20 PM CDT          Please note that this chart was generated using dragon dictation software. Although every effort was made to ensure the accuracy of this automated transcription, some errors in transcription may have occurred.

## 2023-08-29 ENCOUNTER — HOSPITAL ENCOUNTER (OUTPATIENT)
Dept: MRI IMAGING | Facility: HOSPITAL | Age: 48
Discharge: HOME | End: 2023-08-29
Payer: COMMERCIAL

## 2023-08-29 DIAGNOSIS — H90.3 ASYMMETRICAL SENSORINEURAL HEARING LOSS: ICD-10-CM

## 2023-08-29 PROCEDURE — A9577 INJ MULTIHANCE: HCPCS | Performed by: PHYSICIAN ASSISTANT

## 2023-08-29 PROCEDURE — 70553 MRI BRAIN STEM W/O & W/DYE: CPT

## 2023-08-29 PROCEDURE — 0 GADOBENATE DIMEGLUMINE 529 MG/ML SOLUTION: Performed by: PHYSICIAN ASSISTANT

## 2023-08-29 RX ADMIN — GADOBENATE DIMEGLUMINE 15 ML: 529 INJECTION, SOLUTION INTRAVENOUS at 14:41

## 2023-08-30 ENCOUNTER — TELEPHONE (OUTPATIENT)
Dept: ENT CLINIC | Age: 48
End: 2023-08-30

## 2023-08-30 NOTE — TELEPHONE ENCOUNTER
MRI of the IAC was completed due to asymmetrical hearing loss. This was negative for any evidence of acoustic neuroma or any abnormalities. I advised the patient that we will continue the medication for the right middle ear effusion. She is to follow-up in 2 to 3 weeks for reevaluation of the right ear. Patient's hearing improved to the right ear, will repeat audiology screening. Patient was reminded to call if she has any questions or problems.       Electronically signed by Jessika Hollingsworth PA-C on 8/30/23 at 6:36 PM CDT

## 2023-08-31 ENCOUNTER — HOSPITAL ENCOUNTER (OUTPATIENT)
Dept: GENERAL RADIOLOGY | Age: 48
Discharge: HOME | End: 2023-08-31
Payer: MEDICAID

## 2023-08-31 ENCOUNTER — OFFICE VISIT (OUTPATIENT)
Dept: NEUROSURGERY | Age: 48
End: 2023-08-31

## 2023-08-31 VITALS
WEIGHT: 190 LBS | HEART RATE: 66 BPM | SYSTOLIC BLOOD PRESSURE: 104 MMHG | DIASTOLIC BLOOD PRESSURE: 70 MMHG | BODY MASS INDEX: 30.53 KG/M2 | HEIGHT: 66 IN | RESPIRATION RATE: 18 BRPM | OXYGEN SATURATION: 99 %

## 2023-08-31 DIAGNOSIS — M54.42 ACUTE MIDLINE LOW BACK PAIN WITH LEFT-SIDED SCIATICA: ICD-10-CM

## 2023-08-31 DIAGNOSIS — Z98.1 S/P CERVICAL SPINAL FUSION: Primary | ICD-10-CM

## 2023-08-31 DIAGNOSIS — Z98.1 S/P CERVICAL SPINAL FUSION: ICD-10-CM

## 2023-08-31 DIAGNOSIS — M51.36 DDD (DEGENERATIVE DISC DISEASE), LUMBAR: ICD-10-CM

## 2023-08-31 PROCEDURE — 72040 X-RAY EXAM NECK SPINE 2-3 VW: CPT

## 2023-08-31 ASSESSMENT — ENCOUNTER SYMPTOMS
RESPIRATORY NEGATIVE: 1
EYES NEGATIVE: 1
GASTROINTESTINAL NEGATIVE: 1

## 2023-08-31 NOTE — PROGRESS NOTES
[facility] Neurosurgery  Office Visit      Chief Complaint   Patient presents with    Results     XR Cervical Spine (8/31/2023)    Follow-up     3 month ACDF. She states she is still having pain and having trouble with range of motion. She states she is supposed to have neck injections in a couple of weeks at [facility]. Numbness     Patient denies symptoms of numbness/tingling in her BUE. HISTORY OF PRESENT ILLNESS:      Merlinda Gibney is a 50 y.o. female who underwent an ACDF C6-7 for cervical spondylosis with radiculopathy on 5/10/2023 and now she is 3 months out from her surgery. Prior to surgery she complained of neck and bilateral shoulder pain. The pain radiated into the posterior lateral arm into the forearm and left hand. She complains of numbness in the same distribution. She did have fine motor impairment and dropping objects. Today Ms. Susan Kang returns to clinic for routine 3 month post-op follow up. She no longer has the radicular arm pains. She does still have neck pain, however, it is not as severe. She continues to complain of mid low back pain. Back and LLE pain have been present for 15+ years and worsening. She see's pain management at [facility] and states that they told her there was nothing they could do. She is a smoker.            Past Medical History:   Diagnosis Date    Depression     Fibromyalgia     Hx of Graves' disease     Thyroid disease        Past Surgical History:   Procedure Laterality Date    CARPAL TUNNEL RELEASE      CERVICAL FUSION N/A 5/10/2023    ACDF C6-7 performed by Danisha Levin DO at [facility]      x 2    HC INJECTION PROCEDURE FOR SACROILIAC JOINT Right 8/29/2017    SACROILIAC JOINT INJECTION performed by Dick Sanderson at [address] [place] [address] JOINT Right 9/17/2019    SACROILIAC JOINT INJECTION

## 2023-09-12 ENCOUNTER — TELEPHONE (OUTPATIENT)
Dept: GASTROENTEROLOGY | Age: 48
End: 2023-09-12

## 2023-09-12 NOTE — TELEPHONE ENCOUNTER
Oumou requests that clinic return her call. The best time to reach her is Anytime. Radha Santiago needs to reschedule her colon screening that is on 9/15/23. Pt doesn't have someone to bring her that day. Thank you.

## 2023-09-13 NOTE — TELEPHONE ENCOUNTER
I called patient to reschedule their colonoscopy but had to leave a msg; I went ahead and cancelled it in Excel and made a note in the appt desk.

## 2023-09-18 ENCOUNTER — OFFICE VISIT (OUTPATIENT)
Dept: ENT CLINIC | Age: 48
End: 2023-09-18
Payer: MEDICAID

## 2023-09-18 VITALS
BODY MASS INDEX: 31.18 KG/M2 | SYSTOLIC BLOOD PRESSURE: 112 MMHG | WEIGHT: 194 LBS | DIASTOLIC BLOOD PRESSURE: 70 MMHG | HEIGHT: 66 IN

## 2023-09-18 DIAGNOSIS — H65.91 MEE (MIDDLE EAR EFFUSION), RIGHT: ICD-10-CM

## 2023-09-18 DIAGNOSIS — H90.42 SENSORINEURAL HEARING LOSS (SNHL) OF LEFT EAR WITH UNRESTRICTED HEARING OF RIGHT EAR: Primary | ICD-10-CM

## 2023-09-18 PROCEDURE — 99213 OFFICE O/P EST LOW 20 MIN: CPT | Performed by: PHYSICIAN ASSISTANT

## 2023-09-18 NOTE — PROGRESS NOTES
Zeeshan is a pleasant 51-year-old  female that presents for a 3-week follow-up after treatment for right middle ear effusion. Patient initially presented with asymmetrical hearing loss of the left ear. At that time she was noted with severe rising sensorineural hearing loss of the left ear with normal hearing with the right ear. Tympanogram was type A bilaterally. This was noted on 7/19/2023. She was sent for MRI of the IAC that was negative for acoustic neuroma or any abnormalities. In the meantime she developed a middle ear effusion to the right ear that has been recently treated. Currently she reports that the right ear feels back to baseline. She now complains of a constant aural fullness to the left ear. Physical examination with the microscope revealed the patient to have normal-appearing TMs and canals bilaterally. Neck exam demonstrated no lymphadenopathy or thyromegaly. Oral exam was unrevealing. Impression: Resolved right middle ear effusion, sensorineural hearing loss of the left ear with normal hearing on the right    Plan: Due to the severity of her sensorineural hearing loss to the left ear, I have recommended the patient to have a hearing aid trial.  She was advised to call if she has any questions or problems. At this point she is follow-up with us on an as needed basis.       Electronically signed by Guillermo Mcintosh PA-C on 9/18/23 at 1:27 PM CDT

## 2023-09-25 ENCOUNTER — LAB (OUTPATIENT)
Dept: LAB | Facility: HOSPITAL | Age: 48
End: 2023-09-25
Payer: COMMERCIAL

## 2023-09-25 ENCOUNTER — OFFICE VISIT (OUTPATIENT)
Dept: ENDOCRINOLOGY | Facility: CLINIC | Age: 48
End: 2023-09-25

## 2023-09-25 VITALS
BODY MASS INDEX: 32.8 KG/M2 | OXYGEN SATURATION: 97 % | HEIGHT: 65 IN | WEIGHT: 196.9 LBS | DIASTOLIC BLOOD PRESSURE: 80 MMHG | SYSTOLIC BLOOD PRESSURE: 128 MMHG | HEART RATE: 79 BPM

## 2023-09-25 DIAGNOSIS — E89.0 POSTABLATIVE HYPOTHYROIDISM: Primary | ICD-10-CM

## 2023-09-25 DIAGNOSIS — E89.0 POSTABLATIVE HYPOTHYROIDISM: ICD-10-CM

## 2023-09-25 DIAGNOSIS — E55.9 VITAMIN D DEFICIENCY: ICD-10-CM

## 2023-09-25 LAB
T4 FREE SERPL-MCNC: 1.2 NG/DL (ref 0.93–1.7)
TSH SERPL DL<=0.05 MIU/L-ACNC: 19.1 UIU/ML (ref 0.27–4.2)

## 2023-09-25 PROCEDURE — 84439 ASSAY OF FREE THYROXINE: CPT

## 2023-09-25 PROCEDURE — 99214 OFFICE O/P EST MOD 30 MIN: CPT | Performed by: NURSE PRACTITIONER

## 2023-09-25 PROCEDURE — 1159F MED LIST DOCD IN RCRD: CPT | Performed by: NURSE PRACTITIONER

## 2023-09-25 PROCEDURE — 1160F RVW MEDS BY RX/DR IN RCRD: CPT | Performed by: NURSE PRACTITIONER

## 2023-09-25 PROCEDURE — 84443 ASSAY THYROID STIM HORMONE: CPT

## 2023-09-25 PROCEDURE — 36415 COLL VENOUS BLD VENIPUNCTURE: CPT

## 2023-09-25 NOTE — PROGRESS NOTES
"Chief Complaint  Thyroid Problem    Subjective          Kimberly Morgan presents to [facility]  History of Present Illness        In office visit     Referring provider KATY Kenny     Chief Complaint   Patient presents with    Thyroid Problem       HPI    48 year old female presents for follow up       Reason -- post ablation hypothyroidism     BROWN due to Graves --- 2013 or 2014        Duration---started in 2005 with hypothyroidism then developed hyperthyroidism         Timing constant     Quality not controlled     Severity moderate           Vitamin d     Taking supplement     Review of Systems - General ROS: positive for  - fatigue          Objective   Vital Signs:   /80   Pulse 79   Ht 165.1 cm (65\")   Wt 89.3 kg (196 lb 14.4 oz)   SpO2 97%   BMI 32.77 kg/m²     Physical Exam  Constitutional:       Appearance: Normal appearance.   Neck:      Comments: Thyroid surgically absent   Cardiovascular:      Rate and Rhythm: Regular rhythm.      Heart sounds: Normal heart sounds.   Musculoskeletal:      Cervical back: Normal range of motion.   Neurological:      Mental Status: She is alert.      Result Review :     Common labs          7/20/2023    16:05   Common Labs   Glucose 88    BUN 13    Creatinine 0.89    Sodium 136    Potassium 3.9    Chloride 98    Calcium 9.5    Albumin 4.0    Total Bilirubin 0.2    Alkaline Phosphatase 75    AST (SGOT) 13    ALT (SGPT) 9    WBC 8.23    Hemoglobin 14.1    Hematocrit 42.7    Platelets 301              Assessment and Plan    Diagnoses and all orders for this visit:    1. Postablative hypothyroidism (Primary)  -     TSH; Future  -     T4, Free; Future  -     T4, Free; Future  -     TSH; Future  -     TSH; Future  -     T4, Free; Future    2. Vitamin D deficiency                    Thyroid Health      Lab Results   Component Value Date    TSH 66.600 (H) 07/20/2023         Dec. 2022    TSH - 55    Now on levothyroxine " 150  mcg daily     Taking cytomel 5 mcg once daily         Labs today ---       Medication must me taken on an empty stomach at least one hour before food, drink and other medications. Only take with water. If taking vitamins or antiacids needs to be 4 hours before or after.      Celiac negative         Bone Health       Vitamin d     Taking vitamin d     Component      Latest Ref Rng 7/20/2023   25 Hydroxy, Vitamin D      30.0 - 100.0 ng/ml 29.7 (L)       (L) Low    B12 def      Lab Results   Component Value Date    [id number] 07/20/2023                   Follow Up   Return in about 3 months (around 12/25/2023) for Recheck.  Patient was given instructions and counseling regarding her condition or for health maintenance advice. Please see specific information pulled into the AVS if appropriate.         This document has been electronically signed by KATY Rubio on September 25, 2023 13:39 CDT.  \

## 2023-09-26 ENCOUNTER — DOCUMENTATION (OUTPATIENT)
Dept: ENDOCRINOLOGY | Facility: CLINIC | Age: 48
End: 2023-09-26
Payer: COMMERCIAL

## 2023-09-26 ENCOUNTER — TELEPHONE (OUTPATIENT)
Dept: ENDOCRINOLOGY | Facility: CLINIC | Age: 48
End: 2023-09-26
Payer: COMMERCIAL

## 2023-09-26 RX ORDER — LEVOTHYROXINE SODIUM 175 UG/ML
175 SOLUTION ORAL DAILY
Qty: 30 ML | Refills: 5 | Status: SHIPPED | OUTPATIENT
Start: 2023-09-26

## 2023-09-26 NOTE — PROGRESS NOTES
PA FOR TIROSINT SOLUTION SUBMITTED VIA [facility].    TIROSINT SOLUTION APPROVED FROM 9/26/23 TO 9/25/24    SENT TO MED REC

## 2023-09-26 NOTE — TELEPHONE ENCOUNTER
----- Message from KATY Rubio sent at 9/26/2023 10:03 AM CDT -----  Thyroid is now 19 , was 66 , I called in liquid Tirosint 175 mcg daily

## 2023-09-28 ENCOUNTER — TELEPHONE (OUTPATIENT)
Dept: ENDOCRINOLOGY | Facility: CLINIC | Age: 48
End: 2023-09-28
Payer: COMMERCIAL

## 2023-09-28 NOTE — TELEPHONE ENCOUNTER
[facility] called requesting clarification on Tirosint solution rx. They said all over meds are tablets and wanted to know if solution should have been sent?

## 2023-10-05 ENCOUNTER — TELEPHONE (OUTPATIENT)
Dept: PSYCHIATRY | Age: 48
End: 2023-10-05

## 2023-10-05 NOTE — TELEPHONE ENCOUNTER
Called pt to schedule an appt from a referral.    Pt stated that she started a new job yesterday 10/04/23 and she works 7-4. Pt stated that she will talk to her job and see what she can do, and then give out office a call back when she can schedule an appt.      Electronically signed by Joe Felix MA on 10/5/2023 at 10:13 AM

## 2023-10-16 ENCOUNTER — TELEPHONE (OUTPATIENT)
Dept: PSYCHIATRY | Age: 48
End: 2023-10-16

## 2023-10-16 NOTE — TELEPHONE ENCOUNTER
Called pt to schedule an appt from a referral.    No answer and LVM.       Electronically signed by Jairo Gu MA on 10/16/2023 at 3:44 PM

## 2023-10-25 ENCOUNTER — TELEPHONE (OUTPATIENT)
Dept: PSYCHIATRY | Age: 48
End: 2023-10-25

## 2023-10-25 NOTE — TELEPHONE ENCOUNTER
Called pt to schedule an appt from a referral    No answer and LVM  This is the third attempt to reach the pt  It is up to the pt to call and schedule an appt    Sending back to referring provider.     Electronically signed by Aaron Butler MA on 10/25/2023 at 12:31 PM

## 2023-10-27 ENCOUNTER — TELEPHONE (OUTPATIENT)
Dept: PSYCHIATRY | Age: 48
End: 2023-10-27

## 2023-10-27 NOTE — TELEPHONE ENCOUNTER
Notified the referring provider that our office was unable to contact this pt to schedule an appt. It is up to the pt to call and schedule an appt if she would like our services.     Electronically signed by Danny Monzon MA on 10/27/2023 at 4:35 PM

## 2024-03-13 ENCOUNTER — OFFICE VISIT (OUTPATIENT)
Dept: ENT CLINIC | Age: 49
End: 2024-03-13
Payer: MEDICAID

## 2024-03-13 VITALS
WEIGHT: 205 LBS | DIASTOLIC BLOOD PRESSURE: 74 MMHG | BODY MASS INDEX: 32.95 KG/M2 | SYSTOLIC BLOOD PRESSURE: 126 MMHG | HEIGHT: 66 IN

## 2024-03-13 DIAGNOSIS — H90.5 PERCEPTIVE HEARING LOSS, ONE SIDE: Primary | ICD-10-CM

## 2024-03-13 PROCEDURE — 99213 OFFICE O/P EST LOW 20 MIN: CPT | Performed by: PHYSICIAN ASSISTANT

## 2024-03-13 NOTE — PROGRESS NOTES
Dana is a pleasant 48-year-old  female that presents for reevaluation of the right ear.  She reports that she still feels a fullness sensation and feels like she has got recurrent fluid.  She also has noticed muffled hearing that now seems to be more equal compared to her previous encounter.  Her previous audiology study back in July of last year was reviewed and demonstrated significant sensorineural hearing loss of the left ear with the right ear being unremarkable.      Physical examination with microscope revealed the patient to have no evidence of fluid to the right ear with a normal-appearing TM and canal with normal mobility.  Left ear demonstrated a normal TM and canal with normal ability.  A tympanogram was performed today and demonstrated type A bilaterally.  Neck exam demonstrated no lymphadenopathy or thyromegaly.  Tuning fork exam demonstrated air conduction greater than bone conduction to the right ear with the left ear demonstrating air conduction to be equal to bone conduction.  Lua was noted to remain midline.      Impression: Aural fullness with questionable hearing change of the right ear    Plan: Due to the change that is currently noted as well as tympanogram begin type A, I have recommended setting her up for a repeat audiology screening by [facility]anda for comparison study.  We talked about the need for a hearing aid to the left ear and potentially could be the right depending on the study.  She reports that financially she is having issues trying to pay for it. I advised her that Medicaid does offer assistance for the hearing aids once medically indicated.  I will talk to the patient after the repeat audiology study for further recommendations.      Electronically signed by KAIT ROSE PA-C on 3/13/24 at 10:20 AM CURLYT

## 2024-06-10 ENCOUNTER — OFFICE VISIT (OUTPATIENT)
Dept: ENT CLINIC | Age: 49
End: 2024-06-10
Payer: MEDICAID

## 2024-06-10 VITALS
BODY MASS INDEX: 31.5 KG/M2 | HEIGHT: 66 IN | DIASTOLIC BLOOD PRESSURE: 78 MMHG | SYSTOLIC BLOOD PRESSURE: 118 MMHG | WEIGHT: 196 LBS

## 2024-06-10 DIAGNOSIS — H65.91 MEE (MIDDLE EAR EFFUSION), RIGHT: Primary | ICD-10-CM

## 2024-06-10 PROCEDURE — 99213 OFFICE O/P EST LOW 20 MIN: CPT | Performed by: PHYSICIAN ASSISTANT

## 2024-06-10 RX ORDER — SERDEXMETHYLPHENIDATE AND DEXMETHYLPHENIDATE 7.8; 39.2 MG/1; MG/1
1 CAPSULE ORAL DAILY
COMMUNITY
Start: 2024-03-27

## 2024-06-10 RX ORDER — ESOMEPRAZOLE MAGNESIUM 40 MG/1
CAPSULE, DELAYED RELEASE ORAL
COMMUNITY
Start: 2024-06-01

## 2024-06-10 RX ORDER — PREDNISONE 20 MG/1
TABLET ORAL
Qty: 20 TABLET | Refills: 0 | Status: SHIPPED | OUTPATIENT
Start: 2024-06-10

## 2024-06-10 RX ORDER — LEVOTHYROXINE SODIUM 175 UG/ML
SOLUTION ORAL
COMMUNITY
Start: 2024-06-04

## 2024-06-10 RX ORDER — ECHINACEA PURPUREA EXTRACT 125 MG
2 TABLET ORAL 2 TIMES DAILY
Qty: 1 EACH | Refills: 3 | Status: SHIPPED | OUTPATIENT
Start: 2024-06-10

## 2024-06-10 RX ORDER — CLINDAMYCIN HYDROCHLORIDE 300 MG/1
300 CAPSULE ORAL 3 TIMES DAILY
Qty: 30 CAPSULE | Refills: 0 | Status: SHIPPED | OUTPATIENT
Start: 2024-06-10 | End: 2024-06-20

## 2024-06-10 NOTE — PROGRESS NOTES
Oumou is a pleasant 48-year-old  female that presents for evaluation of the right ear due to recurrent popping sounds and nonpulsatile tinnitus to the right ear.  She reports that over the last couple weeks she believes that her fluid has returned.  She denies any drainage from the canal or any issues with dizziness.      Physical examination revealed the patient to have a right middle ear effusion that was partial of 50%.  The fluid was noted be tico color.  I did not appreciate any erythema of the TM with no drainage in the canal.  Left ear demonstrated a retracted TM with no evidence of infection.  Canal was clear.  Neck exam demonstrated no lymphadenopathy or thyromegaly.  Oral exam demonstrated no masses or any abnormalities to the posterior pharynx.  She was noted to have evidence of right TMJ arthralgia to palpation with no evidence of dislocation.      Impression: Recurrent right middle ear effusion, right TMJ arthralgia-incidentally noted today on physical exam    Plan: I will place the patient on prednisone, clinda, and Ocean nasal spray.  She is to follow-up in 2 to 3 weeks for reevaluation.  If her tinnitus does not resolve after resolution of the fluid, we will need to repeat her hearing test.      Electronically signed by KAIT ROSE PA-C on 6/10/24 at 10:17 AM CURLYT

## 2024-08-23 ENCOUNTER — TRANSCRIBE ORDERS (OUTPATIENT)
Dept: ADMINISTRATIVE | Facility: HOSPITAL | Age: 49
End: 2024-08-23
Payer: COMMERCIAL

## 2024-08-23 DIAGNOSIS — Z12.31 ENCOUNTER FOR SCREENING MAMMOGRAM FOR MALIGNANT NEOPLASM OF BREAST: Primary | ICD-10-CM

## 2024-10-31 ENCOUNTER — HOSPITAL ENCOUNTER (OUTPATIENT)
Dept: GENERAL RADIOLOGY | Facility: HOSPITAL | Age: 49
Discharge: HOME | End: 2024-10-31
Admitting: NURSE PRACTITIONER
Payer: COMMERCIAL

## 2024-10-31 ENCOUNTER — TRANSCRIBE ORDERS (OUTPATIENT)
Dept: ADMINISTRATIVE | Facility: HOSPITAL | Age: 49
End: 2024-10-31
Payer: COMMERCIAL

## 2024-10-31 DIAGNOSIS — I82.412 THROMBOSIS OF LEFT FEMORAL VEIN: Primary | ICD-10-CM

## 2024-10-31 DIAGNOSIS — I82.412 ACUTE EMBOLISM AND THROMBOSIS OF LEFT FEMORAL VEIN: Primary | ICD-10-CM

## 2024-10-31 DIAGNOSIS — M54.50 LOW BACK PAIN, UNSPECIFIED BACK PAIN LATERALITY, UNSPECIFIED CHRONICITY, UNSPECIFIED WHETHER SCIATICA PRESENT: ICD-10-CM

## 2024-10-31 DIAGNOSIS — M79.605 PAIN IN LEFT LEG: ICD-10-CM

## 2024-10-31 DIAGNOSIS — M79.605 LEFT LEG PAIN: ICD-10-CM

## 2024-10-31 PROCEDURE — 73590 X-RAY EXAM OF LOWER LEG: CPT

## 2024-10-31 PROCEDURE — 72100 X-RAY EXAM L-S SPINE 2/3 VWS: CPT

## 2024-10-31 PROCEDURE — 72200 X-RAY EXAM SI JOINTS: CPT

## 2024-11-01 ENCOUNTER — HOSPITAL ENCOUNTER (OUTPATIENT)
Dept: ULTRASOUND IMAGING | Facility: HOSPITAL | Age: 49
Discharge: HOME | End: 2024-11-01
Payer: COMMERCIAL

## 2024-11-01 DIAGNOSIS — I82.412 ACUTE EMBOLISM AND THROMBOSIS OF LEFT FEMORAL VEIN: ICD-10-CM

## 2024-11-01 PROCEDURE — 93971 EXTREMITY STUDY: CPT

## 2025-05-12 ENCOUNTER — TRANSCRIBE ORDERS (OUTPATIENT)
Dept: ADMINISTRATIVE | Facility: HOSPITAL | Age: 50
End: 2025-05-12
Payer: COMMERCIAL

## 2025-05-12 DIAGNOSIS — E03.9 HYPOTHYROIDISM, UNSPECIFIED TYPE: Primary | ICD-10-CM

## 2025-05-19 DIAGNOSIS — E89.0 POST-OPERATIVE HYPOTHYROIDISM: Primary | ICD-10-CM

## 2025-05-23 ENCOUNTER — LAB (OUTPATIENT)
Dept: LAB | Facility: HOSPITAL | Age: 50
End: 2025-05-23
Payer: COMMERCIAL

## 2025-05-23 ENCOUNTER — APPOINTMENT (OUTPATIENT)
Dept: ONCOLOGY | Facility: HOSPITAL | Age: 50
End: 2025-05-23
Payer: COMMERCIAL

## 2025-05-23 DIAGNOSIS — E89.0 POST-OPERATIVE HYPOTHYROIDISM: ICD-10-CM

## 2025-05-23 LAB
T4 FREE SERPL-MCNC: 0.24 NG/DL (ref 0.92–1.68)
TSH SERPL DL<=0.05 MIU/L-ACNC: 46.92 UIU/ML (ref 0.27–4.2)

## 2025-05-23 PROCEDURE — 84443 ASSAY THYROID STIM HORMONE: CPT

## 2025-05-23 PROCEDURE — 84439 ASSAY OF FREE THYROXINE: CPT

## 2025-05-23 PROCEDURE — 36415 COLL VENOUS BLD VENIPUNCTURE: CPT

## 2025-05-28 ENCOUNTER — INFUSION (OUTPATIENT)
Dept: ONCOLOGY | Facility: HOSPITAL | Age: 50
End: 2025-05-28
Payer: COMMERCIAL

## 2025-05-28 VITALS
SYSTOLIC BLOOD PRESSURE: 128 MMHG | OXYGEN SATURATION: 98 % | HEIGHT: 65 IN | RESPIRATION RATE: 18 BRPM | WEIGHT: 180 LBS | BODY MASS INDEX: 29.99 KG/M2 | TEMPERATURE: 96.7 F | HEART RATE: 73 BPM | DIASTOLIC BLOOD PRESSURE: 87 MMHG

## 2025-05-28 DIAGNOSIS — E89.0 POST-OPERATIVE HYPOTHYROIDISM: Primary | ICD-10-CM

## 2025-05-28 PROCEDURE — 25010000002: Performed by: STUDENT IN AN ORGANIZED HEALTH CARE EDUCATION/TRAINING PROGRAM

## 2025-05-28 PROCEDURE — 96372 THER/PROPH/DIAG INJ SC/IM: CPT

## 2025-05-28 RX ADMIN — LEVOTHYROXINE SODIUM 250 MCG: 500 INJECTION, POWDER, LYOPHILIZED, FOR SOLUTION INTRAVENOUS at 10:28

## 2025-06-04 ENCOUNTER — LAB (OUTPATIENT)
Dept: LAB | Facility: HOSPITAL | Age: 50
End: 2025-06-04
Payer: COMMERCIAL

## 2025-06-04 ENCOUNTER — INFUSION (OUTPATIENT)
Dept: ONCOLOGY | Facility: HOSPITAL | Age: 50
End: 2025-06-04
Payer: COMMERCIAL

## 2025-06-04 VITALS
HEIGHT: 65 IN | RESPIRATION RATE: 18 BRPM | HEART RATE: 70 BPM | BODY MASS INDEX: 29.82 KG/M2 | DIASTOLIC BLOOD PRESSURE: 93 MMHG | WEIGHT: 179 LBS | SYSTOLIC BLOOD PRESSURE: 134 MMHG | OXYGEN SATURATION: 98 % | TEMPERATURE: 97.6 F

## 2025-06-04 DIAGNOSIS — E89.0 POST-OPERATIVE HYPOTHYROIDISM: Primary | ICD-10-CM

## 2025-06-04 DIAGNOSIS — E89.0 POST-OPERATIVE HYPOTHYROIDISM: ICD-10-CM

## 2025-06-04 LAB — T4 FREE SERPL-MCNC: 0.55 NG/DL (ref 0.92–1.68)

## 2025-06-04 PROCEDURE — 84439 ASSAY OF FREE THYROXINE: CPT

## 2025-06-04 PROCEDURE — 36415 COLL VENOUS BLD VENIPUNCTURE: CPT

## 2025-06-04 PROCEDURE — 25010000002: Performed by: STUDENT IN AN ORGANIZED HEALTH CARE EDUCATION/TRAINING PROGRAM

## 2025-06-04 PROCEDURE — 96372 THER/PROPH/DIAG INJ SC/IM: CPT

## 2025-06-04 RX ADMIN — LEVOTHYROXINE SODIUM 250 MCG: 500 INJECTION, POWDER, LYOPHILIZED, FOR SOLUTION INTRAVENOUS at 09:29

## 2025-06-11 ENCOUNTER — LAB (OUTPATIENT)
Dept: LAB | Facility: HOSPITAL | Age: 50
End: 2025-06-11
Payer: COMMERCIAL

## 2025-06-11 ENCOUNTER — INFUSION (OUTPATIENT)
Dept: ONCOLOGY | Facility: HOSPITAL | Age: 50
End: 2025-06-11
Payer: COMMERCIAL

## 2025-06-11 VITALS
BODY MASS INDEX: 29.52 KG/M2 | SYSTOLIC BLOOD PRESSURE: 136 MMHG | OXYGEN SATURATION: 98 % | RESPIRATION RATE: 17 BRPM | HEART RATE: 74 BPM | TEMPERATURE: 98.1 F | DIASTOLIC BLOOD PRESSURE: 83 MMHG | WEIGHT: 177.2 LBS | HEIGHT: 65 IN

## 2025-06-11 DIAGNOSIS — E89.0 POST-OPERATIVE HYPOTHYROIDISM: ICD-10-CM

## 2025-06-11 DIAGNOSIS — E89.0 POST-OPERATIVE HYPOTHYROIDISM: Primary | ICD-10-CM

## 2025-06-11 LAB
T4 FREE SERPL-MCNC: 0.75 NG/DL (ref 0.92–1.68)
TSH SERPL DL<=0.05 MIU/L-ACNC: 29.31 UIU/ML (ref 0.27–4.2)

## 2025-06-11 PROCEDURE — 96372 THER/PROPH/DIAG INJ SC/IM: CPT

## 2025-06-11 PROCEDURE — 84439 ASSAY OF FREE THYROXINE: CPT

## 2025-06-11 PROCEDURE — 84443 ASSAY THYROID STIM HORMONE: CPT

## 2025-06-11 PROCEDURE — 25010000002: Performed by: STUDENT IN AN ORGANIZED HEALTH CARE EDUCATION/TRAINING PROGRAM

## 2025-06-11 PROCEDURE — 36415 COLL VENOUS BLD VENIPUNCTURE: CPT

## 2025-06-11 RX ADMIN — LEVOTHYROXINE SODIUM 250 MCG: 500 INJECTION, POWDER, LYOPHILIZED, FOR SOLUTION INTRAVENOUS at 12:55

## 2025-06-18 ENCOUNTER — LAB (OUTPATIENT)
Dept: LAB | Facility: HOSPITAL | Age: 50
End: 2025-06-18
Payer: COMMERCIAL

## 2025-06-18 ENCOUNTER — INFUSION (OUTPATIENT)
Dept: ONCOLOGY | Facility: HOSPITAL | Age: 50
End: 2025-06-18
Payer: COMMERCIAL

## 2025-06-18 VITALS
DIASTOLIC BLOOD PRESSURE: 84 MMHG | OXYGEN SATURATION: 100 % | HEIGHT: 65 IN | BODY MASS INDEX: 29.36 KG/M2 | HEART RATE: 69 BPM | SYSTOLIC BLOOD PRESSURE: 119 MMHG | WEIGHT: 176.2 LBS | TEMPERATURE: 97.6 F | RESPIRATION RATE: 20 BRPM

## 2025-06-18 DIAGNOSIS — E89.0 POST-OPERATIVE HYPOTHYROIDISM: Primary | ICD-10-CM

## 2025-06-18 DIAGNOSIS — E89.0 POST-OPERATIVE HYPOTHYROIDISM: ICD-10-CM

## 2025-06-18 DIAGNOSIS — E03.9 HYPOTHYROIDISM, UNSPECIFIED TYPE: ICD-10-CM

## 2025-06-18 LAB
T4 FREE SERPL-MCNC: 0.86 NG/DL (ref 0.92–1.68)
TSH SERPL DL<=0.05 MIU/L-ACNC: 26.1 UIU/ML (ref 0.27–4.2)

## 2025-06-18 PROCEDURE — 25010000002: Performed by: STUDENT IN AN ORGANIZED HEALTH CARE EDUCATION/TRAINING PROGRAM

## 2025-06-18 PROCEDURE — 36415 COLL VENOUS BLD VENIPUNCTURE: CPT

## 2025-06-18 PROCEDURE — 84439 ASSAY OF FREE THYROXINE: CPT

## 2025-06-18 PROCEDURE — 84443 ASSAY THYROID STIM HORMONE: CPT

## 2025-06-18 PROCEDURE — 96372 THER/PROPH/DIAG INJ SC/IM: CPT

## 2025-06-18 RX ADMIN — LEVOTHYROXINE SODIUM ANHYDROUS 250 MCG: 500 INJECTION, POWDER, LYOPHILIZED, FOR SOLUTION INTRAVENOUS at 10:24

## 2025-06-24 ENCOUNTER — HOSPITAL ENCOUNTER (EMERGENCY)
Facility: HOSPITAL | Age: 50
Discharge: HOME | End: 2025-06-24
Admitting: FAMILY MEDICINE
Payer: COMMERCIAL

## 2025-06-24 ENCOUNTER — APPOINTMENT (OUTPATIENT)
Dept: GENERAL RADIOLOGY | Facility: HOSPITAL | Age: 50
End: 2025-06-24
Payer: COMMERCIAL

## 2025-06-24 VITALS
TEMPERATURE: 98.4 F | RESPIRATION RATE: 18 BRPM | SYSTOLIC BLOOD PRESSURE: 127 MMHG | OXYGEN SATURATION: 98 % | HEIGHT: 65 IN | BODY MASS INDEX: 29.35 KG/M2 | DIASTOLIC BLOOD PRESSURE: 90 MMHG | WEIGHT: 176.15 LBS | HEART RATE: 81 BPM

## 2025-06-24 DIAGNOSIS — R00.2 PALPITATIONS: Primary | ICD-10-CM

## 2025-06-24 LAB
ALBUMIN SERPL-MCNC: 3.7 G/DL (ref 3.5–5.2)
ALBUMIN/GLOB SERPL: 1.3 G/DL
ALP SERPL-CCNC: 50 U/L (ref 39–117)
ALT SERPL W P-5'-P-CCNC: 17 U/L (ref 1–33)
ANION GAP SERPL CALCULATED.3IONS-SCNC: 11 MMOL/L (ref 5–15)
AST SERPL-CCNC: 17 U/L (ref 1–32)
BASOPHILS # BLD AUTO: 0.03 10*3/MM3 (ref 0–0.2)
BASOPHILS NFR BLD AUTO: 0.4 % (ref 0–1.5)
BILIRUB SERPL-MCNC: 0.4 MG/DL (ref 0–1.2)
BUN SERPL-MCNC: 11.5 MG/DL (ref 6–20)
BUN/CREAT SERPL: 16.4 (ref 7–25)
CALCIUM SPEC-SCNC: 8.4 MG/DL (ref 8.6–10.5)
CHLORIDE SERPL-SCNC: 109 MMOL/L (ref 98–107)
CK SERPL-CCNC: 65 U/L (ref 20–180)
CO2 SERPL-SCNC: 22 MMOL/L (ref 22–29)
CREAT SERPL-MCNC: 0.7 MG/DL (ref 0.57–1)
D DIMER PPP FEU-MCNC: 0.3 MCGFEU/ML (ref 0–0.5)
DEPRECATED RDW RBC AUTO: 47.9 FL (ref 37–54)
EGFRCR SERPLBLD CKD-EPI 2021: 106.2 ML/MIN/1.73
EOSINOPHIL # BLD AUTO: 0.04 10*3/MM3 (ref 0–0.4)
EOSINOPHIL NFR BLD AUTO: 0.5 % (ref 0.3–6.2)
ERYTHROCYTE [DISTWIDTH] IN BLOOD BY AUTOMATED COUNT: 14.2 % (ref 12.3–15.4)
GEN 5 1HR TROPONIN T REFLEX: 8 NG/L
GLOBULIN UR ELPH-MCNC: 2.8 GM/DL
GLUCOSE SERPL-MCNC: 98 MG/DL (ref 65–99)
HCT VFR BLD AUTO: 41.1 % (ref 34–46.6)
HGB BLD-MCNC: 13.6 G/DL (ref 12–15.9)
IMM GRANULOCYTES # BLD AUTO: 0.02 10*3/MM3 (ref 0–0.05)
IMM GRANULOCYTES NFR BLD AUTO: 0.2 % (ref 0–0.5)
LYMPHOCYTES # BLD AUTO: 3.3 10*3/MM3 (ref 0.7–3.1)
LYMPHOCYTES NFR BLD AUTO: 41 % (ref 19.6–45.3)
MAGNESIUM SERPL-MCNC: 1.9 MG/DL (ref 1.6–2.6)
MCH RBC QN AUTO: 30.4 PG (ref 26.6–33)
MCHC RBC AUTO-ENTMCNC: 33.1 G/DL (ref 31.5–35.7)
MCV RBC AUTO: 91.7 FL (ref 79–97)
MONOCYTES # BLD AUTO: 0.65 10*3/MM3 (ref 0.1–0.9)
MONOCYTES NFR BLD AUTO: 8.1 % (ref 5–12)
NEUTROPHILS NFR BLD AUTO: 4 10*3/MM3 (ref 1.7–7)
NEUTROPHILS NFR BLD AUTO: 49.8 % (ref 42.7–76)
NRBC BLD AUTO-RTO: 0 /100 WBC (ref 0–0.2)
PLATELET # BLD AUTO: 197 10*3/MM3 (ref 140–450)
PMV BLD AUTO: 11.4 FL (ref 6–12)
POTASSIUM SERPL-SCNC: 3.5 MMOL/L (ref 3.5–5.2)
PROT SERPL-MCNC: 6.5 G/DL (ref 6–8.5)
RBC # BLD AUTO: 4.48 10*6/MM3 (ref 3.77–5.28)
SODIUM SERPL-SCNC: 142 MMOL/L (ref 136–145)
T4 FREE SERPL-MCNC: 1 NG/DL (ref 0.92–1.68)
TROPONIN T NUMERIC DELTA: NORMAL
TROPONIN T SERPL HS-MCNC: <6 NG/L
TSH SERPL DL<=0.05 MIU/L-ACNC: 9.52 UIU/ML (ref 0.27–4.2)
WBC NRBC COR # BLD AUTO: 8.04 10*3/MM3 (ref 3.4–10.8)

## 2025-06-24 PROCEDURE — 85025 COMPLETE CBC W/AUTO DIFF WBC: CPT | Performed by: STUDENT IN AN ORGANIZED HEALTH CARE EDUCATION/TRAINING PROGRAM

## 2025-06-24 PROCEDURE — 93005 ELECTROCARDIOGRAM TRACING: CPT

## 2025-06-24 PROCEDURE — 80053 COMPREHEN METABOLIC PANEL: CPT | Performed by: STUDENT IN AN ORGANIZED HEALTH CARE EDUCATION/TRAINING PROGRAM

## 2025-06-24 PROCEDURE — 85379 FIBRIN DEGRADATION QUANT: CPT | Performed by: STUDENT IN AN ORGANIZED HEALTH CARE EDUCATION/TRAINING PROGRAM

## 2025-06-24 PROCEDURE — 82550 ASSAY OF CK (CPK): CPT | Performed by: STUDENT IN AN ORGANIZED HEALTH CARE EDUCATION/TRAINING PROGRAM

## 2025-06-24 PROCEDURE — 99284 EMERGENCY DEPT VISIT MOD MDM: CPT

## 2025-06-24 PROCEDURE — 25810000003 SODIUM CHLORIDE 0.9 % SOLUTION: Performed by: STUDENT IN AN ORGANIZED HEALTH CARE EDUCATION/TRAINING PROGRAM

## 2025-06-24 PROCEDURE — 83735 ASSAY OF MAGNESIUM: CPT | Performed by: STUDENT IN AN ORGANIZED HEALTH CARE EDUCATION/TRAINING PROGRAM

## 2025-06-24 PROCEDURE — 84443 ASSAY THYROID STIM HORMONE: CPT | Performed by: STUDENT IN AN ORGANIZED HEALTH CARE EDUCATION/TRAINING PROGRAM

## 2025-06-24 PROCEDURE — 36415 COLL VENOUS BLD VENIPUNCTURE: CPT

## 2025-06-24 PROCEDURE — 84439 ASSAY OF FREE THYROXINE: CPT | Performed by: STUDENT IN AN ORGANIZED HEALTH CARE EDUCATION/TRAINING PROGRAM

## 2025-06-24 PROCEDURE — 84484 ASSAY OF TROPONIN QUANT: CPT | Performed by: STUDENT IN AN ORGANIZED HEALTH CARE EDUCATION/TRAINING PROGRAM

## 2025-06-24 PROCEDURE — 71045 X-RAY EXAM CHEST 1 VIEW: CPT

## 2025-06-24 RX ORDER — SODIUM CHLORIDE 0.9 % (FLUSH) 0.9 %
10 SYRINGE (ML) INJECTION AS NEEDED
Status: DISCONTINUED | OUTPATIENT
Start: 2025-06-24 | End: 2025-06-24 | Stop reason: HOSPADM

## 2025-06-24 RX ADMIN — SODIUM CHLORIDE 1000 ML: 9 INJECTION, SOLUTION INTRAVENOUS at 15:03

## 2025-06-24 NOTE — ED PROVIDER NOTES
Subjective   History of Present Illness  Patient states this morning while she was in the shower she began to have palpitations, and feeling like her heart was racing, as well as dizziness and shortness of breath.  She describes the dizziness as a presyncopal type sensation, but she did not have an actual episode of syncope.  During the incident, she denies any chest pain, vomiting, nausea, or abdominal pain.  Patient ignored the symptoms at first, but then on her way to work and the symptoms worsened, prompting her to stop in her PCPs office.  They evaluated her and told her she needed to come to the emergency department because her heart rate was high.  She denies any cardiac or pulmonary history of any kind, but does states she has been receiving levothyroxine injections every Wednesday for the past month.  She is taking oral levothyroxine, but has discontinued those medications since she began injection therapy last month.  She denies any recent injuries, long car rides/plane rides, heat exposure, or caffeine intake prior to symptoms arising.  She does always modify factors for her symptoms, and states she still is feeling like her heart is racing.  She notes having mild shortness of breath at this time as well.    Past medical history is positive for ADD, anxiety, depression, hypothyroidism, and fibromyalgia.        Review of Systems    Past Medical History:   Diagnosis Date    Abnormal Pap smear of cervix     ADD (attention deficit disorder)     Anxiety     Depression     Disease of thyroid gland     Fibromyalgia     Fibromyalgia, primary        No Known Allergies    Past Surgical History:   Procedure Laterality Date    ADENOIDECTOMY      CARPAL TUNNEL RELEASE Bilateral      SECTION      CHOLECYSTECTOMY      DILATATION AND CURETTAGE      KIDNEY STONE SURGERY      NECK SURGERY  2023    SALPINGECTOMY Bilateral     TONSILLECTOMY      WISDOM TOOTH EXTRACTION         Family History   Problem Relation  Age of Onset    Stroke Father     Thyroid disease Father     Hypertension Brother     Ovarian cancer Maternal Grandmother     Colon cancer Maternal Grandmother     Deep vein thrombosis Maternal Grandmother     Diabetes Maternal Grandmother     Breast cancer Paternal Aunt     Thyroid disease Mother     Bipolar disorder Paternal Uncle     Alcohol abuse Paternal Uncle     Uterine cancer Neg Hx        Social History     Socioeconomic History    Marital status:    Tobacco Use    Smoking status: Former     Current packs/day: 0.25     Average packs/day: 0.3 packs/day for 20.0 years (5.0 ttl pk-yrs)     Types: Cigarettes    Smokeless tobacco: Never   Vaping Use    Vaping status: Former    Substances: Nicotine    Devices: Pre-filled or refillable cartridge   Substance and Sexual Activity    Alcohol use: Not Currently    Drug use: No    Sexual activity: Yes     Partners: Male     Birth control/protection: Surgical           Objective   Physical Exam    Procedures           ED Course  ED Course as of 06/24/25 1706   Tue Jun 24, 2025   1453 ECG 12 Lead Syncope [KT]      ED Course User Index  [KT] Blayne Light PA-C                                                       Medical Decision Making  Patient states this morning while she was in the shower she began to have palpitations, and feeling like her heart was racing, as well as dizziness and shortness of breath.  She describes the dizziness as a presyncopal type sensation, but she did not have an actual episode of syncope.  During the incident, she denies any chest pain, vomiting, nausea, or abdominal pain.  Patient ignored the symptoms at first, but then on her way to work and the symptoms worsened, prompting her to stop in her PCPs office.  They evaluated her and told her she needed to come to the emergency department because her heart rate was high.  She denies any cardiac or pulmonary history of any kind, but does states she has been receiving levothyroxine  injections every Wednesday for the past month.  She is taking oral levothyroxine, but has discontinued those medications since she began injection therapy last month.  She denies any recent injuries, long car rides/plane rides, heat exposure, or caffeine intake prior to symptoms arising.  She does always modify factors for her symptoms, and states she still is feeling like her heart is racing.  She notes having mild shortness of breath at this time as well.    Past medical history is positive for ADD, anxiety, depression, hypothyroidism, and fibromyalgia.    DDX: Electrolyte abnormality, dehydration, SVT, arrhythmia, PE, iatrogenic hyperthyroidism.    ECG 12 Lead Syncope   Preliminary Result    Test Reason : Syncope    Blood Pressure :   */*   mmHG    Vent. Rate :  94 BPM     Atrial Rate :  94 BPM       P-R Int : 194 ms          QRS Dur :  74 ms        QT Int : 340 ms       P-R-T Axes :  63   4  49 degrees      QTcB Int : 425 ms        Normal sinus rhythm    Low voltage QRS    Borderline ECG    When compared with ECG of 17-Feb-2021 11:58,    No significant change was found        Referred By:            Confirmed By:       Labs Reviewed  COMPREHENSIVE METABOLIC PANEL - Abnormal; Notable for the following components:     Chloride                      109 (*)                Calcium                       8.4 (*)             All other components within normal limits         Narrative: GFR Categories in Chronic Kidney Disease (CKD)                                              GFR Category          GFR (mL/min/1.73)    Interpretation                  G1                    90 or greater        Normal or high (1)                  G2                    60-89                Mild decrease (1)                  G3a                   45-59                Mild to moderate decrease                  G3b                   30-44                Moderate to severe decrease                  G4                    15-29                 "Severe decrease                  G5                    14 or less           Kidney failure                                    (1)In the absence of evidence of kidney disease, neither GFR category G1 or G2 fulfill the criteria for CKD.                                    eGFR calculation 2021 CKD-EPI creatinine equation, which does not include race as a factor  TSH RFX ON ABNORMAL TO FREE T4 - Abnormal; Notable for the following components:     TSH                           9.520 (*)            All other components within normal limits  CBC WITH AUTO DIFFERENTIAL - Abnormal; Notable for the following components:     Lymphocytes, Absolute         3.30 (*)            All other components within normal limits  D-DIMER, QUANTITATIVE - Normal         Narrative: According to the assay 's published package insert, a normal (<0.50 MCGFEU/mL) D-dimer result in conjunction with a non-high clinical probability assessment, excludes deep vein thrombosis (DVT) and pulmonary embolism (PE) with high sensitivity.                                    D-dimer values increase with age and this can make VTE exclusion of an older population difficult. To address this, the [employer], based on best available evidence and recent guidelines, recommends that clinicians use age-adjusted D-dimer thresholds in patients greater than 50 years of age with: a) a low probability of PE who do not meet all Pulmonary Embolism Rule Out Criteria, or b) in those with intermediate probability of PE.                   The formula for an age-adjusted D-dimer cut-off is \"age/100\".                  For example, a 60 year old patient would have an age-adjusted cut-off of 0.60 MCGFEU/mL and an 80 year old 0.80 MCGFEU/mL.  TROPONIN - Normal         Narrative: High Sensitive Troponin T Reference Range:                  <14.0 ng/L- Negative Female for AMI                  <22.0 ng/L- Negative Male for AMI                  >=14 - Abnormal " Female indicating possible myocardial injury.                  >=22 - Abnormal Male indicating possible myocardial injury.                   Clinicians would have to utilize clinical acumen, EKG, Troponin, and serial changes to determine if it is an Acute Myocardial Infarction or myocardial injury due to an underlying chronic condition.                                       CK - Normal  MAGNESIUM - Normal  T4, FREE - Normal  HIGH SENSITIVITIY TROPONIN T 1HR         Narrative: High Sensitive Troponin T Reference Range:                  <14.0 ng/L- Negative Female for AMI                  <22.0 ng/L- Negative Male for AMI                  >=14 - Abnormal Female indicating possible myocardial injury.                  >=22 - Abnormal Male indicating possible myocardial injury.                   Clinicians would have to utilize clinical acumen, EKG, Troponin, and serial changes to determine if it is an Acute Myocardial Infarction or myocardial injury due to an underlying chronic condition.                                       CBC AND DIFFERENTIAL    XR Chest 1 View   Final Result         1.  No acute cardiopulmonary process.                   This report was signed and finalized on 6/24/2025 3:11 PM by Dr. Ciaran Post MD.            I discussed the findings with patient in the room.  Patient at this time is negative for any acute coronary or pulmonary process.  Patient states that she is improved upon receiving fluids.  She would like to be discharged at this time.  I offered her the opportunity to contact the cardiology department, but she declines at this time, stating that she will just follow-up with her PCP.  She is pleased with the plan overall.  I discussed patient with Dr. Su, who is in agreement with the plan.    Problems Addressed:  Palpitations: complicated acute illness or injury    Amount and/or Complexity of Data Reviewed  Labs: ordered.  Radiology: ordered.  ECG/medicine tests:  Decision-making  details documented in ED Course.    Risk  Prescription drug management.        Final diagnoses:   Palpitations       ED Disposition  ED Disposition       ED Disposition   Discharge    Condition   Stable    Comment   --               Laina Caro, APRN  [address] [facility] [address]  [phone]    In 1 week  As needed         Medication List        Changed      cloNIDine 0.2 MG tablet  Commonly known as: Catapres  Take 1 tablet by mouth Every Night.  What changed:   how much to take  when to take this     DULoxetine 60 MG capsule  Commonly known as: Cymbalta  Take 1 capsule by mouth Daily for 90 days.  What changed: when to take this                 Blayne Light PA-C  06/24/25 [id number]

## 2025-06-24 NOTE — DISCHARGE INSTRUCTIONS
Please return to the emergency department if you have any new or worsening symptoms.  Please follow-up with your primary care provider next week if you continue to have symptoms.

## 2025-06-25 ENCOUNTER — APPOINTMENT (OUTPATIENT)
Dept: LAB | Facility: HOSPITAL | Age: 50
End: 2025-06-25
Payer: COMMERCIAL

## 2025-06-25 ENCOUNTER — INFUSION (OUTPATIENT)
Dept: ONCOLOGY | Facility: HOSPITAL | Age: 50
End: 2025-06-25
Payer: COMMERCIAL

## 2025-06-25 VITALS
RESPIRATION RATE: 14 BRPM | OXYGEN SATURATION: 100 % | BODY MASS INDEX: 29.49 KG/M2 | HEART RATE: 75 BPM | SYSTOLIC BLOOD PRESSURE: 129 MMHG | DIASTOLIC BLOOD PRESSURE: 76 MMHG | TEMPERATURE: 97.3 F | HEIGHT: 65 IN | WEIGHT: 177 LBS

## 2025-06-25 DIAGNOSIS — E89.0 POST-OPERATIVE HYPOTHYROIDISM: Primary | ICD-10-CM

## 2025-06-25 LAB
QT INTERVAL: 340 MS
QTC INTERVAL: 425 MS

## 2025-06-25 PROCEDURE — 96372 THER/PROPH/DIAG INJ SC/IM: CPT

## 2025-06-25 PROCEDURE — 25010000002: Performed by: NURSE PRACTITIONER

## 2025-06-25 RX ADMIN — LEVOTHYROXINE SODIUM ANHYDROUS 500 MCG: 500 INJECTION, POWDER, LYOPHILIZED, FOR SOLUTION INTRAVENOUS at 12:08

## 2025-07-02 ENCOUNTER — LAB (OUTPATIENT)
Dept: LAB | Facility: HOSPITAL | Age: 50
End: 2025-07-02
Payer: COMMERCIAL

## 2025-07-02 ENCOUNTER — INFUSION (OUTPATIENT)
Dept: ONCOLOGY | Facility: HOSPITAL | Age: 50
End: 2025-07-02
Payer: COMMERCIAL

## 2025-07-02 VITALS
BODY MASS INDEX: 29.32 KG/M2 | TEMPERATURE: 97.6 F | WEIGHT: 176 LBS | DIASTOLIC BLOOD PRESSURE: 79 MMHG | RESPIRATION RATE: 17 BRPM | SYSTOLIC BLOOD PRESSURE: 140 MMHG | OXYGEN SATURATION: 100 % | HEIGHT: 65 IN | HEART RATE: 72 BPM

## 2025-07-02 DIAGNOSIS — E89.0 POST-OPERATIVE HYPOTHYROIDISM: ICD-10-CM

## 2025-07-02 DIAGNOSIS — E89.0 POST-OPERATIVE HYPOTHYROIDISM: Primary | ICD-10-CM

## 2025-07-02 LAB
T4 FREE SERPL-MCNC: 0.89 NG/DL (ref 0.92–1.68)
TSH SERPL DL<=0.05 MIU/L-ACNC: 15.86 UIU/ML (ref 0.27–4.2)

## 2025-07-02 PROCEDURE — 25010000002: Performed by: STUDENT IN AN ORGANIZED HEALTH CARE EDUCATION/TRAINING PROGRAM

## 2025-07-02 PROCEDURE — 36415 COLL VENOUS BLD VENIPUNCTURE: CPT

## 2025-07-02 PROCEDURE — 84443 ASSAY THYROID STIM HORMONE: CPT

## 2025-07-02 PROCEDURE — 84439 ASSAY OF FREE THYROXINE: CPT

## 2025-07-02 PROCEDURE — 96372 THER/PROPH/DIAG INJ SC/IM: CPT

## 2025-07-02 RX ADMIN — LEVOTHYROXINE SODIUM ANHYDROUS 500 MCG: 500 INJECTION, POWDER, LYOPHILIZED, FOR SOLUTION INTRAVENOUS at 12:35

## 2025-07-09 ENCOUNTER — LAB (OUTPATIENT)
Dept: LAB | Facility: HOSPITAL | Age: 50
End: 2025-07-09
Payer: COMMERCIAL

## 2025-07-09 ENCOUNTER — INFUSION (OUTPATIENT)
Dept: ONCOLOGY | Facility: HOSPITAL | Age: 50
End: 2025-07-09
Payer: COMMERCIAL

## 2025-07-09 VITALS
WEIGHT: 177 LBS | SYSTOLIC BLOOD PRESSURE: 150 MMHG | BODY MASS INDEX: 29.49 KG/M2 | HEIGHT: 65 IN | TEMPERATURE: 97.4 F | RESPIRATION RATE: 18 BRPM | DIASTOLIC BLOOD PRESSURE: 82 MMHG | HEART RATE: 72 BPM | OXYGEN SATURATION: 98 %

## 2025-07-09 DIAGNOSIS — E03.9 HYPOTHYROIDISM, UNSPECIFIED TYPE: ICD-10-CM

## 2025-07-09 DIAGNOSIS — E89.0 POST-OPERATIVE HYPOTHYROIDISM: ICD-10-CM

## 2025-07-09 DIAGNOSIS — E89.0 POST-OPERATIVE HYPOTHYROIDISM: Primary | ICD-10-CM

## 2025-07-09 LAB
T4 FREE SERPL-MCNC: 0.95 NG/DL (ref 0.92–1.68)
TSH SERPL DL<=0.05 MIU/L-ACNC: 14.42 UIU/ML (ref 0.27–4.2)

## 2025-07-09 PROCEDURE — 84439 ASSAY OF FREE THYROXINE: CPT

## 2025-07-09 PROCEDURE — 36415 COLL VENOUS BLD VENIPUNCTURE: CPT

## 2025-07-09 PROCEDURE — 96372 THER/PROPH/DIAG INJ SC/IM: CPT

## 2025-07-09 PROCEDURE — 84443 ASSAY THYROID STIM HORMONE: CPT

## 2025-07-09 PROCEDURE — 25010000002: Performed by: STUDENT IN AN ORGANIZED HEALTH CARE EDUCATION/TRAINING PROGRAM

## 2025-07-09 RX ADMIN — LEVOTHYROXINE SODIUM ANHYDROUS 250 MCG: 500 INJECTION, POWDER, LYOPHILIZED, FOR SOLUTION INTRAVENOUS at 11:59

## 2025-07-09 NOTE — PROGRESS NOTES
Sam, pharmacist saw that patient had a televisit on 6/25 in which Dr. Branch wanted patients dose decreased to 250 mcg weekly. Called [facility] and spoke with Gisela who verified with Dr. Branch that the new dose was to be 250 mcg weekly starting today. Verbal order received.

## 2025-07-16 ENCOUNTER — INFUSION (OUTPATIENT)
Dept: ONCOLOGY | Facility: HOSPITAL | Age: 50
End: 2025-07-16
Payer: COMMERCIAL

## 2025-07-16 ENCOUNTER — LAB (OUTPATIENT)
Dept: LAB | Facility: HOSPITAL | Age: 50
End: 2025-07-16
Payer: COMMERCIAL

## 2025-07-16 VITALS
WEIGHT: 176.6 LBS | TEMPERATURE: 95.5 F | RESPIRATION RATE: 18 BRPM | BODY MASS INDEX: 29.42 KG/M2 | HEIGHT: 65 IN | SYSTOLIC BLOOD PRESSURE: 125 MMHG | DIASTOLIC BLOOD PRESSURE: 86 MMHG | HEART RATE: 71 BPM | OXYGEN SATURATION: 100 %

## 2025-07-16 DIAGNOSIS — E89.0 POST-OPERATIVE HYPOTHYROIDISM: ICD-10-CM

## 2025-07-16 DIAGNOSIS — E03.9 HYPOTHYROIDISM, UNSPECIFIED TYPE: Primary | ICD-10-CM

## 2025-07-16 DIAGNOSIS — E89.0 POST-OPERATIVE HYPOTHYROIDISM: Primary | ICD-10-CM

## 2025-07-16 LAB
T4 FREE SERPL-MCNC: 0.7 NG/DL (ref 0.92–1.68)
TSH SERPL DL<=0.05 MIU/L-ACNC: 29.66 UIU/ML (ref 0.27–4.2)

## 2025-07-16 PROCEDURE — 96372 THER/PROPH/DIAG INJ SC/IM: CPT

## 2025-07-16 PROCEDURE — 84439 ASSAY OF FREE THYROXINE: CPT

## 2025-07-16 PROCEDURE — 84443 ASSAY THYROID STIM HORMONE: CPT

## 2025-07-16 PROCEDURE — 36415 COLL VENOUS BLD VENIPUNCTURE: CPT

## 2025-07-16 PROCEDURE — 25010000002: Performed by: STUDENT IN AN ORGANIZED HEALTH CARE EDUCATION/TRAINING PROGRAM

## 2025-07-16 RX ADMIN — LEVOTHYROXINE SODIUM ANHYDROUS 250 MCG: 500 INJECTION, POWDER, LYOPHILIZED, FOR SOLUTION INTRAVENOUS at 12:47

## 2025-07-23 ENCOUNTER — INFUSION (OUTPATIENT)
Dept: ONCOLOGY | Facility: HOSPITAL | Age: 50
End: 2025-07-23
Payer: COMMERCIAL

## 2025-07-23 ENCOUNTER — LAB (OUTPATIENT)
Dept: LAB | Facility: HOSPITAL | Age: 50
End: 2025-07-23
Payer: COMMERCIAL

## 2025-07-23 VITALS
WEIGHT: 178 LBS | HEIGHT: 65 IN | RESPIRATION RATE: 18 BRPM | OXYGEN SATURATION: 98 % | BODY MASS INDEX: 29.66 KG/M2 | HEART RATE: 70 BPM | SYSTOLIC BLOOD PRESSURE: 114 MMHG | TEMPERATURE: 97.8 F | DIASTOLIC BLOOD PRESSURE: 74 MMHG

## 2025-07-23 DIAGNOSIS — E03.9 HYPOTHYROIDISM, UNSPECIFIED TYPE: ICD-10-CM

## 2025-07-23 DIAGNOSIS — E89.0 POST-OPERATIVE HYPOTHYROIDISM: Primary | ICD-10-CM

## 2025-07-23 DIAGNOSIS — E89.0 POST-OPERATIVE HYPOTHYROIDISM: ICD-10-CM

## 2025-07-23 LAB
T4 FREE SERPL-MCNC: 0.61 NG/DL (ref 0.92–1.68)
TSH SERPL DL<=0.05 MIU/L-ACNC: 39.15 UIU/ML (ref 0.27–4.2)

## 2025-07-23 PROCEDURE — 25010000002: Performed by: STUDENT IN AN ORGANIZED HEALTH CARE EDUCATION/TRAINING PROGRAM

## 2025-07-23 PROCEDURE — 84439 ASSAY OF FREE THYROXINE: CPT

## 2025-07-23 PROCEDURE — 36415 COLL VENOUS BLD VENIPUNCTURE: CPT

## 2025-07-23 PROCEDURE — 84443 ASSAY THYROID STIM HORMONE: CPT

## 2025-07-23 PROCEDURE — 96372 THER/PROPH/DIAG INJ SC/IM: CPT

## 2025-07-23 RX ADMIN — LEVOTHYROXINE SODIUM ANHYDROUS 250 MCG: 500 INJECTION, POWDER, LYOPHILIZED, FOR SOLUTION INTRAVENOUS at 12:33

## 2025-07-30 ENCOUNTER — LAB (OUTPATIENT)
Dept: LAB | Facility: HOSPITAL | Age: 50
End: 2025-07-30
Payer: COMMERCIAL

## 2025-07-30 ENCOUNTER — INFUSION (OUTPATIENT)
Dept: ONCOLOGY | Facility: HOSPITAL | Age: 50
End: 2025-07-30
Payer: COMMERCIAL

## 2025-07-30 VITALS
BODY MASS INDEX: 29.32 KG/M2 | TEMPERATURE: 96.8 F | HEART RATE: 74 BPM | DIASTOLIC BLOOD PRESSURE: 76 MMHG | OXYGEN SATURATION: 99 % | RESPIRATION RATE: 16 BRPM | SYSTOLIC BLOOD PRESSURE: 117 MMHG | HEIGHT: 65 IN | WEIGHT: 176 LBS

## 2025-07-30 DIAGNOSIS — E89.0 POST-OPERATIVE HYPOTHYROIDISM: Primary | ICD-10-CM

## 2025-07-30 DIAGNOSIS — E03.9 HYPOTHYROIDISM, UNSPECIFIED TYPE: ICD-10-CM

## 2025-07-30 LAB
T4 FREE SERPL-MCNC: 0.61 NG/DL (ref 0.92–1.68)
TSH SERPL DL<=0.05 MIU/L-ACNC: 43.63 UIU/ML (ref 0.27–4.2)

## 2025-07-30 PROCEDURE — 25010000002: Performed by: STUDENT IN AN ORGANIZED HEALTH CARE EDUCATION/TRAINING PROGRAM

## 2025-07-30 PROCEDURE — 96372 THER/PROPH/DIAG INJ SC/IM: CPT

## 2025-07-30 PROCEDURE — 84443 ASSAY THYROID STIM HORMONE: CPT

## 2025-07-30 PROCEDURE — 36415 COLL VENOUS BLD VENIPUNCTURE: CPT

## 2025-07-30 PROCEDURE — 84439 ASSAY OF FREE THYROXINE: CPT

## 2025-07-30 RX ADMIN — LEVOTHYROXINE SODIUM ANHYDROUS 250 MCG: 500 INJECTION, POWDER, LYOPHILIZED, FOR SOLUTION INTRAVENOUS at 12:47

## 2025-08-06 ENCOUNTER — INFUSION (OUTPATIENT)
Dept: ONCOLOGY | Facility: HOSPITAL | Age: 50
End: 2025-08-06
Payer: COMMERCIAL

## 2025-08-06 ENCOUNTER — LAB (OUTPATIENT)
Dept: LAB | Facility: HOSPITAL | Age: 50
End: 2025-08-06
Payer: COMMERCIAL

## 2025-08-06 VITALS
HEIGHT: 65 IN | RESPIRATION RATE: 16 BRPM | BODY MASS INDEX: 30.32 KG/M2 | DIASTOLIC BLOOD PRESSURE: 75 MMHG | HEART RATE: 57 BPM | OXYGEN SATURATION: 99 % | SYSTOLIC BLOOD PRESSURE: 123 MMHG | WEIGHT: 182 LBS | TEMPERATURE: 96.8 F

## 2025-08-06 DIAGNOSIS — E89.0 POST-OPERATIVE HYPOTHYROIDISM: ICD-10-CM

## 2025-08-06 DIAGNOSIS — E89.0 POST-OPERATIVE HYPOTHYROIDISM: Primary | ICD-10-CM

## 2025-08-06 DIAGNOSIS — E03.9 HYPOTHYROIDISM, UNSPECIFIED TYPE: ICD-10-CM

## 2025-08-06 DIAGNOSIS — Z86.59 HISTORY OF ATTENTION DEFICIT HYPERACTIVITY DISORDER (ADHD): ICD-10-CM

## 2025-08-06 DIAGNOSIS — G47.9 SLEEP DIFFICULTIES: ICD-10-CM

## 2025-08-06 LAB
T4 FREE SERPL-MCNC: 1.56 NG/DL (ref 0.92–1.68)
TSH SERPL DL<=0.05 MIU/L-ACNC: 25.08 UIU/ML (ref 0.27–4.2)

## 2025-08-06 PROCEDURE — 36415 COLL VENOUS BLD VENIPUNCTURE: CPT

## 2025-08-06 PROCEDURE — 25010000002: Performed by: STUDENT IN AN ORGANIZED HEALTH CARE EDUCATION/TRAINING PROGRAM

## 2025-08-06 PROCEDURE — 84439 ASSAY OF FREE THYROXINE: CPT

## 2025-08-06 PROCEDURE — 96372 THER/PROPH/DIAG INJ SC/IM: CPT

## 2025-08-06 PROCEDURE — 84443 ASSAY THYROID STIM HORMONE: CPT

## 2025-08-06 RX ORDER — LEVOTHYROXINE SODIUM ANHYDROUS 500 UG/5ML
500 INJECTION, POWDER, LYOPHILIZED, FOR SOLUTION INTRAVENOUS ONCE
Status: CANCELLED
Start: 2025-08-13 | End: 2025-08-13

## 2025-08-06 RX ORDER — LEVOTHYROXINE SODIUM 100 UG/ML
5 INJECTION, SOLUTION INTRAVENOUS WEEKLY
Start: 2025-08-06

## 2025-08-06 RX ORDER — LEVOTHYROXINE SODIUM 100 UG/ML
5 INJECTION, SOLUTION INTRAVENOUS ONCE
Status: DISCONTINUED | OUTPATIENT
Start: 2025-08-06 | End: 2025-08-06

## 2025-08-06 RX ORDER — LEVOTHYROXINE SODIUM ANHYDROUS 500 UG/5ML
500 INJECTION, POWDER, LYOPHILIZED, FOR SOLUTION INTRAVENOUS ONCE
Status: CANCELLED
Start: 2025-08-06 | End: 2025-08-06

## 2025-08-06 RX ORDER — LEVOTHYROXINE SODIUM ANHYDROUS 500 UG/5ML
500 INJECTION, POWDER, LYOPHILIZED, FOR SOLUTION INTRAVENOUS ONCE
Status: DISCONTINUED | OUTPATIENT
Start: 2025-08-06 | End: 2025-08-06

## 2025-08-06 RX ADMIN — LEVOTHYROXINE SODIUM ANHYDROUS: 500 INJECTION, POWDER, LYOPHILIZED, FOR SOLUTION INTRAVENOUS at 13:11

## 2025-08-13 ENCOUNTER — LAB (OUTPATIENT)
Dept: LAB | Facility: HOSPITAL | Age: 50
End: 2025-08-13
Payer: COMMERCIAL

## 2025-08-13 ENCOUNTER — INFUSION (OUTPATIENT)
Dept: ONCOLOGY | Facility: HOSPITAL | Age: 50
End: 2025-08-13
Payer: COMMERCIAL

## 2025-08-13 VITALS
TEMPERATURE: 97.5 F | WEIGHT: 178 LBS | DIASTOLIC BLOOD PRESSURE: 54 MMHG | OXYGEN SATURATION: 98 % | HEIGHT: 65 IN | HEART RATE: 65 BPM | BODY MASS INDEX: 29.66 KG/M2 | SYSTOLIC BLOOD PRESSURE: 93 MMHG | RESPIRATION RATE: 16 BRPM

## 2025-08-13 DIAGNOSIS — E89.0 POST-OPERATIVE HYPOTHYROIDISM: Primary | ICD-10-CM

## 2025-08-13 DIAGNOSIS — E89.0 POST-OPERATIVE HYPOTHYROIDISM: ICD-10-CM

## 2025-08-13 LAB
T4 FREE SERPL-MCNC: 1.98 NG/DL (ref 0.92–1.68)
TSH SERPL DL<=0.05 MIU/L-ACNC: 0.67 UIU/ML (ref 0.27–4.2)

## 2025-08-13 PROCEDURE — 36415 COLL VENOUS BLD VENIPUNCTURE: CPT

## 2025-08-13 PROCEDURE — 84439 ASSAY OF FREE THYROXINE: CPT

## 2025-08-13 PROCEDURE — 84443 ASSAY THYROID STIM HORMONE: CPT

## 2025-08-13 PROCEDURE — 25010000002: Performed by: STUDENT IN AN ORGANIZED HEALTH CARE EDUCATION/TRAINING PROGRAM

## 2025-08-13 PROCEDURE — 96372 THER/PROPH/DIAG INJ SC/IM: CPT

## 2025-08-13 RX ADMIN — LEVOTHYROXINE SODIUM 250 MCG: 500 INJECTION, POWDER, LYOPHILIZED, FOR SOLUTION INTRAVENOUS at 13:00

## 2025-08-18 ENCOUNTER — APPOINTMENT (OUTPATIENT)
Dept: GENERAL RADIOLOGY | Facility: HOSPITAL | Age: 50
End: 2025-08-18
Payer: COMMERCIAL

## 2025-08-18 PROCEDURE — 71046 X-RAY EXAM CHEST 2 VIEWS: CPT

## 2025-08-20 ENCOUNTER — TRANSCRIBE ORDERS (OUTPATIENT)
Dept: ADMINISTRATIVE | Facility: HOSPITAL | Age: 50
End: 2025-08-20
Payer: COMMERCIAL

## 2025-08-20 ENCOUNTER — LAB (OUTPATIENT)
Dept: LAB | Facility: HOSPITAL | Age: 50
End: 2025-08-20
Payer: COMMERCIAL

## 2025-08-20 ENCOUNTER — INFUSION (OUTPATIENT)
Dept: ONCOLOGY | Facility: HOSPITAL | Age: 50
End: 2025-08-20
Payer: COMMERCIAL

## 2025-08-20 VITALS
WEIGHT: 179 LBS | OXYGEN SATURATION: 99 % | SYSTOLIC BLOOD PRESSURE: 146 MMHG | BODY MASS INDEX: 28.77 KG/M2 | HEART RATE: 83 BPM | TEMPERATURE: 97.2 F | RESPIRATION RATE: 18 BRPM | HEIGHT: 66 IN | DIASTOLIC BLOOD PRESSURE: 85 MMHG

## 2025-08-20 DIAGNOSIS — E55.9 VITAMIN D2 DEFICIENCY: ICD-10-CM

## 2025-08-20 DIAGNOSIS — E53.8 DEFICIENCY OF OTHER SPECIFIED B GROUP VITAMINS: Primary | ICD-10-CM

## 2025-08-20 DIAGNOSIS — E53.8 DEFICIENCY OF OTHER SPECIFIED B GROUP VITAMINS: ICD-10-CM

## 2025-08-20 DIAGNOSIS — E89.0 POST-OPERATIVE HYPOTHYROIDISM: Primary | ICD-10-CM

## 2025-08-20 DIAGNOSIS — R73.03 PRE-DIABETES: ICD-10-CM

## 2025-08-20 DIAGNOSIS — E89.0 POST-OPERATIVE HYPOTHYROIDISM: ICD-10-CM

## 2025-08-20 DIAGNOSIS — E61.1 IRON DEFICIENCY: ICD-10-CM

## 2025-08-20 DIAGNOSIS — E03.9 HYPOTHYROIDISM, UNSPECIFIED TYPE: ICD-10-CM

## 2025-08-20 LAB
25(OH)D3 SERPL-MCNC: 29.8 NG/ML (ref 30–100)
ALBUMIN UR-MCNC: <1.2 MG/DL
CREAT UR-MCNC: 134.6 MG/DL
FERRITIN SERPL-MCNC: 30.31 NG/ML (ref 13–150)
HBA1C MFR BLD: 5.5 % (ref 4.8–5.6)
IRON 24H UR-MRATE: 97 MCG/DL (ref 37–145)
IRON SATN MFR SERPL: 23 % (ref 20–50)
MICROALBUMIN/CREAT UR: NORMAL MG/G{CREAT}
T4 FREE SERPL-MCNC: 3.65 NG/DL (ref 0.92–1.68)
TIBC SERPL-MCNC: 416 MCG/DL (ref 298–536)
TRANSFERRIN SERPL-MCNC: 279 MG/DL (ref 200–360)
TSH SERPL DL<=0.05 MIU/L-ACNC: 0.11 UIU/ML (ref 0.27–4.2)
VIT B12 BLD-MCNC: >2000 PG/ML (ref 211–946)

## 2025-08-20 PROCEDURE — 82728 ASSAY OF FERRITIN: CPT

## 2025-08-20 PROCEDURE — 83540 ASSAY OF IRON: CPT

## 2025-08-20 PROCEDURE — 82306 VITAMIN D 25 HYDROXY: CPT

## 2025-08-20 PROCEDURE — 82570 ASSAY OF URINE CREATININE: CPT

## 2025-08-20 PROCEDURE — 83036 HEMOGLOBIN GLYCOSYLATED A1C: CPT

## 2025-08-20 PROCEDURE — 36415 COLL VENOUS BLD VENIPUNCTURE: CPT

## 2025-08-20 PROCEDURE — G0463 HOSPITAL OUTPT CLINIC VISIT: HCPCS

## 2025-08-20 PROCEDURE — 84443 ASSAY THYROID STIM HORMONE: CPT

## 2025-08-20 PROCEDURE — 84439 ASSAY OF FREE THYROXINE: CPT

## 2025-08-20 PROCEDURE — 82043 UR ALBUMIN QUANTITATIVE: CPT

## 2025-08-20 PROCEDURE — 84466 ASSAY OF TRANSFERRIN: CPT

## 2025-08-20 PROCEDURE — 82607 VITAMIN B-12: CPT

## 2025-08-27 ENCOUNTER — LAB (OUTPATIENT)
Dept: LAB | Facility: HOSPITAL | Age: 50
End: 2025-08-27
Payer: COMMERCIAL

## 2025-08-27 ENCOUNTER — INFUSION (OUTPATIENT)
Dept: ONCOLOGY | Facility: HOSPITAL | Age: 50
End: 2025-08-27
Payer: COMMERCIAL

## 2025-08-27 VITALS
BODY MASS INDEX: 28.45 KG/M2 | RESPIRATION RATE: 16 BRPM | OXYGEN SATURATION: 96 % | HEART RATE: 72 BPM | HEIGHT: 66 IN | TEMPERATURE: 97.5 F | DIASTOLIC BLOOD PRESSURE: 74 MMHG | SYSTOLIC BLOOD PRESSURE: 117 MMHG | WEIGHT: 177 LBS

## 2025-08-27 DIAGNOSIS — E03.9 HYPOTHYROIDISM, UNSPECIFIED TYPE: ICD-10-CM

## 2025-08-27 DIAGNOSIS — E89.0 POST-OPERATIVE HYPOTHYROIDISM: ICD-10-CM

## 2025-08-27 DIAGNOSIS — E06.3 HASHIMOTO THYROIDITIS: Primary | ICD-10-CM

## 2025-08-27 LAB
T4 FREE SERPL-MCNC: 2.22 NG/DL (ref 0.92–1.68)
TSH SERPL DL<=0.05 MIU/L-ACNC: 0.1 UIU/ML (ref 0.27–4.2)

## 2025-08-27 PROCEDURE — 84443 ASSAY THYROID STIM HORMONE: CPT

## 2025-08-27 PROCEDURE — G0463 HOSPITAL OUTPT CLINIC VISIT: HCPCS

## 2025-08-27 PROCEDURE — 36415 COLL VENOUS BLD VENIPUNCTURE: CPT

## 2025-08-27 PROCEDURE — 84439 ASSAY OF FREE THYROXINE: CPT

## (undated) DEVICE — NERVE BLOCK TRAY (FACET)-LF: Brand: MEDLINE INDUSTRIES, INC.

## (undated) DEVICE — NEEDLE SPNL 20 GAX6 IN

## (undated) DEVICE — SYRINGE MED 10ML TRNSLUC BRL PLUNG BLK MRK POLYPR CTRL

## (undated) DEVICE — ENCORE® LATEX MICRO SIZE 6, STERILE LATEX POWDER-FREE SURGICAL GLOVE: Brand: ENCORE

## (undated) DEVICE — GLOVE ORANGE PI 7 1/2   MSG9075

## (undated) DEVICE — ENCORE® LATEX MICRO SIZE 7.5, STERILE LATEX POWDER-FREE SURGICAL GLOVE: Brand: ENCORE